# Patient Record
Sex: FEMALE | Race: WHITE | NOT HISPANIC OR LATINO | Employment: OTHER | RURAL
[De-identification: names, ages, dates, MRNs, and addresses within clinical notes are randomized per-mention and may not be internally consistent; named-entity substitution may affect disease eponyms.]

---

## 2020-11-02 ENCOUNTER — HISTORICAL (OUTPATIENT)
Dept: ADMINISTRATIVE | Facility: HOSPITAL | Age: 75
End: 2020-11-02

## 2020-11-02 LAB — CALCIUM SERPL-MCNC: 9.9 MG/DL (ref 8.5–10.1)

## 2021-05-27 ENCOUNTER — TELEPHONE (OUTPATIENT)
Dept: FAMILY MEDICINE | Facility: CLINIC | Age: 76
End: 2021-05-27

## 2021-05-27 DIAGNOSIS — M81.0 OSTEOPOROSIS, UNSPECIFIED OSTEOPOROSIS TYPE, UNSPECIFIED PATHOLOGICAL FRACTURE PRESENCE: Primary | ICD-10-CM

## 2021-06-17 ENCOUNTER — OFFICE VISIT (OUTPATIENT)
Dept: FAMILY MEDICINE | Facility: CLINIC | Age: 76
End: 2021-06-17
Payer: COMMERCIAL

## 2021-06-17 VITALS
BODY MASS INDEX: 31.29 KG/M2 | TEMPERATURE: 97 F | RESPIRATION RATE: 20 BRPM | HEIGHT: 67 IN | SYSTOLIC BLOOD PRESSURE: 142 MMHG | HEART RATE: 54 BPM | WEIGHT: 199.38 LBS | DIASTOLIC BLOOD PRESSURE: 92 MMHG | OXYGEN SATURATION: 94 %

## 2021-06-17 DIAGNOSIS — M70.61 TROCHANTERIC BURSITIS OF RIGHT HIP: Primary | ICD-10-CM

## 2021-06-17 DIAGNOSIS — F32.A DEPRESSIVE DISORDER: ICD-10-CM

## 2021-06-17 PROCEDURE — 20610 DRAIN/INJ JOINT/BURSA W/O US: CPT | Mod: RT,,, | Performed by: FAMILY MEDICINE

## 2021-06-17 PROCEDURE — 99214 PR OFFICE/OUTPT VISIT, EST, LEVL IV, 30-39 MIN: ICD-10-PCS | Mod: 25,,, | Performed by: FAMILY MEDICINE

## 2021-06-17 PROCEDURE — 20610 LARGE JOINT ASPIRATION/INJECTION: R GREATER TROCHANTERIC BURSA: ICD-10-PCS | Mod: RT,,, | Performed by: FAMILY MEDICINE

## 2021-06-17 PROCEDURE — 99214 OFFICE O/P EST MOD 30 MIN: CPT | Mod: 25,,, | Performed by: FAMILY MEDICINE

## 2021-06-17 RX ORDER — CALCIUM CARBONATE/VITAMIN D3 600MG-5MCG
1 TABLET ORAL 2 TIMES DAILY
COMMUNITY

## 2021-06-17 RX ORDER — ZINC GLUCONATE 50 MG
50 TABLET ORAL DAILY
COMMUNITY

## 2021-06-17 RX ORDER — AMLODIPINE BESYLATE 5 MG/1
5 TABLET ORAL 2 TIMES DAILY
COMMUNITY
Start: 2021-04-14 | End: 2021-10-07 | Stop reason: SDUPTHER

## 2021-06-17 RX ORDER — LIDOCAINE HYDROCHLORIDE 20 MG/ML
2 INJECTION, SOLUTION INFILTRATION; PERINEURAL
Status: DISCONTINUED | OUTPATIENT
Start: 2021-06-17 | End: 2021-06-17 | Stop reason: HOSPADM

## 2021-06-17 RX ORDER — CELECOXIB 200 MG/1
200 CAPSULE ORAL DAILY
COMMUNITY
Start: 2021-04-14 | End: 2021-10-27 | Stop reason: SDUPTHER

## 2021-06-17 RX ORDER — DENOSUMAB 60 MG/ML
60 INJECTION SUBCUTANEOUS
Status: ON HOLD | COMMUNITY
End: 2023-09-27 | Stop reason: HOSPADM

## 2021-06-17 RX ORDER — RIVAROXABAN 20 MG/1
1 TABLET, FILM COATED ORAL DAILY
COMMUNITY
Start: 2021-04-14 | End: 2021-10-14 | Stop reason: SDUPTHER

## 2021-06-17 RX ORDER — LIDOCAINE 50 MG/G
1 PATCH TOPICAL DAILY
Qty: 30 PATCH | Refills: 5 | Status: SHIPPED | OUTPATIENT
Start: 2021-06-17 | End: 2021-10-24

## 2021-06-17 RX ORDER — ASCORBIC ACID 500 MG
500 TABLET ORAL DAILY
COMMUNITY

## 2021-06-17 RX ORDER — ATORVASTATIN CALCIUM 40 MG/1
40 TABLET, FILM COATED ORAL DAILY
COMMUNITY
Start: 2021-04-14 | End: 2021-07-09 | Stop reason: SDUPTHER

## 2021-06-17 RX ORDER — GABAPENTIN 300 MG/1
CAPSULE ORAL
COMMUNITY
Start: 2021-04-14 | End: 2023-03-13 | Stop reason: SDUPTHER

## 2021-06-17 RX ORDER — DESVENLAFAXINE 100 MG/1
100 TABLET, EXTENDED RELEASE ORAL DAILY
COMMUNITY
Start: 2021-04-14 | End: 2021-06-17 | Stop reason: ALTCHOICE

## 2021-06-17 RX ORDER — ALBUTEROL SULFATE 90 UG/1
AEROSOL, METERED RESPIRATORY (INHALATION)
COMMUNITY
Start: 2021-05-26 | End: 2021-07-09 | Stop reason: SDUPTHER

## 2021-06-17 RX ORDER — PRAMIPEXOLE DIHYDROCHLORIDE 0.12 MG/1
0.12 TABLET ORAL NIGHTLY
COMMUNITY
Start: 2021-04-14 | End: 2021-10-14 | Stop reason: SDUPTHER

## 2021-06-17 RX ORDER — PRIMIDONE 50 MG/1
TABLET ORAL
COMMUNITY
Start: 2021-04-14 | End: 2021-10-14 | Stop reason: SDUPTHER

## 2021-06-17 RX ORDER — NEOMYCIN SULFATE, POLYMYXIN B SULFATE, HYDROCORTISONE 3.5; 10000; 1 MG/ML; [USP'U]/ML; MG/ML
2 SOLUTION/ DROPS AURICULAR (OTIC) EVERY 6 HOURS
COMMUNITY
Start: 2021-01-14 | End: 2022-12-12 | Stop reason: ALTCHOICE

## 2021-06-17 RX ORDER — FUROSEMIDE 20 MG/1
20 TABLET ORAL DAILY
COMMUNITY
Start: 2021-04-14 | End: 2021-09-21 | Stop reason: SDUPTHER

## 2021-06-17 RX ORDER — IPRATROPIUM BROMIDE 21 UG/1
SPRAY, METERED NASAL
COMMUNITY
Start: 2021-04-14 | End: 2021-07-16 | Stop reason: DRUGHIGH

## 2021-06-17 RX ORDER — DICLOFENAC SODIUM 10 MG/G
GEL TOPICAL
COMMUNITY
Start: 2021-04-14 | End: 2021-07-09 | Stop reason: SDUPTHER

## 2021-06-17 RX ORDER — OMEPRAZOLE 20 MG/1
20 CAPSULE, DELAYED RELEASE ORAL DAILY
COMMUNITY
Start: 2021-04-14 | End: 2021-10-14 | Stop reason: SDUPTHER

## 2021-06-17 RX ORDER — SERTRALINE HYDROCHLORIDE 50 MG/1
50 TABLET, FILM COATED ORAL DAILY
Qty: 30 TABLET | Refills: 11 | Status: SHIPPED | OUTPATIENT
Start: 2021-06-17 | End: 2021-10-24

## 2021-06-17 RX ORDER — DEXAMETHASONE SODIUM PHOSPHATE 4 MG/ML
4 INJECTION, SOLUTION INTRA-ARTICULAR; INTRALESIONAL; INTRAMUSCULAR; INTRAVENOUS; SOFT TISSUE
Status: DISCONTINUED | OUTPATIENT
Start: 2021-06-17 | End: 2021-06-17 | Stop reason: HOSPADM

## 2021-06-17 RX ORDER — TIZANIDINE 4 MG/1
4 TABLET ORAL 2 TIMES DAILY PRN
COMMUNITY
Start: 2021-04-14 | End: 2021-10-07 | Stop reason: SDUPTHER

## 2021-06-17 RX ORDER — LOSARTAN POTASSIUM 100 MG/1
100 TABLET ORAL DAILY
COMMUNITY
Start: 2021-04-14 | End: 2021-10-14 | Stop reason: SDUPTHER

## 2021-06-17 RX ORDER — HYDROCODONE BITARTRATE AND ACETAMINOPHEN 10; 325 MG/1; MG/1
TABLET ORAL
COMMUNITY
Start: 2021-05-27

## 2021-06-17 RX ORDER — PROPRANOLOL HYDROCHLORIDE 40 MG/1
40 TABLET ORAL 2 TIMES DAILY
COMMUNITY
Start: 2021-04-14 | End: 2021-10-14 | Stop reason: SDUPTHER

## 2021-06-17 RX ADMIN — LIDOCAINE HYDROCHLORIDE 2 ML: 20 INJECTION, SOLUTION INFILTRATION; PERINEURAL at 09:06

## 2021-06-17 RX ADMIN — DEXAMETHASONE SODIUM PHOSPHATE 4 MG: 4 INJECTION, SOLUTION INTRA-ARTICULAR; INTRALESIONAL; INTRAMUSCULAR; INTRAVENOUS; SOFT TISSUE at 09:06

## 2021-07-01 ENCOUNTER — HOSPITAL ENCOUNTER (OUTPATIENT)
Dept: RADIOLOGY | Facility: HOSPITAL | Age: 76
Discharge: HOME | End: 2021-07-01
Attending: FAMILY MEDICINE
Payer: COMMERCIAL

## 2021-07-01 DIAGNOSIS — M81.0 OSTEOPOROSIS, POST-MENOPAUSAL: ICD-10-CM

## 2021-07-01 PROCEDURE — 77080 DXA BONE DENSITY AXIAL: CPT | Mod: TC

## 2021-07-09 RX ORDER — ATORVASTATIN CALCIUM 40 MG/1
40 TABLET, FILM COATED ORAL DAILY
Qty: 90 TABLET | Refills: 1 | Status: SHIPPED | OUTPATIENT
Start: 2021-07-09 | End: 2022-01-12 | Stop reason: SDUPTHER

## 2021-07-09 RX ORDER — DICLOFENAC SODIUM 10 MG/G
GEL TOPICAL
Qty: 500 G | Refills: 5 | Status: SHIPPED | OUTPATIENT
Start: 2021-07-09 | End: 2022-01-12 | Stop reason: SDUPTHER

## 2021-07-09 RX ORDER — DESVENLAFAXINE 100 MG/1
100 TABLET, EXTENDED RELEASE ORAL DAILY
COMMUNITY
Start: 2021-07-08 | End: 2021-10-14 | Stop reason: SDUPTHER

## 2021-07-09 RX ORDER — ALBUTEROL SULFATE 90 UG/1
AEROSOL, METERED RESPIRATORY (INHALATION)
Qty: 18 G | Refills: 1 | Status: SHIPPED | OUTPATIENT
Start: 2021-07-09 | End: 2021-10-07 | Stop reason: SDUPTHER

## 2021-07-12 DIAGNOSIS — J30.9 CHRONIC ALLERGIC RHINITIS: Primary | ICD-10-CM

## 2021-07-12 RX ORDER — IPRATROPIUM BROMIDE 42 UG/1
2 SPRAY, METERED NASAL 3 TIMES DAILY
COMMUNITY
End: 2021-07-12 | Stop reason: SDUPTHER

## 2021-07-12 RX ORDER — IPRATROPIUM BROMIDE 42 UG/1
2 SPRAY, METERED NASAL 3 TIMES DAILY
Qty: 45 ML | Refills: 3 | Status: SHIPPED | OUTPATIENT
Start: 2021-07-12 | End: 2021-10-14 | Stop reason: SDUPTHER

## 2021-07-14 ENCOUNTER — OFFICE VISIT (OUTPATIENT)
Dept: FAMILY MEDICINE | Facility: CLINIC | Age: 76
End: 2021-07-14
Payer: COMMERCIAL

## 2021-07-14 VITALS
SYSTOLIC BLOOD PRESSURE: 142 MMHG | TEMPERATURE: 97 F | DIASTOLIC BLOOD PRESSURE: 62 MMHG | HEIGHT: 67 IN | WEIGHT: 209 LBS | RESPIRATION RATE: 20 BRPM | BODY MASS INDEX: 32.8 KG/M2 | HEART RATE: 62 BPM | OXYGEN SATURATION: 99 %

## 2021-07-14 DIAGNOSIS — M19.90 OSTEOARTHRITIS, UNSPECIFIED OSTEOARTHRITIS TYPE, UNSPECIFIED SITE: ICD-10-CM

## 2021-07-14 DIAGNOSIS — M54.9 BACK PAIN, UNSPECIFIED BACK LOCATION, UNSPECIFIED BACK PAIN LATERALITY, UNSPECIFIED CHRONICITY: Primary | ICD-10-CM

## 2021-07-14 DIAGNOSIS — M81.0 OSTEOPOROSIS, UNSPECIFIED OSTEOPOROSIS TYPE, UNSPECIFIED PATHOLOGICAL FRACTURE PRESENCE: ICD-10-CM

## 2021-07-14 DIAGNOSIS — F32.A DEPRESSION, UNSPECIFIED DEPRESSION TYPE: ICD-10-CM

## 2021-07-14 DIAGNOSIS — R60.9 EDEMA, UNSPECIFIED TYPE: ICD-10-CM

## 2021-07-14 LAB
ANION GAP SERPL CALCULATED.3IONS-SCNC: 10 MMOL/L (ref 7–16)
BUN SERPL-MCNC: 11 MG/DL (ref 7–18)
BUN/CREAT SERPL: 12 (ref 6–20)
CALCIUM SERPL-MCNC: 8.8 MG/DL (ref 8.5–10.1)
CHLORIDE SERPL-SCNC: 100 MMOL/L (ref 98–107)
CO2 SERPL-SCNC: 31 MMOL/L (ref 21–32)
CREAT SERPL-MCNC: 0.95 MG/DL (ref 0.55–1.02)
GLUCOSE SERPL-MCNC: 108 MG/DL (ref 74–106)
POTASSIUM SERPL-SCNC: 4.2 MMOL/L (ref 3.5–5.1)
SODIUM SERPL-SCNC: 137 MMOL/L (ref 136–145)

## 2021-07-14 PROCEDURE — 99214 PR OFFICE/OUTPT VISIT, EST, LEVL IV, 30-39 MIN: ICD-10-PCS | Mod: 25,,, | Performed by: FAMILY MEDICINE

## 2021-07-14 PROCEDURE — 96372 THER/PROPH/DIAG INJ SC/IM: CPT | Mod: ,,, | Performed by: FAMILY MEDICINE

## 2021-07-14 PROCEDURE — 99214 OFFICE O/P EST MOD 30 MIN: CPT | Mod: 25,,, | Performed by: FAMILY MEDICINE

## 2021-07-14 PROCEDURE — 80048 BASIC METABOLIC PNL TOTAL CA: CPT | Mod: ,,, | Performed by: CLINICAL MEDICAL LABORATORY

## 2021-07-14 PROCEDURE — 80048 BASIC METABOLIC PANEL: ICD-10-PCS | Mod: ,,, | Performed by: CLINICAL MEDICAL LABORATORY

## 2021-07-14 PROCEDURE — 96372 PR INJECTION,THERAP/PROPH/DIAG2ST, IM OR SUBCUT: ICD-10-PCS | Mod: ,,, | Performed by: FAMILY MEDICINE

## 2021-07-14 RX ORDER — METHYLPREDNISOLONE ACETATE 40 MG/ML
40 INJECTION, SUSPENSION INTRA-ARTICULAR; INTRALESIONAL; INTRAMUSCULAR; SOFT TISSUE
Status: COMPLETED | OUTPATIENT
Start: 2021-07-14 | End: 2021-07-14

## 2021-07-14 RX ADMIN — METHYLPREDNISOLONE ACETATE 40 MG: 40 INJECTION, SUSPENSION INTRA-ARTICULAR; INTRALESIONAL; INTRAMUSCULAR; SOFT TISSUE at 04:07

## 2021-09-21 DIAGNOSIS — R60.9 EDEMA, UNSPECIFIED TYPE: Primary | ICD-10-CM

## 2021-09-23 RX ORDER — FUROSEMIDE 20 MG/1
20 TABLET ORAL DAILY
Qty: 30 TABLET | Refills: 3 | Status: SHIPPED | OUTPATIENT
Start: 2021-09-23 | End: 2022-01-05 | Stop reason: SDUPTHER

## 2021-10-07 DIAGNOSIS — J44.9 CHRONIC OBSTRUCTIVE PULMONARY DISEASE, UNSPECIFIED COPD TYPE: ICD-10-CM

## 2021-10-07 DIAGNOSIS — M54.9 BACK PAIN, UNSPECIFIED BACK LOCATION, UNSPECIFIED BACK PAIN LATERALITY, UNSPECIFIED CHRONICITY: Primary | ICD-10-CM

## 2021-10-07 DIAGNOSIS — I10 HYPERTENSION, UNSPECIFIED TYPE: ICD-10-CM

## 2021-10-07 RX ORDER — TIZANIDINE 4 MG/1
4 TABLET ORAL 2 TIMES DAILY PRN
Qty: 30 TABLET | Refills: 3 | Status: SHIPPED | OUTPATIENT
Start: 2021-10-07 | End: 2022-07-20 | Stop reason: SDUPTHER

## 2021-10-07 RX ORDER — ALBUTEROL SULFATE 90 UG/1
AEROSOL, METERED RESPIRATORY (INHALATION)
Qty: 18 G | Refills: 1 | Status: SHIPPED | OUTPATIENT
Start: 2021-10-07 | End: 2021-12-01 | Stop reason: SDUPTHER

## 2021-10-07 RX ORDER — AMLODIPINE BESYLATE 5 MG/1
5 TABLET ORAL 2 TIMES DAILY
Qty: 30 TABLET | Refills: 3 | Status: SHIPPED | OUTPATIENT
Start: 2021-10-07 | End: 2021-10-27 | Stop reason: SDUPTHER

## 2021-10-14 ENCOUNTER — OFFICE VISIT (OUTPATIENT)
Dept: FAMILY MEDICINE | Facility: CLINIC | Age: 76
End: 2021-10-14
Payer: COMMERCIAL

## 2021-10-14 VITALS
HEIGHT: 67 IN | SYSTOLIC BLOOD PRESSURE: 144 MMHG | WEIGHT: 197.38 LBS | BODY MASS INDEX: 30.98 KG/M2 | HEART RATE: 65 BPM | OXYGEN SATURATION: 99 % | RESPIRATION RATE: 20 BRPM | DIASTOLIC BLOOD PRESSURE: 86 MMHG | TEMPERATURE: 97 F

## 2021-10-14 DIAGNOSIS — M54.41 CHRONIC MIDLINE LOW BACK PAIN WITH RIGHT-SIDED SCIATICA: ICD-10-CM

## 2021-10-14 DIAGNOSIS — F33.0 MILD EPISODE OF RECURRENT MAJOR DEPRESSIVE DISORDER: ICD-10-CM

## 2021-10-14 DIAGNOSIS — K21.9 GERD WITHOUT ESOPHAGITIS: ICD-10-CM

## 2021-10-14 DIAGNOSIS — J30.9 CHRONIC ALLERGIC RHINITIS: ICD-10-CM

## 2021-10-14 DIAGNOSIS — I10 ESSENTIAL HYPERTENSION: ICD-10-CM

## 2021-10-14 DIAGNOSIS — I82.592 CHRONIC DEEP VEIN THROMBOSIS (DVT) OF OTHER VEIN OF LEFT LOWER EXTREMITY: Primary | ICD-10-CM

## 2021-10-14 DIAGNOSIS — M16.11 PRIMARY OSTEOARTHRITIS OF RIGHT HIP: ICD-10-CM

## 2021-10-14 DIAGNOSIS — G25.81 RESTLESS LEGS: ICD-10-CM

## 2021-10-14 DIAGNOSIS — G89.29 CHRONIC MIDLINE LOW BACK PAIN WITH RIGHT-SIDED SCIATICA: ICD-10-CM

## 2021-10-14 PROCEDURE — 96372 THER/PROPH/DIAG INJ SC/IM: CPT | Mod: ,,, | Performed by: FAMILY MEDICINE

## 2021-10-14 PROCEDURE — 99214 OFFICE O/P EST MOD 30 MIN: CPT | Mod: 25,,, | Performed by: FAMILY MEDICINE

## 2021-10-14 PROCEDURE — 96372 PR INJECTION,THERAP/PROPH/DIAG2ST, IM OR SUBCUT: ICD-10-PCS | Mod: ,,, | Performed by: FAMILY MEDICINE

## 2021-10-14 PROCEDURE — 99214 PR OFFICE/OUTPT VISIT, EST, LEVL IV, 30-39 MIN: ICD-10-PCS | Mod: 25,,, | Performed by: FAMILY MEDICINE

## 2021-10-14 RX ORDER — PROPRANOLOL HYDROCHLORIDE 40 MG/1
40 TABLET ORAL 2 TIMES DAILY
Qty: 180 TABLET | Refills: 1 | Status: SHIPPED | OUTPATIENT
Start: 2021-10-14 | End: 2022-06-27 | Stop reason: SDUPTHER

## 2021-10-14 RX ORDER — RIVAROXABAN 20 MG/1
20 TABLET, FILM COATED ORAL DAILY
Qty: 90 TABLET | Refills: 1 | Status: SHIPPED | OUTPATIENT
Start: 2021-10-14 | End: 2022-06-27 | Stop reason: SDUPTHER

## 2021-10-14 RX ORDER — METHYLPREDNISOLONE ACETATE 40 MG/ML
40 INJECTION, SUSPENSION INTRA-ARTICULAR; INTRALESIONAL; INTRAMUSCULAR; SOFT TISSUE
Status: COMPLETED | OUTPATIENT
Start: 2021-10-14 | End: 2021-10-14

## 2021-10-14 RX ORDER — LOSARTAN POTASSIUM 100 MG/1
100 TABLET ORAL DAILY
Qty: 90 TABLET | Refills: 1 | Status: SHIPPED | OUTPATIENT
Start: 2021-10-14 | End: 2022-06-27 | Stop reason: SDUPTHER

## 2021-10-14 RX ORDER — DESVENLAFAXINE 100 MG/1
100 TABLET, EXTENDED RELEASE ORAL DAILY
Qty: 90 TABLET | Refills: 1 | Status: SHIPPED | OUTPATIENT
Start: 2021-10-14 | End: 2022-07-20 | Stop reason: SDUPTHER

## 2021-10-14 RX ORDER — OMEPRAZOLE 20 MG/1
20 CAPSULE, DELAYED RELEASE ORAL DAILY
Qty: 90 CAPSULE | Refills: 1 | Status: SHIPPED | OUTPATIENT
Start: 2021-10-14 | End: 2022-07-20 | Stop reason: SDUPTHER

## 2021-10-14 RX ORDER — IPRATROPIUM BROMIDE 42 UG/1
2 SPRAY, METERED NASAL 3 TIMES DAILY
Qty: 45 ML | Refills: 3 | Status: SHIPPED | OUTPATIENT
Start: 2021-10-14 | End: 2022-07-20 | Stop reason: SDUPTHER

## 2021-10-14 RX ORDER — PRAMIPEXOLE DIHYDROCHLORIDE 0.12 MG/1
0.25 TABLET ORAL NIGHTLY
Qty: 60 TABLET | Refills: 5 | Status: SHIPPED | OUTPATIENT
Start: 2021-10-14 | End: 2022-05-25 | Stop reason: SDUPTHER

## 2021-10-14 RX ORDER — PRIMIDONE 50 MG/1
TABLET ORAL
Qty: 450 TABLET | Refills: 1 | Status: SHIPPED | OUTPATIENT
Start: 2021-10-14 | End: 2022-07-20 | Stop reason: SDUPTHER

## 2021-10-14 RX ADMIN — METHYLPREDNISOLONE ACETATE 40 MG: 40 INJECTION, SUSPENSION INTRA-ARTICULAR; INTRALESIONAL; INTRAMUSCULAR; SOFT TISSUE at 10:10

## 2021-10-21 ENCOUNTER — CLINICAL SUPPORT (OUTPATIENT)
Dept: FAMILY MEDICINE | Facility: CLINIC | Age: 76
End: 2021-10-21
Payer: COMMERCIAL

## 2021-10-21 PROCEDURE — 90662 IIV NO PRSV INCREASED AG IM: CPT | Mod: ,,, | Performed by: FAMILY MEDICINE

## 2021-10-21 PROCEDURE — G0008 ADMIN INFLUENZA VIRUS VAC: HCPCS | Mod: ,,, | Performed by: FAMILY MEDICINE

## 2021-10-21 PROCEDURE — 90662 FLU VACCINE - QUADRIVALENT - HIGH DOSE (65+) PRESERVATIVE FREE IM: ICD-10-PCS | Mod: ,,, | Performed by: FAMILY MEDICINE

## 2021-10-21 PROCEDURE — G0008 FLU VACCINE - QUADRIVALENT - HIGH DOSE (65+) PRESERVATIVE FREE IM: ICD-10-PCS | Mod: ,,, | Performed by: FAMILY MEDICINE

## 2021-10-27 DIAGNOSIS — I10 HYPERTENSION, UNSPECIFIED TYPE: ICD-10-CM

## 2021-10-28 RX ORDER — AMLODIPINE BESYLATE 5 MG/1
5 TABLET ORAL 2 TIMES DAILY
Qty: 180 TABLET | Refills: 0 | Status: SHIPPED | OUTPATIENT
Start: 2021-10-28 | End: 2022-01-05 | Stop reason: SDUPTHER

## 2021-10-28 RX ORDER — CELECOXIB 200 MG/1
200 CAPSULE ORAL DAILY
Qty: 90 CAPSULE | Refills: 0 | Status: SHIPPED | OUTPATIENT
Start: 2021-10-28 | End: 2022-01-12 | Stop reason: SDUPTHER

## 2021-11-16 ENCOUNTER — INFUSION (OUTPATIENT)
Dept: INFUSION THERAPY | Facility: HOSPITAL | Age: 76
End: 2021-11-16
Attending: FAMILY MEDICINE
Payer: MEDICARE

## 2021-11-16 VITALS
DIASTOLIC BLOOD PRESSURE: 74 MMHG | BODY MASS INDEX: 31.08 KG/M2 | SYSTOLIC BLOOD PRESSURE: 157 MMHG | WEIGHT: 198 LBS | HEART RATE: 55 BPM | TEMPERATURE: 99 F | RESPIRATION RATE: 18 BRPM | HEIGHT: 67 IN | OXYGEN SATURATION: 98 %

## 2021-11-16 DIAGNOSIS — M81.0 OSTEOPOROSIS, UNSPECIFIED OSTEOPOROSIS TYPE, UNSPECIFIED PATHOLOGICAL FRACTURE PRESENCE: Primary | ICD-10-CM

## 2021-11-16 DIAGNOSIS — M81.0 AGE-RELATED OSTEOPOROSIS WITHOUT CURRENT PATHOLOGICAL FRACTURE: ICD-10-CM

## 2021-11-16 LAB — CALCIUM SERPL-MCNC: 8.7 MG/DL (ref 8.5–10.1)

## 2021-11-16 PROCEDURE — 96372 THER/PROPH/DIAG INJ SC/IM: CPT

## 2021-11-16 PROCEDURE — 63600175 PHARM REV CODE 636 W HCPCS: Performed by: FAMILY MEDICINE

## 2021-11-16 PROCEDURE — 82310 ASSAY OF CALCIUM: CPT | Performed by: FAMILY MEDICINE

## 2021-11-16 PROCEDURE — 36415 COLL VENOUS BLD VENIPUNCTURE: CPT | Performed by: FAMILY MEDICINE

## 2021-11-16 RX ADMIN — DENOSUMAB 60 MG: 60 INJECTION SUBCUTANEOUS at 10:11

## 2021-12-01 DIAGNOSIS — J44.9 CHRONIC OBSTRUCTIVE PULMONARY DISEASE, UNSPECIFIED COPD TYPE: ICD-10-CM

## 2021-12-01 RX ORDER — ALBUTEROL SULFATE 90 UG/1
AEROSOL, METERED RESPIRATORY (INHALATION)
Qty: 18 G | Refills: 2 | Status: SHIPPED | OUTPATIENT
Start: 2021-12-01 | End: 2022-03-31 | Stop reason: SDUPTHER

## 2022-01-05 DIAGNOSIS — I10 HYPERTENSION, UNSPECIFIED TYPE: ICD-10-CM

## 2022-01-05 DIAGNOSIS — R60.9 EDEMA, UNSPECIFIED TYPE: ICD-10-CM

## 2022-01-06 RX ORDER — AMLODIPINE BESYLATE 5 MG/1
5 TABLET ORAL 2 TIMES DAILY
Qty: 180 TABLET | Refills: 1 | Status: SHIPPED | OUTPATIENT
Start: 2022-01-06 | End: 2022-07-20 | Stop reason: SDUPTHER

## 2022-01-06 RX ORDER — FUROSEMIDE 20 MG/1
20 TABLET ORAL DAILY
Qty: 90 TABLET | Refills: 1 | Status: SHIPPED | OUTPATIENT
Start: 2022-01-06 | End: 2022-07-20 | Stop reason: SDUPTHER

## 2022-01-12 DIAGNOSIS — M16.11 PRIMARY OSTEOARTHRITIS OF RIGHT HIP: Primary | ICD-10-CM

## 2022-01-12 DIAGNOSIS — E78.2 MIXED HYPERLIPIDEMIA: ICD-10-CM

## 2022-01-12 RX ORDER — CELECOXIB 200 MG/1
200 CAPSULE ORAL DAILY
Qty: 90 CAPSULE | Refills: 1 | Status: SHIPPED | OUTPATIENT
Start: 2022-01-12 | End: 2022-07-20 | Stop reason: SDUPTHER

## 2022-01-12 RX ORDER — DICLOFENAC SODIUM 10 MG/G
GEL TOPICAL
Qty: 500 G | Refills: 5 | Status: SHIPPED | OUTPATIENT
Start: 2022-01-12 | End: 2022-06-27 | Stop reason: SDUPTHER

## 2022-01-12 RX ORDER — ATORVASTATIN CALCIUM 40 MG/1
40 TABLET, FILM COATED ORAL DAILY
Qty: 90 TABLET | Refills: 1 | Status: SHIPPED | OUTPATIENT
Start: 2022-01-12 | End: 2022-06-27 | Stop reason: SDUPTHER

## 2022-01-19 ENCOUNTER — OFFICE VISIT (OUTPATIENT)
Dept: FAMILY MEDICINE | Facility: CLINIC | Age: 77
End: 2022-01-19
Payer: COMMERCIAL

## 2022-01-19 VITALS
RESPIRATION RATE: 22 BRPM | DIASTOLIC BLOOD PRESSURE: 78 MMHG | HEART RATE: 74 BPM | HEIGHT: 67 IN | OXYGEN SATURATION: 97 % | SYSTOLIC BLOOD PRESSURE: 126 MMHG | TEMPERATURE: 97 F | BODY MASS INDEX: 31.08 KG/M2 | WEIGHT: 198 LBS

## 2022-01-19 DIAGNOSIS — M16.11 PRIMARY OSTEOARTHRITIS OF RIGHT HIP: ICD-10-CM

## 2022-01-19 DIAGNOSIS — H65.23 BILATERAL CHRONIC SEROUS OTITIS MEDIA: Primary | ICD-10-CM

## 2022-01-19 PROCEDURE — 3078F PR MOST RECENT DIASTOLIC BLOOD PRESSURE < 80 MM HG: ICD-10-PCS | Mod: ,,, | Performed by: FAMILY MEDICINE

## 2022-01-19 PROCEDURE — 99214 PR OFFICE/OUTPT VISIT, EST, LEVL IV, 30-39 MIN: ICD-10-PCS | Mod: 25,,, | Performed by: FAMILY MEDICINE

## 2022-01-19 PROCEDURE — 96372 PR INJECTION,THERAP/PROPH/DIAG2ST, IM OR SUBCUT: ICD-10-PCS | Mod: ,,, | Performed by: FAMILY MEDICINE

## 2022-01-19 PROCEDURE — 96372 THER/PROPH/DIAG INJ SC/IM: CPT | Mod: ,,, | Performed by: FAMILY MEDICINE

## 2022-01-19 PROCEDURE — 3074F SYST BP LT 130 MM HG: CPT | Mod: ,,, | Performed by: FAMILY MEDICINE

## 2022-01-19 PROCEDURE — 3074F PR MOST RECENT SYSTOLIC BLOOD PRESSURE < 130 MM HG: ICD-10-PCS | Mod: ,,, | Performed by: FAMILY MEDICINE

## 2022-01-19 PROCEDURE — 3078F DIAST BP <80 MM HG: CPT | Mod: ,,, | Performed by: FAMILY MEDICINE

## 2022-01-19 PROCEDURE — 99214 OFFICE O/P EST MOD 30 MIN: CPT | Mod: 25,,, | Performed by: FAMILY MEDICINE

## 2022-01-19 RX ORDER — IPRATROPIUM BROMIDE 42 UG/1
2 SPRAY, METERED NASAL 4 TIMES DAILY
Qty: 15 ML | Refills: 3 | Status: SHIPPED | OUTPATIENT
Start: 2022-01-19 | End: 2022-07-20 | Stop reason: SDUPTHER

## 2022-01-19 RX ORDER — METHYLPREDNISOLONE ACETATE 40 MG/ML
40 INJECTION, SUSPENSION INTRA-ARTICULAR; INTRALESIONAL; INTRAMUSCULAR; SOFT TISSUE
Status: COMPLETED | OUTPATIENT
Start: 2022-01-19 | End: 2022-01-19

## 2022-01-19 RX ADMIN — METHYLPREDNISOLONE ACETATE 40 MG: 40 INJECTION, SUSPENSION INTRA-ARTICULAR; INTRALESIONAL; INTRAMUSCULAR; SOFT TISSUE at 09:01

## 2022-01-19 NOTE — ASSESSMENT & PLAN NOTE
Patient has low back pain and one history of the same and she is treated with the pain medications.  She denies any fever or chills.  We will give her a Depo-Medrol 40 IM today maintain her on her current other medicines.  Follow-up p.r.n.

## 2022-01-19 NOTE — PROGRESS NOTES
Dominic Montesinos DO   [address]  [place]  [address]      PATIENT NAME: Felicia Keita  : 1945  DATE: 22  MRN: [id number]      Billing Provider: Dominic Montesinos DO  Level of Service:   Patient PCP Information     Provider PCP Type    Dominic Montesinos DO General          Reason for Visit / Chief Complaint: Hypertension (Medication refills.), Hip Pain (Right hip and leg pain.), Hyperlipidemia, and Otalgia (Right earache x weeks)       Update PCP  Update Chief Complaint         History of Present Illness / Problem Focused Workflow     Felicia Keita presents to the clinic with Hypertension (Medication refills.), Hip Pain (Right hip and leg pain.), Hyperlipidemia, and Otalgia (Right earache x weeks)     Patient has a history of chronic low back pain in his progressively getting worse she denies any fever chills or sweats.  She also reports of fullness in her ears with pain in her right ear primarily.  She has use ear drops in the ear without any success.  She does have some postnasal drip and some sinus congestion.  Also noted is that the patient takes chronic pain medication.      Review of Systems     Review of Systems   Constitutional: Negative for activity change, appetite change, chills, fatigue and fever.   HENT: Positive for ear pain and sinus pressure/congestion. Negative for nasal congestion, ear discharge, mouth dryness, mouth sores, postnasal drip, sore throat and voice change.    Eyes: Negative for pain, discharge, redness, itching and visual disturbance.   Respiratory: Negative for apnea, cough, chest tightness, shortness of breath and wheezing.    Cardiovascular: Negative for chest pain, palpitations and leg swelling.   Gastrointestinal: Negative for abdominal distention, abdominal pain, anal bleeding, blood in stool, change in bowel habit, constipation, diarrhea, nausea, vomiting, reflux and change in bowel habit.   Endocrine: Negative for cold intolerance, heat  intolerance, polydipsia, polyphagia and polyuria.   Genitourinary: Negative for difficulty urinating, enuresis, erectile dysfunction, frequency, genital sores, hematuria, hot flashes, menstrual irregularity, urgency and vaginal dryness.   Musculoskeletal: Positive for back pain and leg pain. Negative for arthralgias, gait problem, myalgias and neck pain.   Integumentary:  Negative for rash, mole/lesion, breast mass, breast discharge and breast tenderness.   Allergic/Immunologic: Negative for environmental allergies and food allergies.   Neurological: Negative for dizziness, vertigo, tremors, seizures, syncope, facial asymmetry, speech difficulty, weakness, light-headedness, numbness, headaches, disturbances in coordination, memory loss and coordination difficulties.   Hematological: Negative for adenopathy. Does not bruise/bleed easily.   Psychiatric/Behavioral: Negative for agitation, behavioral problems, confusion, decreased concentration, dysphoric mood, hallucinations, self-injury, sleep disturbance and suicidal ideas. The patient is not nervous/anxious and is not hyperactive.    Breast: Negative for mass and tenderness      Medical / Social / Family History     Past Medical History:   Diagnosis Date    Abnormal gait 05/22/2013    Actinic keratosis 08/12/2020    L forearm     Allergic rhinitis 04/07/2020    Blepharophimosis 04/22/2014    senile    Cervical somatic dysfunction 08/15/2017    Cervicalgia 03/28/2019    Chronic peripheral venous hypertension 06/16/2015    Chronic serous otitis media, bilateral 09/05/2019    Chronic venous hypertension (idiopathic) without complications of unspecified lower extremity 08/15/2017    Conjunctival hemorrhage, right eye 11/02/2020    Deep venous thrombosis of lower extremity 09/17/2012    Degeneration of cervical intervertebral disc 10/21/2011    Degeneration of lumbar intervertebral disc 10/21/2011    Degenerative joint disease involving multiple joints  07/25/2011    Depressive disorder 07/25/2011    Essential hypertension 08/12/2020    Essential tremor 06/24/2014    GERD without esophagitis 08/15/2017    Headache 04/07/2020    Hyperlipidemia 05/08/2012    Insomnia 04/06/2016    Osteoarthritis 01/04/2017    Osteoporosis 11/07/2017    Otalgia, right ear 10/17/2016    Other cervical disc degeneration, unspecified cervical region 08/15/2017    Overflow incontinence 01/06/2020    Pain in right knee 01/06/2020    osteoarthritis    Pain in right leg 09/10/2020    Peripheral arterial occlusive disease 02/24/2015    Peripheral vascular disease, unspecified 08/06/2019    Peripheral venous insufficiency 09/29/2015    Personal history of PE (pulmonary embolism) 08/06/2019    Presence of vena cava filter 03/2015    Pulmonary embolus 03/10/2015    Pulmonary infarction 03/16/2012    Pure hypercholesterolemia 07/25/2011    Restless legs 05/22/2018    Right temporomandibular joint disorder, unspecified 08/18/2020    Rosacea 07/01/2015    Sciatica, right side 07/06/2016    Seborrheic keratosis 01/07/2021    Segmental and somatic dysfunction of thoracic region 03/28/2019    Senile osteoporosis 04/24/2017    Spasm of back muscles 07/25/2011    Trochanteric bursitis of right hip 01/06/2020    Unspecified urinary incontinence 02/07/2020    Vitamin D deficiency 10/27/2014       Past Surgical History:   Procedure Laterality Date    APPENDECTOMY      bone density  06/27/2019    Ajith/Abiel    CARPAL TUNNEL RELEASE      BLI    LUMBAR LAMINECTOMY      prolia  03/12/2019    1mg    remove cataract Right     insert lens    TONSILLECTOMY      TOTAL ABDOMINAL HYSTERECTOMY      VAGINAL DELIVERY      vascular surgery procedure       Right SSV Laser Ablation performed by Dr. Carlo hernandez screen  05/24/2018       Social History  Ms.  reports that she has never smoked. She has never used smokeless tobacco. She reports that she does not drink alcohol  and does not use drugs.    Family History  Ms.'s family history includes COPD in her daughter and sister; Cancer in her brother; Emphysema in her father; Hearing loss in her father; Hypertension in her father and sister; Melanoma in her brother; Rheum arthritis in her daughter and sister; Skin cancer in her father; Stomach cancer in her sister.    Medications and Allergies     Medications  Outpatient Medications Marked as Taking for the 1/19/22 encounter (Office Visit) with Dominic Montesinos, DO   Medication Sig Dispense Refill    albuterol (PROVENTIL/VENTOLIN HFA) 90 mcg/actuation inhaler INHALE 1-2 PUFFS BY MOUTH EVERY 4 HOURS AS NEEDED 18 g 2    amLODIPine (NORVASC) 5 MG tablet Take 1 tablet (5 mg total) by mouth 2 (two) times daily. 180 tablet 1    ascorbic acid, vitamin C, (VITAMIN C) 500 MG tablet Take 500 mg by mouth once daily.      atorvastatin (LIPITOR) 40 MG tablet Take 1 tablet (40 mg total) by mouth once daily. 90 tablet 1    calcium-vitamin D tablet 600 mg-200 units Take 1 tablet by mouth 2 (two) times a day.      celecoxib (CELEBREX) 200 MG capsule Take 1 capsule (200 mg total) by mouth once daily. 90 capsule 1    denosumab (PROLIA) 60 mg/mL Syrg Inject 60 mg into the skin every 6 (six) months.      desvenlafaxine succinate (PRISTIQ) 100 MG Tb24 Take 1 tablet (100 mg total) by mouth once daily. 90 tablet 1    diclofenac sodium (VOLTAREN) 1 % Gel Apply 4 grams to large joints 4 times daily 500 g 5    furosemide (LASIX) 20 MG tablet Take 1 tablet (20 mg total) by mouth once daily. 90 tablet 1    gabapentin (NEURONTIN) 300 MG capsule TAKE 1 CAPSULE BY MOUTH IN THE MORNING, TAKE 1 CAPSULE at LUNCH, AND TAKE 2 CAPSULES AT BEDTIME      HYDROcodone-acetaminophen (NORCO)  mg per tablet 05/27/21 TAKE 1 TABLET BY MOUTH EVERY 8 HOURS AS NEEDED      ipratropium (ATROVENT) 42 mcg (0.06 %) nasal spray 2 sprays by Nasal route 3 (three) times daily. 45 mL 3    losartan (COZAAR) 100 MG tablet Take 1  tablet (100 mg total) by mouth once daily. 90 tablet 1    neomycin-polymyxin-hydrocortisone (CORTISPORIN) otic solution Place 2 drops into both ears every 6 (six) hours.      omeprazole (PRILOSEC) 20 MG capsule Take 1 capsule (20 mg total) by mouth once daily. 90 capsule 1    pramipexole (MIRAPEX) 0.125 MG tablet Take 2 tablets (0.25 mg total) by mouth nightly. 60 tablet 5    primidone (MYSOLINE) 50 MG Tab TAKE 2 TABLETS BY MOUTH EVERY MORNING AND TAKE 3 TABLETS BY MOUTH EVERY EVENING 450 tablet 1    propranoloL (INDERAL) 40 MG tablet Take 1 tablet (40 mg total) by mouth 2 (two) times daily. 180 tablet 1    tiZANidine (ZANAFLEX) 4 MG tablet Take 1 tablet (4 mg total) by mouth 2 (two) times daily as needed. 30 tablet 3    XARELTO 20 mg Tab Take 1 tablet (20 mg total) by mouth once daily. 90 tablet 1    zinc gluconate 50 mg tablet Take 50 mg by mouth once daily.       Current Facility-Administered Medications for the 1/19/22 encounter (Office Visit) with Dominic Montesinos DO   Medication Dose Route Frequency Provider Last Rate Last Admin    methylPREDNISolone acetate injection 40 mg  40 mg Intramuscular 1 time in Clinic/HOD Dominic Montesinos DO           Allergies  Review of patient's allergies indicates:  No Known Allergies    Physical Examination     Vitals:    01/19/22 0918   BP: 126/78   Pulse: 74   Resp: (!) 22   Temp: 96.8 °F (36 °C)     Physical Exam  Vitals reviewed.   Constitutional:       Appearance: Normal appearance. She is normal weight.   HENT:      Head: Normocephalic and atraumatic.      Ears:      Comments: TMs dull bilaterally with the left worse than the right with scar tissue noted on the left TM.  Throat without any lesions.  No adenopathy noted.     Nose: Nose normal.      Mouth/Throat:      Mouth: Mucous membranes are moist.      Pharynx: Oropharynx is clear. No oropharyngeal exudate or posterior oropharyngeal erythema.   Eyes:      General: No scleral icterus.     Extraocular  Movements: Extraocular movements intact.      Conjunctiva/sclera: Conjunctivae normal.      Pupils: Pupils are equal, round, and reactive to light.   Neck:      Vascular: No carotid bruit.   Cardiovascular:      Rate and Rhythm: Normal rate and regular rhythm.      Pulses: Normal pulses.      Heart sounds: Normal heart sounds. No gallop.    Pulmonary:      Effort: Pulmonary effort is normal.      Breath sounds: Normal breath sounds.   Abdominal:      General: Abdomen is flat. Bowel sounds are normal.      Palpations: Abdomen is soft.   Musculoskeletal:         General: Normal range of motion.      Cervical back: Normal range of motion and neck supple.      Right lower leg: No edema.      Left lower leg: No edema.      Comments: Lumbar range of motion is limited with tenderness SI joint on the right.  Negative straight leg raising.  Neuro is intact with some increased weakness in the right hip flexors.   Lymphadenopathy:      Cervical: No cervical adenopathy.   Skin:     General: Skin is warm and dry.      Capillary Refill: Capillary refill takes less than 2 seconds.      Coloration: Skin is not jaundiced.      Findings: No lesion.   Neurological:      General: No focal deficit present.      Mental Status: She is alert and oriented to person, place, and time. Mental status is at baseline.      Cranial Nerves: No cranial nerve deficit.      Sensory: No sensory deficit.      Motor: No weakness.      Coordination: Coordination normal.      Gait: Gait normal.      Deep Tendon Reflexes: Reflexes normal.   Psychiatric:         Mood and Affect: Mood normal.         Behavior: Behavior normal.         Thought Content: Thought content normal.         Judgment: Judgment normal.               No results found for: WBC, HGB, HCT, MCV, PLT       Sodium   Date Value Ref Range Status   07/28/2021 137 136 - 145 mmol/L Final     Potassium   Date Value Ref Range Status   07/28/2021 4.1 3.5 - 5.1 mmol/L Final     Chloride   Date Value  Ref Range Status   07/28/2021 99 98 - 107 mmol/L Final     CO2   Date Value Ref Range Status   07/28/2021 32 21 - 32 mmol/L Final     Glucose   Date Value Ref Range Status   07/28/2021 121 (H) 74 - 106 mg/dL Final     BUN   Date Value Ref Range Status   07/28/2021 11 7 - 18 mg/dL Final     Creatinine   Date Value Ref Range Status   07/28/2021 0.96 0.55 - 1.02 mg/dL Final     Calcium   Date Value Ref Range Status   11/16/2021 8.7 8.5 - 10.1 mg/dL Final     Anion Gap   Date Value Ref Range Status   07/28/2021 10 7 - 16 mmol/L Final     eGFR   Date Value Ref Range Status   07/28/2021 60 >=60 mL/min/1.73m² Final      DXA Bone Density Spine And Hip  Narrative: EXAMINATION:  DEXA BONE DENSITY SPINE HIP    CLINICAL HISTORY:  Age-related osteoporosis without current pathological fracture    COMPARISON:  None    FINDINGS:  No significant imaging findings.    L1-L4 spine:    BMD: 0.982 g/cm^2    T-score: -0.6    Z-score: 1.9    Left femur/hip:    BMD: 0.561 g/cm^2    T-score: -2.6    Z-score: -0.4  Impression: T score of -2.6 obtained from the left femoral neck which is considered in the range of osteoporosis.    Recommendations:    1. Encourage adequate intake of calcium and vitamin D if clinically appropriate.    2. Consider regular weight-bearing and muscle strengthening exercises if clinically appropriate.    3. Consider hormone replacement therapy if clinically appropriate.    4. Consider antiresorptive therapy if clinically appropriate.    5. Consider followup BMD every 1-2 years if clinically appropriate.    6. Advise patient to avoid tobacco smoking and to not over use alcohol.    Place of service: [facility].    Electronically signed by: Mack Godoy  Date:    07/01/2021  Time:    11:46     Procedures   Assessment and Plan (including Health Maintenance)      Problem List  Smart Sets  Document Outside HM   :    Plan:         Health Maintenance Due   Topic Date Due    Hepatitis C Screening  Never done    Lipid  Panel  Never done    COVID-19 Vaccine (1) Never done    Pneumococcal Vaccines (Age 65+) (1 of 2 - PPSV23) Never done    TETANUS VACCINE  Never done    Shingles Vaccine (1 of 2) Never done       Problem List Items Addressed This Visit        ENT    Bilateral chronic serous otitis media - Primary    Current Assessment & Plan     Patient has bilateral chronic serous otitis media.  We have used ear drops that have not helped and so will start her on Atrovent nasal spray to see if we can open her eustachian tubes.  Follow-up in 2 weeks if she is not improved.  Consider referral to ear nose and throat if we can resolve the issue.         Relevant Medications    methylPREDNISolone acetate injection 40 mg (Start on 1/19/2022  9:45 AM)    ipratropium (ATROVENT) 42 mcg (0.06 %) nasal spray       Orthopedic    Osteoarthritis    Current Assessment & Plan     Patient has low back pain and one history of the same and she is treated with the pain medications.  She denies any fever or chills.  We will give her a Depo-Medrol 40 IM today maintain her on her current other medicines.  Follow-up p.r.n.         Relevant Medications    methylPREDNISolone acetate injection 40 mg (Start on 1/19/2022  9:45 AM)    ipratropium (ATROVENT) 42 mcg (0.06 %) nasal spray          Health Maintenance Topics with due status: Not Due       Topic Last Completion Date    DEXA SCAN 07/01/2021       Future Appointments   Date Time Provider Department Center   4/20/2022  9:00 AM Dominic Montesinos DO [facility]   5/24/2022 10:00 AM INFUSION, [facility] INF[facility] Eduardo [place]   5/26/2022 10:[facility] [place]        Follow up in about 4 weeks (around 2/16/2022).     Signature:  DO [facility]  [address] [facility] [place]  [address]    Date of encounter: 1/19/22

## 2022-01-19 NOTE — ASSESSMENT & PLAN NOTE
Patient has bilateral chronic serous otitis media.  We have used ear drops that have not helped and so will start her on Atrovent nasal spray to see if we can open her eustachian tubes.  Follow-up in 2 weeks if she is not improved.  Consider referral to ear nose and throat if we can resolve the issue.

## 2022-03-31 DIAGNOSIS — J44.9 CHRONIC OBSTRUCTIVE PULMONARY DISEASE, UNSPECIFIED COPD TYPE: Primary | ICD-10-CM

## 2022-03-31 RX ORDER — ALBUTEROL SULFATE 90 UG/1
AEROSOL, METERED RESPIRATORY (INHALATION)
Qty: 18 G | Refills: 2 | Status: SHIPPED | OUTPATIENT
Start: 2022-03-31 | End: 2022-06-27 | Stop reason: SDUPTHER

## 2022-04-20 ENCOUNTER — OFFICE VISIT (OUTPATIENT)
Dept: FAMILY MEDICINE | Facility: CLINIC | Age: 77
End: 2022-04-20
Payer: COMMERCIAL

## 2022-04-20 VITALS
SYSTOLIC BLOOD PRESSURE: 132 MMHG | HEIGHT: 67 IN | OXYGEN SATURATION: 98 % | BODY MASS INDEX: 31.71 KG/M2 | TEMPERATURE: 97 F | RESPIRATION RATE: 18 BRPM | WEIGHT: 202 LBS | HEART RATE: 50 BPM | DIASTOLIC BLOOD PRESSURE: 78 MMHG

## 2022-04-20 DIAGNOSIS — J40 BRONCHITIS: ICD-10-CM

## 2022-04-20 DIAGNOSIS — H65.23 BILATERAL CHRONIC SEROUS OTITIS MEDIA: ICD-10-CM

## 2022-04-20 DIAGNOSIS — I10 ESSENTIAL HYPERTENSION: Primary | ICD-10-CM

## 2022-04-20 PROCEDURE — 99214 OFFICE O/P EST MOD 30 MIN: CPT | Mod: 25,,, | Performed by: FAMILY MEDICINE

## 2022-04-20 PROCEDURE — 3078F PR MOST RECENT DIASTOLIC BLOOD PRESSURE < 80 MM HG: ICD-10-PCS | Mod: ,,, | Performed by: FAMILY MEDICINE

## 2022-04-20 PROCEDURE — 1159F MED LIST DOCD IN RCRD: CPT | Mod: ,,, | Performed by: FAMILY MEDICINE

## 2022-04-20 PROCEDURE — 96372 THER/PROPH/DIAG INJ SC/IM: CPT | Mod: ,,, | Performed by: FAMILY MEDICINE

## 2022-04-20 PROCEDURE — 99214 PR OFFICE/OUTPT VISIT, EST, LEVL IV, 30-39 MIN: ICD-10-PCS | Mod: 25,,, | Performed by: FAMILY MEDICINE

## 2022-04-20 PROCEDURE — 3075F SYST BP GE 130 - 139MM HG: CPT | Mod: ,,, | Performed by: FAMILY MEDICINE

## 2022-04-20 PROCEDURE — 3078F DIAST BP <80 MM HG: CPT | Mod: ,,, | Performed by: FAMILY MEDICINE

## 2022-04-20 PROCEDURE — 3075F PR MOST RECENT SYSTOLIC BLOOD PRESS GE 130-139MM HG: ICD-10-PCS | Mod: ,,, | Performed by: FAMILY MEDICINE

## 2022-04-20 PROCEDURE — 96372 PR INJECTION,THERAP/PROPH/DIAG2ST, IM OR SUBCUT: ICD-10-PCS | Mod: ,,, | Performed by: FAMILY MEDICINE

## 2022-04-20 PROCEDURE — 1159F PR MEDICATION LIST DOCUMENTED IN MEDICAL RECORD: ICD-10-PCS | Mod: ,,, | Performed by: FAMILY MEDICINE

## 2022-04-20 RX ORDER — DOXYCYCLINE 100 MG/1
100 CAPSULE ORAL 2 TIMES DAILY
Qty: 20 CAPSULE | Refills: 0 | Status: SHIPPED | OUTPATIENT
Start: 2022-04-20 | End: 2022-07-20 | Stop reason: ALTCHOICE

## 2022-04-20 RX ORDER — METHYLPREDNISOLONE ACETATE 40 MG/ML
40 INJECTION, SUSPENSION INTRA-ARTICULAR; INTRALESIONAL; INTRAMUSCULAR; SOFT TISSUE
Status: COMPLETED | OUTPATIENT
Start: 2022-04-20 | End: 2022-04-20

## 2022-04-20 RX ADMIN — METHYLPREDNISOLONE ACETATE 40 MG: 40 INJECTION, SUSPENSION INTRA-ARTICULAR; INTRALESIONAL; INTRAMUSCULAR; SOFT TISSUE at 09:04

## 2022-04-20 NOTE — ASSESSMENT & PLAN NOTE
Mild bronchitis so will give her Depo-Medrol 40 IM and doxycycline 100 mg twice a day follow-up on a p.r.n. basis.

## 2022-04-20 NOTE — PROGRESS NOTES
Dominic Montesinos DO   [address]  [place]  [address]      PATIENT NAME: Felicia Keita  : 1945  DATE: 22  MRN: [id number]      Billing Provider: Dominic Montesinos DO  Level of Service:   Patient PCP Information     Provider PCP Type    Dominic Montesinos DO General          Reason for Visit / Chief Complaint: Hyperlipidemia (3 month follow up ), Hypertension, and Gastroesophageal Reflux       Update PCP  Update Chief Complaint         History of Present Illness / Problem Focused Workflow     Felicia Keita presents to the clinic with Hyperlipidemia (3 month follow up ), Hypertension, and Gastroesophageal Reflux     Patient presents today with follow-up on hypertension hyperlipidemia and GERD.  She has been doing well with those problems but she does have continued pressure in her ears at with a cough and some congestion with the white sputum.  She denies any nausea vomiting.  She does have some mild shortness of breath but no PND orthopnea.      Review of Systems     Review of Systems   Constitutional: Negative for activity change, appetite change, chills, fatigue and fever.   HENT: Positive for nasal congestion. Negative for ear discharge, ear pain, mouth dryness, mouth sores, postnasal drip, sinus pressure/congestion, sore throat and voice change.    Eyes: Negative for pain, discharge, redness, itching and visual disturbance.   Respiratory: Positive for cough. Negative for apnea, chest tightness, shortness of breath and wheezing.    Cardiovascular: Negative for chest pain, palpitations and leg swelling.   Gastrointestinal: Negative for abdominal distention, abdominal pain, anal bleeding, blood in stool, change in bowel habit, constipation, diarrhea, nausea, vomiting, reflux and change in bowel habit.   Endocrine: Negative for cold intolerance, heat intolerance, polydipsia, polyphagia and polyuria.   Genitourinary: Negative for difficulty urinating, enuresis, frequency,  genital sores, hematuria, hot flashes, menstrual irregularity, urgency and vaginal dryness.   Musculoskeletal: Positive for back pain. Negative for arthralgias, gait problem, leg pain, myalgias and neck pain.   Integumentary:  Negative for rash, mole/lesion, breast mass, breast discharge and breast tenderness.   Allergic/Immunologic: Negative for environmental allergies and food allergies.   Neurological: Negative for dizziness, vertigo, tremors, seizures, syncope, facial asymmetry, speech difficulty, weakness, light-headedness, numbness, headaches, disturbances in coordination, memory loss and coordination difficulties.   Hematological: Negative for adenopathy. Does not bruise/bleed easily.   Psychiatric/Behavioral: Negative for agitation, behavioral problems, confusion, decreased concentration, dysphoric mood, hallucinations, self-injury, sleep disturbance and suicidal ideas. The patient is not nervous/anxious and is not hyperactive.    Breast: Negative for mass and tenderness      Medical / Social / Family History     Past Medical History:   Diagnosis Date    Abnormal gait 05/22/2013    Actinic keratosis 08/12/2020    L forearm     Allergic rhinitis 04/07/2020    Blepharophimosis 04/22/2014    senile    Cervical somatic dysfunction 08/15/2017    Cervicalgia 03/28/2019    Chronic peripheral venous hypertension 06/16/2015    Chronic serous otitis media, bilateral 09/05/2019    Chronic venous hypertension (idiopathic) without complications of unspecified lower extremity 08/15/2017    Conjunctival hemorrhage, right eye 11/02/2020    Deep venous thrombosis of lower extremity 09/17/2012    Degeneration of cervical intervertebral disc 10/21/2011    Degeneration of lumbar intervertebral disc 10/21/2011    Degenerative joint disease involving multiple joints 07/25/2011    Depressive disorder 07/25/2011    Essential hypertension 08/12/2020    Essential tremor 06/24/2014    GERD without esophagitis  08/15/2017    Headache 04/07/2020    Hyperlipidemia 05/08/2012    Insomnia 04/06/2016    Osteoarthritis 01/04/2017    Osteoporosis 11/07/2017    Otalgia, right ear 10/17/2016    Other cervical disc degeneration, unspecified cervical region 08/15/2017    Overflow incontinence 01/06/2020    Pain in right knee 01/06/2020    osteoarthritis    Pain in right leg 09/10/2020    Peripheral arterial occlusive disease 02/24/2015    Peripheral vascular disease, unspecified 08/06/2019    Peripheral venous insufficiency 09/29/2015    Personal history of PE (pulmonary embolism) 08/06/2019    Presence of vena cava filter 03/2015    Pulmonary embolus 03/10/2015    Pulmonary infarction 03/16/2012    Pure hypercholesterolemia 07/25/2011    Restless legs 05/22/2018    Right temporomandibular joint disorder, unspecified 08/18/2020    Rosacea 07/01/2015    Sciatica, right side 07/06/2016    Seborrheic keratosis 01/07/2021    Segmental and somatic dysfunction of thoracic region 03/28/2019    Senile osteoporosis 04/24/2017    Spasm of back muscles 07/25/2011    Trochanteric bursitis of right hip 01/06/2020    Unspecified urinary incontinence 02/07/2020    Vitamin D deficiency 10/27/2014       Past Surgical History:   Procedure Laterality Date    APPENDECTOMY      bone density  06/27/2019    Ajith/Abiel    CARPAL TUNNEL RELEASE      BLI    LUMBAR LAMINECTOMY      prolia  03/12/2019    1mg    remove cataract Right     insert lens    TONSILLECTOMY      TOTAL ABDOMINAL HYSTERECTOMY      VAGINAL DELIVERY      vascular surgery procedure       Right SSV Laser Ablation performed by Dr. Carlo hernandez screen  05/24/2018       Social History  Ms.  reports that she has never smoked. She has never used smokeless tobacco. She reports that she does not drink alcohol and does not use drugs.    Family History  Ms.'s family history includes COPD in her daughter and sister; Cancer in her brother; Emphysema in her  father; Hearing loss in her father; Hypertension in her father and sister; Melanoma in her brother; Rheum arthritis in her daughter and sister; Skin cancer in her father; Stomach cancer in her sister.    Medications and Allergies     Medications  Outpatient Medications Marked as Taking for the 4/20/22 encounter (Office Visit) with Dominic Montesinos, DO   Medication Sig Dispense Refill    albuterol (PROVENTIL/VENTOLIN HFA) 90 mcg/actuation inhaler INHALE 1-2 PUFFS BY MOUTH EVERY 4 HOURS AS NEEDED 18 g 2    amLODIPine (NORVASC) 5 MG tablet Take 1 tablet (5 mg total) by mouth 2 (two) times daily. 180 tablet 1    ascorbic acid, vitamin C, (VITAMIN C) 500 MG tablet Take 500 mg by mouth once daily.      atorvastatin (LIPITOR) 40 MG tablet Take 1 tablet (40 mg total) by mouth once daily. 90 tablet 1    calcium-vitamin D tablet 600 mg-200 units Take 1 tablet by mouth 2 (two) times a day.      celecoxib (CELEBREX) 200 MG capsule Take 1 capsule (200 mg total) by mouth once daily. 90 capsule 1    denosumab (PROLIA) 60 mg/mL Syrg Inject 60 mg into the skin every 6 (six) months.      desvenlafaxine succinate (PRISTIQ) 100 MG Tb24 Take 1 tablet (100 mg total) by mouth once daily. 90 tablet 1    diclofenac sodium (VOLTAREN) 1 % Gel Apply 4 grams to large joints 4 times daily 500 g 5    furosemide (LASIX) 20 MG tablet Take 1 tablet (20 mg total) by mouth once daily. 90 tablet 1    gabapentin (NEURONTIN) 300 MG capsule TAKE 1 CAPSULE BY MOUTH IN THE MORNING, TAKE 1 CAPSULE at LUNCH, AND TAKE 2 CAPSULES AT BEDTIME      HYDROcodone-acetaminophen (NORCO)  mg per tablet 05/27/21 TAKE 1 TABLET BY MOUTH EVERY 8 HOURS AS NEEDED      ipratropium (ATROVENT) 42 mcg (0.06 %) nasal spray 2 sprays by Nasal route 3 (three) times daily. 45 mL 3    losartan (COZAAR) 100 MG tablet Take 1 tablet (100 mg total) by mouth once daily. 90 tablet 1    omeprazole (PRILOSEC) 20 MG capsule Take 1 capsule (20 mg total) by mouth once daily.  90 capsule 1    pramipexole (MIRAPEX) 0.125 MG tablet Take 2 tablets (0.25 mg total) by mouth nightly. 60 tablet 5    primidone (MYSOLINE) 50 MG Tab TAKE 2 TABLETS BY MOUTH EVERY MORNING AND TAKE 3 TABLETS BY MOUTH EVERY EVENING 450 tablet 1    propranoloL (INDERAL) 40 MG tablet Take 1 tablet (40 mg total) by mouth 2 (two) times daily. 180 tablet 1    tiZANidine (ZANAFLEX) 4 MG tablet Take 1 tablet (4 mg total) by mouth 2 (two) times daily as needed. 30 tablet 3    XARELTO 20 mg Tab Take 1 tablet (20 mg total) by mouth once daily. 90 tablet 1    zinc gluconate 50 mg tablet Take 50 mg by mouth once daily.       Current Facility-Administered Medications for the 4/20/22 encounter (Office Visit) with Dominic Montesinos DO   Medication Dose Route Frequency Provider Last Rate Last Admin    [COMPLETED] methylPREDNISolone acetate injection 40 mg  40 mg Intramuscular 1 time in Clinic/HOD Dominic Montesinos DO   40 mg at 04/20/22 0956       Allergies  Review of patient's allergies indicates:  No Known Allergies    Physical Examination     Vitals:    04/20/22 0904   BP: 132/78   Pulse: (!) 50   Resp: 18   Temp: 97.1 °F (36.2 °C)     Physical Exam  Vitals reviewed.   Constitutional:       General: She is not in acute distress.     Appearance: Normal appearance. She is obese.   HENT:      Head: Normocephalic and atraumatic.      Nose: Nose normal.      Mouth/Throat:      Mouth: Mucous membranes are moist.      Pharynx: Oropharynx is clear. No oropharyngeal exudate or posterior oropharyngeal erythema.   Eyes:      General: No scleral icterus.     Extraocular Movements: Extraocular movements intact.      Conjunctiva/sclera: Conjunctivae normal.      Pupils: Pupils are equal, round, and reactive to light.   Neck:      Vascular: No carotid bruit.   Cardiovascular:      Rate and Rhythm: Normal rate and regular rhythm.      Pulses: Normal pulses.      Heart sounds: Normal heart sounds.     No gallop.   Pulmonary:      Effort:  Pulmonary effort is normal. No respiratory distress.      Breath sounds: Normal breath sounds. No wheezing.   Abdominal:      General: Abdomen is flat. Bowel sounds are normal.      Palpations: Abdomen is soft.      Tenderness: There is no abdominal tenderness.   Musculoskeletal:         General: Normal range of motion.      Cervical back: Normal range of motion and neck supple.      Right lower leg: No edema.      Left lower leg: No edema.   Lymphadenopathy:      Cervical: No cervical adenopathy.   Skin:     General: Skin is warm and dry.      Capillary Refill: Capillary refill takes less than 2 seconds.      Coloration: Skin is not jaundiced.      Findings: No lesion.   Neurological:      General: No focal deficit present.      Mental Status: She is alert and oriented to person, place, and time. Mental status is at baseline.      Cranial Nerves: No cranial nerve deficit.      Sensory: No sensory deficit.      Motor: No weakness.      Coordination: Coordination normal.      Gait: Gait normal.      Deep Tendon Reflexes: Reflexes normal.   Psychiatric:         Mood and Affect: Mood normal.         Behavior: Behavior normal.         Thought Content: Thought content normal.         Judgment: Judgment normal.     Her          No results found for: WBC, HGB, HCT, MCV, PLT       Sodium   Date Value Ref Range Status   07/28/2021 137 136 - 145 mmol/L Final     Potassium   Date Value Ref Range Status   07/28/2021 4.1 3.5 - 5.1 mmol/L Final     Chloride   Date Value Ref Range Status   07/28/2021 99 98 - 107 mmol/L Final     CO2   Date Value Ref Range Status   07/28/2021 32 21 - 32 mmol/L Final     Glucose   Date Value Ref Range Status   07/28/2021 121 (H) 74 - 106 mg/dL Final     BUN   Date Value Ref Range Status   07/28/2021 11 7 - 18 mg/dL Final     Creatinine   Date Value Ref Range Status   07/28/2021 0.96 0.55 - 1.02 mg/dL Final     Calcium   Date Value Ref Range Status   11/16/2021 8.7 8.5 - 10.1 mg/dL Final     Anion  Gap   Date Value Ref Range Status   07/28/2021 10 7 - 16 mmol/L Final     eGFR   Date Value Ref Range Status   07/28/2021 60 >=60 mL/min/1.73m² Final      DXA Bone Density Spine And Hip  Narrative: EXAMINATION:  DEXA BONE DENSITY SPINE HIP    CLINICAL HISTORY:  Age-related osteoporosis without current pathological fracture    COMPARISON:  None    FINDINGS:  No significant imaging findings.    L1-L4 spine:    BMD: 0.982 g/cm^2    T-score: -0.6    Z-score: 1.9    Left femur/hip:    BMD: 0.561 g/cm^2    T-score: -2.6    Z-score: -0.4  Impression: T score of -2.6 obtained from the left femoral neck which is considered in the range of osteoporosis.    Recommendations:    1. Encourage adequate intake of calcium and vitamin D if clinically appropriate.    2. Consider regular weight-bearing and muscle strengthening exercises if clinically appropriate.    3. Consider hormone replacement therapy if clinically appropriate.    4. Consider antiresorptive therapy if clinically appropriate.    5. Consider followup BMD every 1-2 years if clinically appropriate.    6. Advise patient to avoid tobacco smoking and to not over use alcohol.    Place of service: [facility].    Electronically signed by: Mack Godoy  Date:    07/01/2021  Time:    11:46     Procedures   Assessment and Plan (including Health Maintenance)      Problem List  Smart Sets  Document Outside HM   :    Plan:         Health Maintenance Due   Topic Date Due    Hepatitis C Screening  Never done    Lipid Panel  Never done    COVID-19 Vaccine (1) Never done    TETANUS VACCINE  Never done    Shingles Vaccine (1 of 2) Never done    Pneumococcal Vaccines (Age 65+) (1 - PCV) Never done       Problem List Items Addressed This Visit        ENT    Bilateral chronic serous otitis media    Current Assessment & Plan     History of years of bothering her with chronic serous otitis.  Start her on doxycycline 100 mg twice a day.  Will give her a Depo-Medrol shot which will  help with her congestion and her cough.              Pulmonary    Bronchitis    Current Assessment & Plan     Mild bronchitis so will give her Depo-Medrol 40 IM and doxycycline 100 mg twice a day follow-up on a p.r.n. basis.           Relevant Medications    methylPREDNISolone acetate injection 40 mg (Completed)    doxycycline (VIBRAMYCIN) 100 MG Cap       Cardiac/Vascular    Essential hypertension - Primary    Current Assessment & Plan     Patient is in for follow-up on her hypertension.  Blood pressure well controlled.  No change in medication.  Follow-up in 3 months.                 Health Maintenance Topics with due status: Not Due       Topic Last Completion Date    DEXA Scan 07/01/2021       Future Appointments   Date Time Provider Department Center   5/24/2022 10:00 AM INFUSION, [facility]ardo [facility]    5/26/2022 10:00 AM AWV NURSE [facility] [place]   7/20/2022  9:30 AM Dominic Montesinos DO [facility] [place]        Follow up in about 3 months (around 7/20/2022).     Signature:  DO [facility]  [id number] [address] [place]  [address]    Date of encounter: 4/20/22

## 2022-04-20 NOTE — ASSESSMENT & PLAN NOTE
Patient is in for follow-up on her hypertension.  Blood pressure well controlled.  No change in medication.  Follow-up in 3 months.

## 2022-04-20 NOTE — ASSESSMENT & PLAN NOTE
History of years of bothering her with chronic serous otitis.  Start her on doxycycline 100 mg twice a day.  Will give her a Depo-Medrol shot which will help with her congestion and her cough.

## 2022-05-03 ENCOUNTER — TELEPHONE (OUTPATIENT)
Dept: FAMILY MEDICINE | Facility: CLINIC | Age: 77
End: 2022-05-03
Payer: MEDICAID

## 2022-05-10 DIAGNOSIS — Z71.89 COMPLEX CARE COORDINATION: ICD-10-CM

## 2022-05-24 ENCOUNTER — INFUSION (OUTPATIENT)
Dept: INFUSION THERAPY | Facility: HOSPITAL | Age: 77
End: 2022-05-24
Attending: FAMILY MEDICINE
Payer: MEDICARE

## 2022-05-24 VITALS
HEART RATE: 62 BPM | OXYGEN SATURATION: 96 % | WEIGHT: 198.19 LBS | TEMPERATURE: 98 F | RESPIRATION RATE: 18 BRPM | HEIGHT: 67 IN | BODY MASS INDEX: 31.11 KG/M2

## 2022-05-24 DIAGNOSIS — M81.0 AGE-RELATED OSTEOPOROSIS WITHOUT CURRENT PATHOLOGICAL FRACTURE: ICD-10-CM

## 2022-05-24 DIAGNOSIS — M81.0 OSTEOPOROSIS, UNSPECIFIED OSTEOPOROSIS TYPE, UNSPECIFIED PATHOLOGICAL FRACTURE PRESENCE: Primary | ICD-10-CM

## 2022-05-24 LAB — CALCIUM SERPL-MCNC: 8.8 MG/DL (ref 8.5–10.1)

## 2022-05-24 PROCEDURE — 96372 THER/PROPH/DIAG INJ SC/IM: CPT

## 2022-05-24 PROCEDURE — 36415 COLL VENOUS BLD VENIPUNCTURE: CPT | Performed by: FAMILY MEDICINE

## 2022-05-24 PROCEDURE — 63600175 PHARM REV CODE 636 W HCPCS: Performed by: FAMILY MEDICINE

## 2022-05-24 PROCEDURE — 82310 ASSAY OF CALCIUM: CPT | Performed by: FAMILY MEDICINE

## 2022-05-24 RX ADMIN — DENOSUMAB 60 MG: 60 INJECTION SUBCUTANEOUS at 10:05

## 2022-05-24 NOTE — PROGRESS NOTES
1010  Patient in room.  Medications and history reviewed.  Calcium level today is 8.8    1042  Administered Prolia 60mg SQ to left upper arm per protocol.  Patient tolerated well.      1102  Patient discharged to home ambulatory with appointment in hand to return in 6 months for next prolia injection.

## 2022-05-25 DIAGNOSIS — G25.81 RESTLESS LEGS: ICD-10-CM

## 2022-05-25 RX ORDER — PRAMIPEXOLE DIHYDROCHLORIDE 0.12 MG/1
0.25 TABLET ORAL NIGHTLY
Qty: 60 TABLET | Refills: 5 | Status: SHIPPED | OUTPATIENT
Start: 2022-05-25 | End: 2022-07-20 | Stop reason: SDUPTHER

## 2022-06-27 DIAGNOSIS — I82.592 CHRONIC DEEP VEIN THROMBOSIS (DVT) OF OTHER VEIN OF LEFT LOWER EXTREMITY: ICD-10-CM

## 2022-06-27 DIAGNOSIS — E78.2 MIXED HYPERLIPIDEMIA: ICD-10-CM

## 2022-06-27 DIAGNOSIS — M16.11 PRIMARY OSTEOARTHRITIS OF RIGHT HIP: ICD-10-CM

## 2022-06-27 DIAGNOSIS — I10 ESSENTIAL HYPERTENSION: ICD-10-CM

## 2022-06-27 DIAGNOSIS — J44.9 CHRONIC OBSTRUCTIVE PULMONARY DISEASE, UNSPECIFIED COPD TYPE: ICD-10-CM

## 2022-06-27 RX ORDER — LOSARTAN POTASSIUM 100 MG/1
100 TABLET ORAL DAILY
Qty: 30 TABLET | Refills: 0 | Status: SHIPPED | OUTPATIENT
Start: 2022-06-27 | End: 2022-07-20 | Stop reason: SDUPTHER

## 2022-06-27 RX ORDER — ALBUTEROL SULFATE 90 UG/1
1-2 AEROSOL, METERED RESPIRATORY (INHALATION) EVERY 4 HOURS PRN
Qty: 18 G | Refills: 0 | Status: SHIPPED | OUTPATIENT
Start: 2022-06-27 | End: 2022-07-20 | Stop reason: SDUPTHER

## 2022-06-27 RX ORDER — PROPRANOLOL HYDROCHLORIDE 40 MG/1
40 TABLET ORAL 2 TIMES DAILY
Qty: 60 TABLET | Refills: 0 | Status: SHIPPED | OUTPATIENT
Start: 2022-06-27 | End: 2022-07-20 | Stop reason: SDUPTHER

## 2022-06-27 RX ORDER — DICLOFENAC SODIUM 10 MG/G
GEL TOPICAL
Qty: 450 G | Refills: 0 | Status: SHIPPED | OUTPATIENT
Start: 2022-06-27 | End: 2022-07-27 | Stop reason: SDUPTHER

## 2022-06-27 RX ORDER — ATORVASTATIN CALCIUM 40 MG/1
40 TABLET, FILM COATED ORAL DAILY
Qty: 30 TABLET | Refills: 0 | Status: SHIPPED | OUTPATIENT
Start: 2022-06-27 | End: 2022-07-20 | Stop reason: SDUPTHER

## 2022-06-27 RX ORDER — RIVAROXABAN 20 MG/1
20 TABLET, FILM COATED ORAL DAILY
Qty: 30 TABLET | Refills: 0 | Status: SHIPPED | OUTPATIENT
Start: 2022-06-27 | End: 2022-07-20 | Stop reason: SDUPTHER

## 2022-07-20 ENCOUNTER — OFFICE VISIT (OUTPATIENT)
Dept: FAMILY MEDICINE | Facility: CLINIC | Age: 77
End: 2022-07-20
Payer: MEDICARE

## 2022-07-20 VITALS
RESPIRATION RATE: 22 BRPM | HEART RATE: 64 BPM | WEIGHT: 199.81 LBS | DIASTOLIC BLOOD PRESSURE: 74 MMHG | SYSTOLIC BLOOD PRESSURE: 132 MMHG | HEIGHT: 67 IN | BODY MASS INDEX: 31.36 KG/M2 | OXYGEN SATURATION: 99 %

## 2022-07-20 DIAGNOSIS — R60.9 EDEMA, UNSPECIFIED TYPE: ICD-10-CM

## 2022-07-20 DIAGNOSIS — H65.23 BILATERAL CHRONIC SEROUS OTITIS MEDIA: ICD-10-CM

## 2022-07-20 DIAGNOSIS — I82.592 CHRONIC DEEP VEIN THROMBOSIS (DVT) OF OTHER VEIN OF LEFT LOWER EXTREMITY: ICD-10-CM

## 2022-07-20 DIAGNOSIS — I10 ESSENTIAL HYPERTENSION: ICD-10-CM

## 2022-07-20 DIAGNOSIS — E78.2 MIXED HYPERLIPIDEMIA: ICD-10-CM

## 2022-07-20 DIAGNOSIS — R73.9 HYPERGLYCEMIA: Primary | ICD-10-CM

## 2022-07-20 DIAGNOSIS — M54.9 BACK PAIN, UNSPECIFIED BACK LOCATION, UNSPECIFIED BACK PAIN LATERALITY, UNSPECIFIED CHRONICITY: ICD-10-CM

## 2022-07-20 DIAGNOSIS — F33.0 MILD EPISODE OF RECURRENT MAJOR DEPRESSIVE DISORDER: ICD-10-CM

## 2022-07-20 DIAGNOSIS — I10 HYPERTENSION, UNSPECIFIED TYPE: ICD-10-CM

## 2022-07-20 DIAGNOSIS — H92.01 OTALGIA, RIGHT EAR: ICD-10-CM

## 2022-07-20 DIAGNOSIS — J44.9 CHRONIC OBSTRUCTIVE PULMONARY DISEASE, UNSPECIFIED COPD TYPE: ICD-10-CM

## 2022-07-20 DIAGNOSIS — K21.9 GERD WITHOUT ESOPHAGITIS: ICD-10-CM

## 2022-07-20 DIAGNOSIS — M16.11 PRIMARY OSTEOARTHRITIS OF RIGHT HIP: ICD-10-CM

## 2022-07-20 DIAGNOSIS — G25.81 RESTLESS LEGS: ICD-10-CM

## 2022-07-20 LAB
ALBUMIN SERPL BCP-MCNC: 3.8 G/DL (ref 3.5–5)
ALBUMIN/GLOB SERPL: 1.2 {RATIO}
ALP SERPL-CCNC: 94 U/L (ref 55–142)
ALT SERPL W P-5'-P-CCNC: 24 U/L (ref 13–56)
ANION GAP SERPL CALCULATED.3IONS-SCNC: 10 MMOL/L (ref 7–16)
AST SERPL W P-5'-P-CCNC: 23 U/L (ref 15–37)
BASOPHILS # BLD AUTO: 0.1 K/UL (ref 0–0.2)
BASOPHILS NFR BLD AUTO: 1.7 % (ref 0–1)
BILIRUB SERPL-MCNC: 0.4 MG/DL (ref 0–1.2)
BUN SERPL-MCNC: 9 MG/DL (ref 7–18)
BUN/CREAT SERPL: 10 (ref 6–20)
CALCIUM SERPL-MCNC: 9.2 MG/DL (ref 8.5–10.1)
CHLORIDE SERPL-SCNC: 98 MMOL/L (ref 98–107)
CHOLEST SERPL-MCNC: 169 MG/DL (ref 0–200)
CHOLEST/HDLC SERPL: 1.9 {RATIO}
CO2 SERPL-SCNC: 29 MMOL/L (ref 21–32)
CREAT SERPL-MCNC: 0.92 MG/DL (ref 0.55–1.02)
CRP SERPL-MCNC: <0.29 MG/DL (ref 0–0.8)
DIFFERENTIAL METHOD BLD: ABNORMAL
EOSINOPHIL # BLD AUTO: 0.22 K/UL (ref 0–0.5)
EOSINOPHIL NFR BLD AUTO: 3.8 % (ref 1–4)
ERYTHROCYTE [DISTWIDTH] IN BLOOD BY AUTOMATED COUNT: 14 % (ref 11.5–14.5)
ERYTHROCYTE [SEDIMENTATION RATE] IN BLOOD BY WESTERGREN METHOD: 5 MM/HR (ref 0–30)
EST. AVERAGE GLUCOSE BLD GHB EST-MCNC: 100 MG/DL
GLOBULIN SER-MCNC: 3.2 G/DL (ref 2–4)
GLUCOSE SERPL-MCNC: 91 MG/DL (ref 74–106)
HBA1C MFR BLD HPLC: 5.6 % (ref 4.5–6.6)
HCT VFR BLD AUTO: 40.8 % (ref 38–47)
HDLC SERPL-MCNC: 91 MG/DL (ref 40–60)
HGB BLD-MCNC: 13.3 G/DL (ref 12–16)
IMM GRANULOCYTES # BLD AUTO: 0.01 K/UL (ref 0–0.04)
IMM GRANULOCYTES NFR BLD: 0.2 % (ref 0–0.4)
LDLC SERPL CALC-MCNC: 66 MG/DL
LDLC/HDLC SERPL: 0.7 {RATIO}
LYMPHOCYTES # BLD AUTO: 1.76 K/UL (ref 1–4.8)
LYMPHOCYTES NFR BLD AUTO: 30 % (ref 27–41)
MCH RBC QN AUTO: 28.7 PG (ref 27–31)
MCHC RBC AUTO-ENTMCNC: 32.6 G/DL (ref 32–36)
MCV RBC AUTO: 88.1 FL (ref 80–96)
MONOCYTES # BLD AUTO: 0.66 K/UL (ref 0–0.8)
MONOCYTES NFR BLD AUTO: 11.3 % (ref 2–6)
MPC BLD CALC-MCNC: 11.2 FL (ref 9.4–12.4)
NEUTROPHILS # BLD AUTO: 3.11 K/UL (ref 1.8–7.7)
NEUTROPHILS NFR BLD AUTO: 53 % (ref 53–65)
NONHDLC SERPL-MCNC: 78 MG/DL
NRBC # BLD AUTO: 0 X10E3/UL
NRBC, AUTO (.00): 0 %
PLATELET # BLD AUTO: 275 K/UL (ref 150–400)
POTASSIUM SERPL-SCNC: 5.3 MMOL/L (ref 3.5–5.1)
PROT SERPL-MCNC: 7 G/DL (ref 6.4–8.2)
RBC # BLD AUTO: 4.63 M/UL (ref 4.2–5.4)
SODIUM SERPL-SCNC: 132 MMOL/L (ref 136–145)
T4 SERPL-MCNC: 10.8 ΜG/DL (ref 4.8–13.9)
TRIGL SERPL-MCNC: 58 MG/DL (ref 35–150)
TSH SERPL DL<=0.005 MIU/L-ACNC: 1.64 UIU/ML (ref 0.36–3.74)
URATE SERPL-MCNC: 4.4 MG/DL (ref 2.6–6)
VLDLC SERPL-MCNC: 12 MG/DL
WBC # BLD AUTO: 5.86 K/UL (ref 4.5–11)

## 2022-07-20 PROCEDURE — 84436 T4: ICD-10-PCS | Mod: ,,, | Performed by: CLINICAL MEDICAL LABORATORY

## 2022-07-20 PROCEDURE — 86140 C-REACTIVE PROTEIN: ICD-10-PCS | Mod: ,,, | Performed by: CLINICAL MEDICAL LABORATORY

## 2022-07-20 PROCEDURE — 85651 RBC SED RATE NONAUTOMATED: CPT | Mod: ,,, | Performed by: CLINICAL MEDICAL LABORATORY

## 2022-07-20 PROCEDURE — 84443 ASSAY THYROID STIM HORMONE: CPT | Mod: ,,, | Performed by: CLINICAL MEDICAL LABORATORY

## 2022-07-20 PROCEDURE — 80061 LIPID PANEL: ICD-10-PCS | Mod: ,,, | Performed by: CLINICAL MEDICAL LABORATORY

## 2022-07-20 PROCEDURE — 99215 PR OFFICE/OUTPT VISIT, EST, LEVL V, 40-54 MIN: ICD-10-PCS | Mod: ,,, | Performed by: FAMILY MEDICINE

## 2022-07-20 PROCEDURE — 85025 COMPLETE CBC W/AUTO DIFF WBC: CPT | Mod: ,,, | Performed by: CLINICAL MEDICAL LABORATORY

## 2022-07-20 PROCEDURE — 80053 COMPREHEN METABOLIC PANEL: CPT | Mod: ,,, | Performed by: CLINICAL MEDICAL LABORATORY

## 2022-07-20 PROCEDURE — 96372 THER/PROPH/DIAG INJ SC/IM: CPT | Mod: ,,, | Performed by: FAMILY MEDICINE

## 2022-07-20 PROCEDURE — 86140 C-REACTIVE PROTEIN: CPT | Mod: ,,, | Performed by: CLINICAL MEDICAL LABORATORY

## 2022-07-20 PROCEDURE — 80061 LIPID PANEL: CPT | Mod: ,,, | Performed by: CLINICAL MEDICAL LABORATORY

## 2022-07-20 PROCEDURE — 84443 TSH: ICD-10-PCS | Mod: ,,, | Performed by: CLINICAL MEDICAL LABORATORY

## 2022-07-20 PROCEDURE — 85025 CBC WITH DIFFERENTIAL: ICD-10-PCS | Mod: ,,, | Performed by: CLINICAL MEDICAL LABORATORY

## 2022-07-20 PROCEDURE — 96372 PR INJECTION,THERAP/PROPH/DIAG2ST, IM OR SUBCUT: ICD-10-PCS | Mod: ,,, | Performed by: FAMILY MEDICINE

## 2022-07-20 PROCEDURE — 84550 URIC ACID: ICD-10-PCS | Mod: ,,, | Performed by: CLINICAL MEDICAL LABORATORY

## 2022-07-20 PROCEDURE — 85651 SEDIMENTATION RATE, AUTOMATED: ICD-10-PCS | Mod: ,,, | Performed by: CLINICAL MEDICAL LABORATORY

## 2022-07-20 PROCEDURE — 84550 ASSAY OF BLOOD/URIC ACID: CPT | Mod: ,,, | Performed by: CLINICAL MEDICAL LABORATORY

## 2022-07-20 PROCEDURE — 83036 HEMOGLOBIN A1C: ICD-10-PCS | Mod: ,,, | Performed by: CLINICAL MEDICAL LABORATORY

## 2022-07-20 PROCEDURE — 80053 COMPREHENSIVE METABOLIC PANEL: ICD-10-PCS | Mod: ,,, | Performed by: CLINICAL MEDICAL LABORATORY

## 2022-07-20 PROCEDURE — 99215 OFFICE O/P EST HI 40 MIN: CPT | Mod: ,,, | Performed by: FAMILY MEDICINE

## 2022-07-20 PROCEDURE — 83036 HEMOGLOBIN GLYCOSYLATED A1C: CPT | Mod: ,,, | Performed by: CLINICAL MEDICAL LABORATORY

## 2022-07-20 PROCEDURE — 84436 ASSAY OF TOTAL THYROXINE: CPT | Mod: ,,, | Performed by: CLINICAL MEDICAL LABORATORY

## 2022-07-20 RX ORDER — LOSARTAN POTASSIUM 100 MG/1
100 TABLET ORAL DAILY
Qty: 90 TABLET | Refills: 1 | Status: SHIPPED | OUTPATIENT
Start: 2022-07-20 | End: 2022-12-12 | Stop reason: SDUPTHER

## 2022-07-20 RX ORDER — FUROSEMIDE 20 MG/1
20 TABLET ORAL DAILY
Qty: 90 TABLET | Refills: 1 | Status: SHIPPED | OUTPATIENT
Start: 2022-07-20 | End: 2022-12-12 | Stop reason: SDUPTHER

## 2022-07-20 RX ORDER — ALBUTEROL SULFATE 90 UG/1
1-2 AEROSOL, METERED RESPIRATORY (INHALATION) EVERY 4 HOURS PRN
Qty: 18 G | Refills: 5 | Status: SHIPPED | OUTPATIENT
Start: 2022-07-20 | End: 2022-12-12 | Stop reason: SDUPTHER

## 2022-07-20 RX ORDER — OMEPRAZOLE 20 MG/1
20 CAPSULE, DELAYED RELEASE ORAL DAILY
Qty: 90 CAPSULE | Refills: 1 | Status: SHIPPED | OUTPATIENT
Start: 2022-07-20 | End: 2022-09-12 | Stop reason: SDUPTHER

## 2022-07-20 RX ORDER — PRAMIPEXOLE DIHYDROCHLORIDE 0.12 MG/1
0.25 TABLET ORAL NIGHTLY
Qty: 180 TABLET | Refills: 1 | Status: SHIPPED | OUTPATIENT
Start: 2022-07-20 | End: 2022-12-12 | Stop reason: SDUPTHER

## 2022-07-20 RX ORDER — TIZANIDINE 4 MG/1
4 TABLET ORAL 2 TIMES DAILY PRN
Qty: 60 TABLET | Refills: 5 | Status: SHIPPED | OUTPATIENT
Start: 2022-07-20 | End: 2022-12-12 | Stop reason: SDUPTHER

## 2022-07-20 RX ORDER — PROPRANOLOL HYDROCHLORIDE 40 MG/1
40 TABLET ORAL 2 TIMES DAILY
Qty: 180 TABLET | Refills: 1 | Status: SHIPPED | OUTPATIENT
Start: 2022-07-20 | End: 2022-12-12 | Stop reason: SDUPTHER

## 2022-07-20 RX ORDER — AMLODIPINE BESYLATE 5 MG/1
5 TABLET ORAL 2 TIMES DAILY
Qty: 180 TABLET | Refills: 1 | Status: SHIPPED | OUTPATIENT
Start: 2022-07-20 | End: 2022-12-12 | Stop reason: SDUPTHER

## 2022-07-20 RX ORDER — ATORVASTATIN CALCIUM 40 MG/1
40 TABLET, FILM COATED ORAL DAILY
Qty: 90 TABLET | Refills: 1 | Status: SHIPPED | OUTPATIENT
Start: 2022-07-20 | End: 2022-12-12 | Stop reason: SDUPTHER

## 2022-07-20 RX ORDER — DESVENLAFAXINE 100 MG/1
100 TABLET, EXTENDED RELEASE ORAL DAILY
Qty: 90 TABLET | Refills: 1 | Status: SHIPPED | OUTPATIENT
Start: 2022-07-20 | End: 2022-09-12 | Stop reason: SDUPTHER

## 2022-07-20 RX ORDER — IPRATROPIUM BROMIDE 42 UG/1
2 SPRAY, METERED NASAL 4 TIMES DAILY
Qty: 15 ML | Refills: 5 | Status: SHIPPED | OUTPATIENT
Start: 2022-07-20 | End: 2022-12-15 | Stop reason: SDUPTHER

## 2022-07-20 RX ORDER — METHYLPREDNISOLONE ACETATE 40 MG/ML
40 INJECTION, SUSPENSION INTRA-ARTICULAR; INTRALESIONAL; INTRAMUSCULAR; SOFT TISSUE
Status: COMPLETED | OUTPATIENT
Start: 2022-07-20 | End: 2022-07-20

## 2022-07-20 RX ORDER — PRIMIDONE 50 MG/1
TABLET ORAL
Qty: 450 TABLET | Refills: 1 | Status: SHIPPED | OUTPATIENT
Start: 2022-07-20 | End: 2022-09-12 | Stop reason: SDUPTHER

## 2022-07-20 RX ORDER — CELECOXIB 200 MG/1
200 CAPSULE ORAL DAILY
Qty: 90 CAPSULE | Refills: 1 | Status: SHIPPED | OUTPATIENT
Start: 2022-07-20 | End: 2022-12-12 | Stop reason: SDUPTHER

## 2022-07-20 RX ADMIN — METHYLPREDNISOLONE ACETATE 40 MG: 40 INJECTION, SUSPENSION INTRA-ARTICULAR; INTRALESIONAL; INTRAMUSCULAR; SOFT TISSUE at 11:07

## 2022-07-24 PROBLEM — J44.9 CHRONIC OBSTRUCTIVE PULMONARY DISEASE: Status: ACTIVE | Noted: 2022-07-24

## 2022-07-25 NOTE — ASSESSMENT & PLAN NOTE
Chronic serous otitis.  Will start her on Atrovent nasal spray.  She is not improved at her next visit in 1 month we will send her for ear nose and throat evaluation.

## 2022-07-25 NOTE — ASSESSMENT & PLAN NOTE
Chronic serous otitis right ear.  Will start her on some Atrovent nasal spray.  Follow-up on a p.r.n. basis.

## 2022-07-25 NOTE — ASSESSMENT & PLAN NOTE
COPD currently well controlled.  Will maintain her on inhalers.  She is to have her immunizations in September include flu and if she will take COVID vaccine.  Follow-up in 3 months

## 2022-07-25 NOTE — PROGRESS NOTES
Dominic Montesinos DO   [address]  [place]  [address]      PATIENT NAME: Felicia Keita  : 1945  DATE: 22  MRN: [id number]      Billing Provider: Dominic Montesinos DO  Level of Service:   Patient PCP Information     Provider PCP Type    Dominic Montesinos DO General          Reason for Visit / Chief Complaint: Hypertension, Hyperlipidemia, Hip Pain (Chronic right hip pain- requesting injection.), and Joint Swelling (Left ankle swelling x 2 weeks)       Update PCP  Update Chief Complaint         History of Present Illness / Problem Focused Workflow     Felicia Keita presents to the clinic with Hypertension, Hyperlipidemia, Hip Pain (Chronic right hip pain- requesting injection.), and Joint Swelling (Left ankle swelling x 2 weeks)     Patient complaining of hip pain as well as ankle pain and some swelling.  She denies any fever chills or sweats.  She does have a history of arthritis.  She also has history of hypertension and hyperlipidemia.  She denies any chest pain shortness of breath palpitations PND or orthopnea.  Patient does have a history of DVTs but no PEs.  She does report the swelling and pain in her primary ankles and legs.  She denies any recent increasing chest pain or palpitations.    Hypertension  Associated symptoms include shortness of breath. Pertinent negatives include no chest pain, headaches, neck pain or palpitations.   Hyperlipidemia  Associated symptoms include shortness of breath. Pertinent negatives include no chest pain, leg pain or myalgias.   Hip Pain   Pertinent negatives include no numbness.       Review of Systems     Review of Systems   Constitutional: Negative for activity change, appetite change, chills, fatigue and fever.   HENT: Negative for nasal congestion, ear discharge, ear pain, mouth dryness, mouth sores, postnasal drip, sinus pressure/congestion, sore throat and voice change.    Eyes: Negative for pain, discharge, redness, itching and  visual disturbance.   Respiratory: Positive for shortness of breath. Negative for apnea, cough, chest tightness and wheezing.    Cardiovascular: Positive for leg swelling. Negative for chest pain and palpitations.   Gastrointestinal: Negative for abdominal distention, abdominal pain, anal bleeding, blood in stool, change in bowel habit, constipation, diarrhea, nausea, vomiting, reflux and change in bowel habit.   Endocrine: Negative for cold intolerance, heat intolerance, polydipsia, polyphagia and polyuria.   Genitourinary: Negative for difficulty urinating, enuresis, frequency, genital sores, hematuria, hot flashes, menstrual irregularity, urgency and vaginal dryness.   Musculoskeletal: Positive for arthralgias and back pain. Negative for gait problem, leg pain, myalgias and neck pain.   Integumentary:  Negative for rash, mole/lesion, breast mass, breast discharge and breast tenderness.   Allergic/Immunologic: Negative for environmental allergies and food allergies.   Neurological: Negative for dizziness, vertigo, tremors, seizures, syncope, facial asymmetry, speech difficulty, weakness, light-headedness, numbness, headaches, disturbances in coordination, memory loss and coordination difficulties.   Hematological: Negative for adenopathy. Does not bruise/bleed easily.   Psychiatric/Behavioral: Negative for agitation, behavioral problems, confusion, decreased concentration, dysphoric mood, hallucinations, self-injury, sleep disturbance and suicidal ideas. The patient is not nervous/anxious and is not hyperactive.    Breast: Negative for mass and tenderness      Medical / Social / Family History     Past Medical History:   Diagnosis Date    Abnormal gait 05/22/2013    Actinic keratosis 08/12/2020    L forearm     Allergic rhinitis 04/07/2020    Blepharophimosis 04/22/2014    senile    Cervical somatic dysfunction 08/15/2017    Cervicalgia 03/28/2019    Chronic peripheral venous hypertension 06/16/2015     Chronic serous otitis media, bilateral 09/05/2019    Chronic venous hypertension (idiopathic) without complications of unspecified lower extremity 08/15/2017    Conjunctival hemorrhage, right eye 11/02/2020    Deep venous thrombosis of lower extremity 09/17/2012    Degeneration of cervical intervertebral disc 10/21/2011    Degeneration of lumbar intervertebral disc 10/21/2011    Degenerative joint disease involving multiple joints 07/25/2011    Depressive disorder 07/25/2011    Essential hypertension 08/12/2020    Essential tremor 06/24/2014    GERD without esophagitis 08/15/2017    Headache 04/07/2020    Hyperlipidemia 05/08/2012    Insomnia 04/06/2016    Osteoarthritis 01/04/2017    Osteoporosis 11/07/2017    Otalgia, right ear 10/17/2016    Other cervical disc degeneration, unspecified cervical region 08/15/2017    Overflow incontinence 01/06/2020    Pain in right knee 01/06/2020    osteoarthritis    Pain in right leg 09/10/2020    Peripheral arterial occlusive disease 02/24/2015    Peripheral vascular disease, unspecified 08/06/2019    Peripheral venous insufficiency 09/29/2015    Personal history of PE (pulmonary embolism) 08/06/2019    Presence of vena cava filter 03/2015    Pulmonary embolus 03/10/2015    Pulmonary infarction 03/16/2012    Pure hypercholesterolemia 07/25/2011    Restless legs 05/22/2018    Right temporomandibular joint disorder, unspecified 08/18/2020    Rosacea 07/01/2015    Sciatica, right side 07/06/2016    Seborrheic keratosis 01/07/2021    Segmental and somatic dysfunction of thoracic region 03/28/2019    Senile osteoporosis 04/24/2017    Spasm of back muscles 07/25/2011    Trochanteric bursitis of right hip 01/06/2020    Unspecified urinary incontinence 02/07/2020    Vitamin D deficiency 10/27/2014       Past Surgical History:   Procedure Laterality Date    APPENDECTOMY      bone density  06/27/2019    Ajith/Abiel    CARPAL TUNNEL RELEASE       BLI    LUMBAR LAMINECTOMY      prolia  03/12/2019    1mg    remove cataract Right     insert lens    TONSILLECTOMY      TOTAL ABDOMINAL HYSTERECTOMY      VAGINAL DELIVERY      vascular surgery procedure       Right SSV Laser Ablation performed by Dr. Carlo hernandez screen  05/24/2018       Social History  Ms.  reports that she has never smoked. She has never used smokeless tobacco. She reports that she does not drink alcohol and does not use drugs.    Family History  Ms.'s family history includes COPD in her daughter and sister; Cancer in her brother; Emphysema in her father; Hearing loss in her father; Hypertension in her father and sister; Melanoma in her brother; Rheum arthritis in her daughter and sister; Skin cancer in her father; Stomach cancer in her sister.    Medications and Allergies     Medications  Outpatient Medications Marked as Taking for the 7/20/22 encounter (Office Visit) with Dominic Montesinos, DO   Medication Sig Dispense Refill    ascorbic acid, vitamin C, (VITAMIN C) 500 MG tablet Take 500 mg by mouth once daily.      calcium-vitamin D tablet 600 mg-200 units Take 1 tablet by mouth 2 (two) times a day.      denosumab (PROLIA) 60 mg/mL Syrg Inject 60 mg into the skin every 6 (six) months.      diclofenac sodium (VOLTAREN) 1 % Gel Apply 4 grams to large joints 4 times daily 450 g 0    gabapentin (NEURONTIN) 300 MG capsule TAKE 1 CAPSULE BY MOUTH IN THE MORNING, TAKE 1 CAPSULE at LUNCH, AND TAKE 2 CAPSULES AT BEDTIME      HYDROcodone-acetaminophen (NORCO)  mg per tablet 05/27/21 TAKE 1 TABLET BY MOUTH EVERY 8 HOURS AS NEEDED      neomycin-polymyxin-hydrocortisone (CORTISPORIN) otic solution Place 2 drops into both ears every 6 (six) hours.      zinc gluconate 50 mg tablet Take 50 mg by mouth once daily.      [DISCONTINUED] albuterol (PROVENTIL/VENTOLIN HFA) 90 mcg/actuation inhaler Inhale 1-2 puffs into the lungs every 4 (four) hours as needed for Wheezing. 18 g 0     [DISCONTINUED] amLODIPine (NORVASC) 5 MG tablet Take 1 tablet (5 mg total) by mouth 2 (two) times daily. 180 tablet 1    [DISCONTINUED] atorvastatin (LIPITOR) 40 MG tablet Take 1 tablet (40 mg total) by mouth once daily. 30 tablet 0    [DISCONTINUED] celecoxib (CELEBREX) 200 MG capsule Take 1 capsule (200 mg total) by mouth once daily. 90 capsule 1    [DISCONTINUED] desvenlafaxine succinate (PRISTIQ) 100 MG Tb24 Take 1 tablet (100 mg total) by mouth once daily. 90 tablet 1    [DISCONTINUED] furosemide (LASIX) 20 MG tablet Take 1 tablet (20 mg total) by mouth once daily. 90 tablet 1    [DISCONTINUED] ipratropium (ATROVENT) 42 mcg (0.06 %) nasal spray 2 sprays by Nasal route 3 (three) times daily. 45 mL 3    [DISCONTINUED] ipratropium (ATROVENT) 42 mcg (0.06 %) nasal spray 2 sprays by Nasal route 4 (four) times daily. 15 mL 3    [DISCONTINUED] losartan (COZAAR) 100 MG tablet Take 1 tablet (100 mg total) by mouth once daily. 30 tablet 0    [DISCONTINUED] omeprazole (PRILOSEC) 20 MG capsule Take 1 capsule (20 mg total) by mouth once daily. 90 capsule 1    [DISCONTINUED] pramipexole (MIRAPEX) 0.125 MG tablet Take 2 tablets (0.25 mg total) by mouth nightly. 60 tablet 5    [DISCONTINUED] primidone (MYSOLINE) 50 MG Tab TAKE 2 TABLETS BY MOUTH EVERY MORNING AND TAKE 3 TABLETS BY MOUTH EVERY EVENING 450 tablet 1    [DISCONTINUED] propranoloL (INDERAL) 40 MG tablet Take 1 tablet (40 mg total) by mouth 2 (two) times daily. 60 tablet 0    [DISCONTINUED] tiZANidine (ZANAFLEX) 4 MG tablet Take 1 tablet (4 mg total) by mouth 2 (two) times daily as needed. 30 tablet 3    [DISCONTINUED] XARELTO 20 mg Tab Take 1 tablet (20 mg total) by mouth once daily. 30 tablet 0       Allergies  Review of patient's allergies indicates:  No Known Allergies    Physical Examination     Vitals:    07/20/22 1010   BP: 132/74   Pulse: 64   Resp: (!) 22     Physical Exam  Vitals reviewed.   Constitutional:       General: She is not in acute  distress.     Appearance: Normal appearance. She is obese.   HENT:      Head: Normocephalic and atraumatic.      Nose: Nose normal.      Mouth/Throat:      Mouth: Mucous membranes are moist.      Pharynx: Oropharynx is clear. No oropharyngeal exudate or posterior oropharyngeal erythema.   Eyes:      General: No scleral icterus.     Extraocular Movements: Extraocular movements intact.      Conjunctiva/sclera: Conjunctivae normal.      Pupils: Pupils are equal, round, and reactive to light.   Neck:      Vascular: No carotid bruit.   Cardiovascular:      Rate and Rhythm: Normal rate and regular rhythm.      Pulses: Normal pulses.      Heart sounds: Normal heart sounds. No murmur heard.    No friction rub. No gallop.   Pulmonary:      Effort: Pulmonary effort is normal.      Breath sounds: Normal breath sounds. No wheezing.   Abdominal:      General: Abdomen is flat. Bowel sounds are normal.      Palpations: Abdomen is soft.      Tenderness: There is no abdominal tenderness.   Musculoskeletal:         General: Tenderness present. Normal range of motion.      Cervical back: Normal range of motion and neck supple.      Right lower leg: Edema present.      Left lower leg: Edema present.      Comments: Tenderness in both ankles but no increased heat noted.  Neurovascular is intact   Lymphadenopathy:      Cervical: No cervical adenopathy.   Skin:     General: Skin is warm and dry.      Capillary Refill: Capillary refill takes less than 2 seconds.      Coloration: Skin is not jaundiced.      Findings: No lesion.   Neurological:      General: No focal deficit present.      Mental Status: She is alert and oriented to person, place, and time. Mental status is at baseline.      Cranial Nerves: No cranial nerve deficit.      Sensory: No sensory deficit.      Motor: No weakness.      Coordination: Coordination normal.      Gait: Gait normal.      Deep Tendon Reflexes: Reflexes normal.   Psychiatric:         Mood and Affect: Mood  normal.         Behavior: Behavior normal.         Thought Content: Thought content normal.         Judgment: Judgment normal.               Lab Results   Component Value Date    WBC 5.86 07/20/2022    HGB 13.3 07/20/2022    HCT 40.8 07/20/2022    MCV 88.1 07/20/2022     07/20/2022          Sodium   Date Value Ref Range Status   07/20/2022 132 (L) 136 - 145 mmol/L Final     Potassium   Date Value Ref Range Status   07/20/2022 5.3 (H) 3.5 - 5.1 mmol/L Final     Chloride   Date Value Ref Range Status   07/20/2022 98 98 - 107 mmol/L Final     CO2   Date Value Ref Range Status   07/20/2022 29 21 - 32 mmol/L Final     Glucose   Date Value Ref Range Status   07/20/2022 91 74 - 106 mg/dL Final     BUN   Date Value Ref Range Status   07/20/2022 9 7 - 18 mg/dL Final     Creatinine   Date Value Ref Range Status   07/20/2022 0.92 0.55 - 1.02 mg/dL Final     Calcium   Date Value Ref Range Status   07/20/2022 9.2 8.5 - 10.1 mg/dL Final     Total Protein   Date Value Ref Range Status   07/20/2022 7.0 6.4 - 8.2 g/dL Final     Albumin   Date Value Ref Range Status   07/20/2022 3.8 3.5 - 5.0 g/dL Final     Bilirubin, Total   Date Value Ref Range Status   07/20/2022 0.4 0.0 - 1.2 mg/dL Final     Alk Phos   Date Value Ref Range Status   07/20/2022 94 55 - 142 U/L Final     AST   Date Value Ref Range Status   07/20/2022 23 15 - 37 U/L Final     ALT   Date Value Ref Range Status   07/20/2022 24 13 - 56 U/L Final     Anion Gap   Date Value Ref Range Status   07/20/2022 10 7 - 16 mmol/L Final     eGFR   Date Value Ref Range Status   07/20/2022 63 >=60 mL/min/1.73m² Final      DXA Bone Density Spine And Hip  Narrative: EXAMINATION:  DEXA BONE DENSITY SPINE HIP    CLINICAL HISTORY:  Age-related osteoporosis without current pathological fracture    COMPARISON:  None    FINDINGS:  No significant imaging findings.    L1-L4 spine:    BMD: 0.982 g/cm^2    T-score: -0.6    Z-score: 1.9    Left femur/hip:    BMD: 0.561 g/cm^2    T-score:  -2.6    Z-score: -0.4  Impression: T score of -2.6 obtained from the left femoral neck which is considered in the range of osteoporosis.    Recommendations:    1. Encourage adequate intake of calcium and vitamin D if clinically appropriate.    2. Consider regular weight-bearing and muscle strengthening exercises if clinically appropriate.    3. Consider hormone replacement therapy if clinically appropriate.    4. Consider antiresorptive therapy if clinically appropriate.    5. Consider followup BMD every 1-2 years if clinically appropriate.    6. Advise patient to avoid tobacco smoking and to not over use alcohol.    Place of service: [facility].    Electronically signed by: Mack Godoy  Date:    07/01/2021  Time:    11:46     Procedures   Assessment and Plan (including Health Maintenance)      Problem List  Smart Sets  Document Outside HM   :    Plan:         Health Maintenance Due   Topic Date Due    Hepatitis C Screening  Never done    COVID-19 Vaccine (1) Never done    TETANUS VACCINE  Never done    Shingles Vaccine (1 of 2) Never done       Problem List Items Addressed This Visit        Neuro    Restless legs    Current Assessment & Plan     Restless legs is controlled with gabapentin.  No change in medicines           Relevant Medications    primidone (MYSOLINE) 50 MG Tab    pramipexole (MIRAPEX) 0.125 MG tablet       Psychiatric    Depression    Current Assessment & Plan     Major depression currently well controlled with Pristiq.  No change in medicine.  Follow-up in 3 months.           Relevant Medications    desvenlafaxine succinate (PRISTIQ) 100 MG Tb24       ENT    Bilateral chronic serous otitis media    Current Assessment & Plan     Chronic serous otitis.  Will start her on Atrovent nasal spray.  She is not improved at her next visit in 1 month we will send her for ear nose and throat evaluation.           Relevant Medications    ipratropium (ATROVENT) 42 mcg (0.06 %) nasal spray    Otalgia,  right ear    Current Assessment & Plan     Chronic serous otitis right ear.  Will start her on some Atrovent nasal spray.  Follow-up on a p.r.n. basis.              Pulmonary    Chronic obstructive pulmonary disease    Current Assessment & Plan     COPD currently well controlled.  Will maintain her on inhalers.  She is to have her immunizations in September include flu and if she will take COVID vaccine.  Follow-up in 3 months           Relevant Medications    albuterol (PROVENTIL/VENTOLIN HFA) 90 mcg/actuation inhaler       Cardiac/Vascular    Essential hypertension    Current Assessment & Plan     Blood pressure is well controlled.  No change in medicines.  Follow-up in 3 months.           Relevant Medications    losartan (COZAAR) 100 MG tablet    propranoloL (INDERAL) 40 MG tablet    Other Relevant Orders    CBC Auto Differential (Completed)    Comprehensive Metabolic Panel (Completed)    T4 (Completed)    TSH (Completed)    Hyperlipidemia    Current Assessment & Plan     No recent lipid panels done.  Will check a lipid panel today.  No change in medicines.  Follow-up every 6 months.           Relevant Medications    atorvastatin (LIPITOR) 40 MG tablet    Other Relevant Orders    Lipid Panel (Completed)       GI    GERD without esophagitis    Current Assessment & Plan     Her GERD is currently stable no complaints of his indigestion or heartburn.  Will maintain her current medicines.  Follow-up in 3 months           Relevant Medications    omeprazole (PRILOSEC) 20 MG capsule       Orthopedic    Back pain    Current Assessment & Plan     Back pain secondary to degenerative disc disease.  We will treat today with the Depo-Medrol as well as continue her tizanidine.           Relevant Medications    tiZANidine (ZANAFLEX) 4 MG tablet    Osteoarthritis    Current Assessment & Plan     Osteoarthritis with symptoms of back pain as well as knee pain.  Will give her Depo-Medrol 40 IM today.  No change in her tizanidine.   Recommended Aspercreme with lidocaine or Voltaren gel.           Relevant Medications    ipratropium (ATROVENT) 42 mcg (0.06 %) nasal spray    celecoxib (CELEBREX) 200 MG capsule    Other Relevant Orders    Sedimentation Rate (Completed)    C-Reactive Protein (Completed)    Uric Acid (Completed)       Other    Edema    Current Assessment & Plan     Edema currently controlled with Lasix.  No change in medicines.  Will check his CMP on her today.           Relevant Medications    furosemide (LASIX) 20 MG tablet      Other Visit Diagnoses     Hyperglycemia    -  Primary    Relevant Orders    Hemoglobin A1C (Completed)    Hypertension, unspecified type        Relevant Medications    amLODIPine (NORVASC) 5 MG tablet    Other Relevant Orders    CBC Auto Differential (Completed)    Comprehensive Metabolic Panel (Completed)    T4 (Completed)    TSH (Completed)    Chronic deep vein thrombosis (DVT) of other vein of left lower extremity        Relevant Medications    rivaroxaban (XARELTO) 20 mg Tab          Health Maintenance Topics with due status: Not Due       Topic Last Completion Date    DEXA Scan 07/01/2021    Influenza Vaccine 10/21/2021    Lipid Panel 07/20/2022       Future Appointments   Date Time Provider Department Center   10/13/2022  9:30 AM Dominic Montesinos DO [facility]   10/20/2022  2:00 PM AWV NURSE [facility]   11/29/2022 10:00 AM INFUSION, ALEJANDRO [facility]        Follow up in about 3 months (around 10/20/2022).     Signature:  DO [facility]  [address] [place] [address], [place]  [id number]    Date of encounter: 7/20/22

## 2022-07-25 NOTE — ASSESSMENT & PLAN NOTE
Osteoarthritis with symptoms of back pain as well as knee pain.  Will give her Depo-Medrol 40 IM today.  No change in her tizanidine.  Recommended Aspercreme with lidocaine or Voltaren gel.

## 2022-07-25 NOTE — ASSESSMENT & PLAN NOTE
Back pain secondary to degenerative disc disease.  We will treat today with the Depo-Medrol as well as continue her tizanidine.

## 2022-07-25 NOTE — ASSESSMENT & PLAN NOTE
No recent lipid panels done.  Will check a lipid panel today.  No change in medicines.  Follow-up every 6 months.

## 2022-07-25 NOTE — ASSESSMENT & PLAN NOTE
Major depression currently well controlled with Pristiq.  No change in medicine.  Follow-up in 3 months.

## 2022-07-25 NOTE — ASSESSMENT & PLAN NOTE
Her GERD is currently stable no complaints of his indigestion or heartburn.  Will maintain her current medicines.  Follow-up in 3 months

## 2022-07-27 DIAGNOSIS — M16.11 PRIMARY OSTEOARTHRITIS OF RIGHT HIP: ICD-10-CM

## 2022-07-27 RX ORDER — DICLOFENAC SODIUM 10 MG/G
GEL TOPICAL
Qty: 450 G | Refills: 1 | Status: SHIPPED | OUTPATIENT
Start: 2022-07-27 | End: 2022-10-24 | Stop reason: SDUPTHER

## 2022-09-12 ENCOUNTER — OFFICE VISIT (OUTPATIENT)
Dept: FAMILY MEDICINE | Facility: CLINIC | Age: 77
End: 2022-09-12
Payer: MEDICARE

## 2022-09-12 VITALS
RESPIRATION RATE: 18 BRPM | DIASTOLIC BLOOD PRESSURE: 74 MMHG | HEART RATE: 57 BPM | TEMPERATURE: 99 F | SYSTOLIC BLOOD PRESSURE: 130 MMHG | HEIGHT: 67 IN | BODY MASS INDEX: 31.05 KG/M2 | WEIGHT: 197.81 LBS | OXYGEN SATURATION: 97 %

## 2022-09-12 DIAGNOSIS — K21.9 GERD WITHOUT ESOPHAGITIS: ICD-10-CM

## 2022-09-12 DIAGNOSIS — E87.1 HYPONATREMIA: Primary | ICD-10-CM

## 2022-09-12 DIAGNOSIS — J18.9 PNEUMONIA DUE TO INFECTIOUS ORGANISM, UNSPECIFIED LATERALITY, UNSPECIFIED PART OF LUNG: ICD-10-CM

## 2022-09-12 DIAGNOSIS — G89.29 CHRONIC BILATERAL LOW BACK PAIN WITH RIGHT-SIDED SCIATICA: ICD-10-CM

## 2022-09-12 DIAGNOSIS — G25.81 RESTLESS LEGS: ICD-10-CM

## 2022-09-12 DIAGNOSIS — M54.41 CHRONIC BILATERAL LOW BACK PAIN WITH RIGHT-SIDED SCIATICA: ICD-10-CM

## 2022-09-12 DIAGNOSIS — M54.50 CHRONIC BILATERAL LOW BACK PAIN WITHOUT SCIATICA: ICD-10-CM

## 2022-09-12 DIAGNOSIS — F33.0 MILD EPISODE OF RECURRENT MAJOR DEPRESSIVE DISORDER: ICD-10-CM

## 2022-09-12 DIAGNOSIS — G89.29 CHRONIC BILATERAL LOW BACK PAIN WITHOUT SCIATICA: ICD-10-CM

## 2022-09-12 LAB
ANION GAP SERPL CALCULATED.3IONS-SCNC: 9 MMOL/L (ref 7–16)
BUN SERPL-MCNC: 13 MG/DL (ref 7–18)
BUN/CREAT SERPL: 14 (ref 6–20)
CALCIUM SERPL-MCNC: 9.5 MG/DL (ref 8.5–10.1)
CHLORIDE SERPL-SCNC: 96 MMOL/L (ref 98–107)
CO2 SERPL-SCNC: 30 MMOL/L (ref 21–32)
CREAT SERPL-MCNC: 0.92 MG/DL (ref 0.55–1.02)
EGFR (NO RACE VARIABLE) (RUSH/TITUS): 64 ML/MIN/1.73M²
GLUCOSE SERPL-MCNC: 93 MG/DL (ref 74–106)
POTASSIUM SERPL-SCNC: 4.4 MMOL/L (ref 3.5–5.1)
SODIUM SERPL-SCNC: 131 MMOL/L (ref 136–145)

## 2022-09-12 PROCEDURE — 3075F PR MOST RECENT SYSTOLIC BLOOD PRESS GE 130-139MM HG: ICD-10-PCS | Mod: ,,, | Performed by: NURSE PRACTITIONER

## 2022-09-12 PROCEDURE — 1125F AMNT PAIN NOTED PAIN PRSNT: CPT | Mod: ,,, | Performed by: NURSE PRACTITIONER

## 2022-09-12 PROCEDURE — 96372 PR INJECTION,THERAP/PROPH/DIAG2ST, IM OR SUBCUT: ICD-10-PCS | Mod: ,,, | Performed by: NURSE PRACTITIONER

## 2022-09-12 PROCEDURE — 1159F PR MEDICATION LIST DOCUMENTED IN MEDICAL RECORD: ICD-10-PCS | Mod: ,,, | Performed by: NURSE PRACTITIONER

## 2022-09-12 PROCEDURE — 96372 THER/PROPH/DIAG INJ SC/IM: CPT | Mod: ,,, | Performed by: NURSE PRACTITIONER

## 2022-09-12 PROCEDURE — 3078F DIAST BP <80 MM HG: CPT | Mod: ,,, | Performed by: NURSE PRACTITIONER

## 2022-09-12 PROCEDURE — 3078F PR MOST RECENT DIASTOLIC BLOOD PRESSURE < 80 MM HG: ICD-10-PCS | Mod: ,,, | Performed by: NURSE PRACTITIONER

## 2022-09-12 PROCEDURE — 99214 PR OFFICE/OUTPT VISIT, EST, LEVL IV, 30-39 MIN: ICD-10-PCS | Mod: 25,,, | Performed by: NURSE PRACTITIONER

## 2022-09-12 PROCEDURE — 99214 OFFICE O/P EST MOD 30 MIN: CPT | Mod: 25,,, | Performed by: NURSE PRACTITIONER

## 2022-09-12 PROCEDURE — 1159F MED LIST DOCD IN RCRD: CPT | Mod: ,,, | Performed by: NURSE PRACTITIONER

## 2022-09-12 PROCEDURE — 3075F SYST BP GE 130 - 139MM HG: CPT | Mod: ,,, | Performed by: NURSE PRACTITIONER

## 2022-09-12 PROCEDURE — 80048 BASIC METABOLIC PNL TOTAL CA: CPT | Mod: ICN,,, | Performed by: CLINICAL MEDICAL LABORATORY

## 2022-09-12 PROCEDURE — 80048 BASIC METABOLIC PANEL: ICD-10-PCS | Mod: ICN,,, | Performed by: CLINICAL MEDICAL LABORATORY

## 2022-09-12 PROCEDURE — 1125F PR PAIN SEVERITY QUANTIFIED, PAIN PRESENT: ICD-10-PCS | Mod: ,,, | Performed by: NURSE PRACTITIONER

## 2022-09-12 RX ORDER — METHYLPREDNISOLONE ACETATE 40 MG/ML
40 INJECTION, SUSPENSION INTRA-ARTICULAR; INTRALESIONAL; INTRAMUSCULAR; SOFT TISSUE
Status: COMPLETED | OUTPATIENT
Start: 2022-09-12 | End: 2022-09-12

## 2022-09-12 RX ORDER — OMEPRAZOLE 20 MG/1
20 CAPSULE, DELAYED RELEASE ORAL DAILY
Qty: 90 CAPSULE | Refills: 1 | Status: SHIPPED | OUTPATIENT
Start: 2022-09-12 | End: 2022-12-12 | Stop reason: SDUPTHER

## 2022-09-12 RX ORDER — KETOROLAC TROMETHAMINE 10 MG/1
10 TABLET, FILM COATED ORAL EVERY 6 HOURS
Qty: 20 TABLET | Refills: 0 | Status: SHIPPED | OUTPATIENT
Start: 2022-09-12 | End: 2022-09-12

## 2022-09-12 RX ORDER — KETOROLAC TROMETHAMINE 30 MG/ML
60 INJECTION, SOLUTION INTRAMUSCULAR; INTRAVENOUS
Status: COMPLETED | OUTPATIENT
Start: 2022-09-12 | End: 2022-09-12

## 2022-09-12 RX ORDER — DEXAMETHASONE SODIUM PHOSPHATE 4 MG/ML
4 INJECTION, SOLUTION INTRA-ARTICULAR; INTRALESIONAL; INTRAMUSCULAR; INTRAVENOUS; SOFT TISSUE
Status: COMPLETED | OUTPATIENT
Start: 2022-09-12 | End: 2022-09-12

## 2022-09-12 RX ORDER — PRIMIDONE 50 MG/1
TABLET ORAL
Qty: 450 TABLET | Refills: 1 | Status: SHIPPED | OUTPATIENT
Start: 2022-09-12 | End: 2022-12-12 | Stop reason: SDUPTHER

## 2022-09-12 RX ORDER — DESVENLAFAXINE 100 MG/1
100 TABLET, EXTENDED RELEASE ORAL DAILY
Qty: 90 TABLET | Refills: 1 | Status: SHIPPED | OUTPATIENT
Start: 2022-09-12 | End: 2022-12-12 | Stop reason: SDUPTHER

## 2022-09-12 RX ORDER — PREDNISONE 10 MG/1
TABLET ORAL
Qty: 21 TABLET | Refills: 1 | Status: SHIPPED | OUTPATIENT
Start: 2022-09-12 | End: 2022-12-12 | Stop reason: ALTCHOICE

## 2022-09-12 RX ADMIN — DEXAMETHASONE SODIUM PHOSPHATE 4 MG: 4 INJECTION, SOLUTION INTRA-ARTICULAR; INTRALESIONAL; INTRAMUSCULAR; INTRAVENOUS; SOFT TISSUE at 09:09

## 2022-09-12 RX ADMIN — KETOROLAC TROMETHAMINE 60 MG: 30 INJECTION, SOLUTION INTRAMUSCULAR; INTRAVENOUS at 09:09

## 2022-09-12 RX ADMIN — METHYLPREDNISOLONE ACETATE 40 MG: 40 INJECTION, SUSPENSION INTRA-ARTICULAR; INTRALESIONAL; INTRAMUSCULAR; SOFT TISSUE at 09:09

## 2022-09-12 NOTE — PROGRESS NOTES
Silva Peters NP   [facility]  [address] [address] [place]     PATIENT NAME: Felicia Keita  : 1945  DATE: 22  MRN: [id number]      Billing Provider: Silva Peters NP  Level of Service: NC OFFICE/OUTPT VISIT, EST, LEVL IV, 30-39 MIN  Patient PCP Information       Provider PCP Type    Silva Peters NP General            Reason for Visit / Chief Complaint: Establish Care (Former Dr. Montesinos pt. /Labs done 22) and Back Pain (CHRONIC)       Update PCP  Update Chief Complaint         History of Present Illness / Problem Focused Workflow     Felicia Keita presents to the clinic here to ext care, c/o chronic back pain, rates pain 8, also stated that she is getting over pneumonia,see pt hx.       Review of Systems     Review of Systems   Constitutional:  Negative for chills, fatigue and fever.   HENT:  Negative for nasal congestion, ear pain, facial swelling, hearing loss, mouth dryness, mouth sores, postnasal drip, rhinorrhea, sinus pressure/congestion and goiter.    Eyes:  Negative for discharge and itching.   Respiratory:  Negative for cough, shortness of breath and wheezing.    Cardiovascular:  Negative for chest pain and leg swelling.   Gastrointestinal:  Negative for abdominal pain, change in bowel habit and change in bowel habit.   Genitourinary:  Negative for difficulty urinating, dysuria, enuresis, frequency, hematuria and urgency.   Musculoskeletal:  Positive for back pain.   Neurological:  Negative for dizziness, vertigo, syncope, weakness and headaches.   Psychiatric/Behavioral:  Negative for decreased concentration.       Medical / Social / Family History     Past Medical History:   Diagnosis Date    Abnormal gait 2013    Actinic keratosis 2020    L forearm     Allergic rhinitis 2020    Blepharophimosis 2014    senile    Cervical somatic dysfunction 08/15/2017    Cervicalgia 2019    Chronic peripheral venous hypertension 2015    Chronic serous  otitis media, bilateral 09/05/2019    Chronic venous hypertension (idiopathic) without complications of unspecified lower extremity 08/15/2017    Conjunctival hemorrhage, right eye 11/02/2020    Deep venous thrombosis of lower extremity 09/17/2012    Degeneration of cervical intervertebral disc 10/21/2011    Degeneration of lumbar intervertebral disc 10/21/2011    Degenerative joint disease involving multiple joints 07/25/2011    Depressive disorder 07/25/2011    Essential hypertension 08/12/2020    Essential tremor 06/24/2014    GERD without esophagitis 08/15/2017    Headache 04/07/2020    Hyperlipidemia 05/08/2012    Insomnia 04/06/2016    Osteoarthritis 01/04/2017    Osteoporosis 11/07/2017    Otalgia, right ear 10/17/2016    Other cervical disc degeneration, unspecified cervical region 08/15/2017    Overflow incontinence 01/06/2020    Pain in right knee 01/06/2020    osteoarthritis    Pain in right leg 09/10/2020    Peripheral arterial occlusive disease 02/24/2015    Peripheral vascular disease, unspecified 08/06/2019    Peripheral venous insufficiency 09/29/2015    Personal history of PE (pulmonary embolism) 08/06/2019    Presence of vena cava filter 03/2015    Pulmonary embolus 03/10/2015    Pulmonary infarction 03/16/2012    Pure hypercholesterolemia 07/25/2011    Restless legs 05/22/2018    Right temporomandibular joint disorder, unspecified 08/18/2020    Rosacea 07/01/2015    Sciatica, right side 07/06/2016    Seborrheic keratosis 01/07/2021    Segmental and somatic dysfunction of thoracic region 03/28/2019    Senile osteoporosis 04/24/2017    Spasm of back muscles 07/25/2011    Trochanteric bursitis of right hip 01/06/2020    Unspecified urinary incontinence 02/07/2020    Vitamin D deficiency 10/27/2014       Past Surgical History:   Procedure Laterality Date    APPENDECTOMY      bone density  06/27/2019    Ajith/Abiel    CARPAL TUNNEL RELEASE      BLI    LUMBAR LAMINECTOMY      prolia  03/12/2019    1mg     remove cataract Right     insert lens    TONSILLECTOMY      TOTAL ABDOMINAL HYSTERECTOMY      VAGINAL DELIVERY      vascular surgery procedure       Right SSV Laser Ablation performed by Dr. Carlo hernandez screen  05/24/2018       Social History  Ms.  reports that she has never smoked. She has never used smokeless tobacco. She reports that she does not drink alcohol and does not use drugs.    Family History  Ms.'s family history includes COPD in her daughter and sister; Cancer in her brother; Emphysema in her father; Hearing loss in her father; Hypertension in her father and sister; Melanoma in her brother; Rheum arthritis in her daughter and sister; Skin cancer in her father; Stomach cancer in her sister.    Medications and Allergies     Medications  Outpatient Medications Marked as Taking for the 9/12/22 encounter (Office Visit) with Silva Peters NP   Medication Sig Dispense Refill    albuterol (PROVENTIL/VENTOLIN HFA) 90 mcg/actuation inhaler Inhale 1-2 puffs into the lungs every 4 (four) hours as needed for Wheezing. 18 g 5    amLODIPine (NORVASC) 5 MG tablet Take 1 tablet (5 mg total) by mouth 2 (two) times daily. 180 tablet 1    atorvastatin (LIPITOR) 40 MG tablet Take 1 tablet (40 mg total) by mouth once daily. 90 tablet 1    calcium-vitamin D tablet 600 mg-200 units Take 1 tablet by mouth 2 (two) times a day.      celecoxib (CELEBREX) 200 MG capsule Take 1 capsule (200 mg total) by mouth once daily. 90 capsule 1    denosumab (PROLIA) 60 mg/mL Syrg Inject 60 mg into the skin every 6 (six) months.      diclofenac sodium (VOLTAREN) 1 % Gel Apply 4 grams to large joints 4 times daily 450 g 1    furosemide (LASIX) 20 MG tablet Take 1 tablet (20 mg total) by mouth once daily. 90 tablet 1    gabapentin (NEURONTIN) 300 MG capsule TAKE 1 CAPSULE BY MOUTH IN THE MORNING, TAKE 1 CAPSULE at LUNCH, AND TAKE 2 CAPSULES AT BEDTIME      HYDROcodone-acetaminophen (NORCO)  mg per tablet 05/27/21 TAKE 1 TABLET  BY MOUTH EVERY 8 HOURS AS NEEDED      ipratropium (ATROVENT) 42 mcg (0.06 %) nasal spray 2 sprays by Nasal route 4 (four) times daily. 15 mL 5    losartan (COZAAR) 100 MG tablet Take 1 tablet (100 mg total) by mouth once daily. 90 tablet 1    neomycin-polymyxin-hydrocortisone (CORTISPORIN) otic solution Place 2 drops into both ears every 6 (six) hours.      pramipexole (MIRAPEX) 0.125 MG tablet Take 2 tablets (0.25 mg total) by mouth nightly. 180 tablet 1    propranoloL (INDERAL) 40 MG tablet Take 1 tablet (40 mg total) by mouth 2 (two) times daily. 180 tablet 1    rivaroxaban (XARELTO) 20 mg Tab Take 1 tablet (20 mg total) by mouth once daily. 90 tablet 1    tiZANidine (ZANAFLEX) 4 MG tablet Take 1 tablet (4 mg total) by mouth 2 (two) times daily as needed (muscle spasms). 60 tablet 5    zinc gluconate 50 mg tablet Take 50 mg by mouth once daily.      [DISCONTINUED] desvenlafaxine succinate (PRISTIQ) 100 MG Tb24 Take 1 tablet (100 mg total) by mouth once daily. 90 tablet 1    [DISCONTINUED] omeprazole (PRILOSEC) 20 MG capsule Take 1 capsule (20 mg total) by mouth once daily. 90 capsule 1    [DISCONTINUED] primidone (MYSOLINE) 50 MG Tab TAKE 2 TABLETS BY MOUTH EVERY MORNING AND TAKE 3 TABLETS BY MOUTH EVERY EVENING 450 tablet 1     Current Facility-Administered Medications for the 9/12/22 encounter (Office Visit) with Silva Peters NP   Medication Dose Route Frequency Provider Last Rate Last Admin    [COMPLETED] dexamethasone injection 4 mg  4 mg Intramuscular 1 time in Clinic/HOD Silva Peters NP   4 mg at 09/12/22 0922    [COMPLETED] ketorolac injection 60 mg  60 mg Intramuscular 1 time in Clinic/HOD Silva Peters NP   60 mg at 09/12/22 0925    [COMPLETED] methylPREDNISolone acetate injection 40 mg  40 mg Intramuscular 1 time in Clinic/HOD Silva Peters NP   40 mg at 09/12/22 0923       Allergies  Review of patient's allergies indicates:  No Known Allergies    Physical Examination     Vitals:    09/12/22  "0828   BP: 130/74   BP Location: Right arm   Patient Position: Sitting   Pulse: (!) 57   Resp: 18   Temp: 98.7 °F (37.1 °C)   TempSrc: Oral   SpO2: 97%   Weight: 89.7 kg (197 lb 12.8 oz)   Height: 5' 7" (1.702 m)      Physical Exam  Vitals and nursing note reviewed.   Constitutional:       Appearance: Normal appearance.   HENT:      Head: Normocephalic.      Right Ear: Tympanic membrane, ear canal and external ear normal.      Left Ear: Tympanic membrane, ear canal and external ear normal.      Nose: Nose normal.      Mouth/Throat:      Mouth: Mucous membranes are moist.      Pharynx: Oropharynx is clear.   Eyes:      Extraocular Movements: Extraocular movements intact.      Conjunctiva/sclera: Conjunctivae normal.      Pupils: Pupils are equal, round, and reactive to light.   Cardiovascular:      Rate and Rhythm: Normal rate and regular rhythm.      Pulses: Normal pulses.      Heart sounds: Normal heart sounds.   Pulmonary:      Effort: Pulmonary effort is normal.      Breath sounds: Normal breath sounds.   Abdominal:      General: Bowel sounds are normal.      Palpations: Abdomen is soft.   Musculoskeletal:         General: Tenderness (chroinic pain lower back, rates pain 8, pt is uncomfortable, sitting , standing or any movement) present. Normal range of motion.   Skin:     General: Skin is warm and dry.      Capillary Refill: Capillary refill takes less than 2 seconds.   Neurological:      General: No focal deficit present.      Mental Status: She is alert and oriented to person, place, and time.   Psychiatric:         Mood and Affect: Mood normal.         Behavior: Behavior normal.        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  Dispense: 90 tablet; Refill: 1    GERD without esophagitis  -     omeprazole (PRILOSEC) 20 MG capsule; Take 1 capsule (20 mg total) by mouth once daily.  Dispense: 90 capsule; Refill: 1    Restless legs  -     primidone (MYSOLINE) 50 MG Tab; TAKE 2 TABLETS BY MOUTH EVERY MORNING AND TAKE 3 TABLETS BY MOUTH EVERY EVENING  Dispense: 450 tablet; Refill: 1    Pneumonia due to infectious organism, unspecified laterality, unspecified part of lung  -     X-Ray Chest PA And Lateral; Future; Expected date: 09/12/2022    Chronic bilateral low back pain without sciatica  -     Discontinue: ketorolac (TORADOL) 10 mg tablet; Take 1 tablet (10 mg total) by mouth every 6 (six) hours. for 5 days  Dispense: 20 tablet; Refill: 0  -     dexamethasone injection 4 mg  -     methylPREDNISolone acetate injection 40 mg    Chronic bilateral low back pain with right-sided sciatica  -     ketorolac injection 60 mg    Other orders  -     predniSONE (DELTASONE) 10 MG tablet; Take 1 tablet po daily x 1 week then may repeat cycle if back cont to hurt  Dispense: 21 tablet; Refill: 1          Health Maintenance Due   Topic Date Due    Hepatitis C Screening  Never done    COVID-19 Vaccine (1) Never done    TETANUS VACCINE  Never done    Shingles Vaccine (1 of 2) Never done    Influenza Vaccine (1) 09/01/2022       Problem List Items Addressed This Visit          Neuro    Restless legs    Relevant Medications    primidone (MYSOLINE) 50 MG Tab       Psychiatric    Depression    Relevant Medications    desvenlafaxine succinate (PRISTIQ) 100 MG Tb24       GI    GERD without esophagitis    Relevant Medications    omeprazole (PRILOSEC) 20 MG capsule       Orthopedic    Back pain    Relevant Medications    dexamethasone injection 4 mg (Completed)    methylPREDNISolone acetate injection 40 mg (Completed)    ketorolac injection 60 mg (Completed)      Other Visit Diagnoses       Hyponatremia    -  Primary    Relevant Orders    Basic Metabolic Panel    Pneumonia due to infectious organism, unspecified laterality, unspecified part of lung        Relevant Orders    X-Ray Chest PA And Lateral              Health Maintenance Topics with due status: Not Due       Topic Last Completion Date    DEXA Scan 07/01/2021    Lipid Panel 07/20/2022       Procedures h    Future Appointments   Date Time Provider Department Center   10/13/2022  9:30 AM Dominic Montesinos DO [facility] [place]   10/20/2022  2:00 [facility] [place]   11/29/2022 10:00 AM INFUSION, [place] [facility] [place]   12/13/2022  9:15 AM Silva Peters NP [facility] [place]        Follow up in about 3 months (around 12/12/2022) for f\u.       Signature:  Silva Peters NP    Date of encounter: 9/12/22

## 2022-09-23 ENCOUNTER — DOCUMENTATION ONLY (OUTPATIENT)
Dept: FAMILY MEDICINE | Facility: CLINIC | Age: 77
End: 2022-09-23
Payer: MEDICARE

## 2022-09-23 ENCOUNTER — OFFICE VISIT (OUTPATIENT)
Dept: FAMILY MEDICINE | Facility: CLINIC | Age: 77
End: 2022-09-23
Payer: MEDICARE

## 2022-09-23 ENCOUNTER — HOSPITAL ENCOUNTER (EMERGENCY)
Facility: HOSPITAL | Age: 77
Discharge: HOME | End: 2022-09-23
Payer: MEDICARE

## 2022-09-23 VITALS
OXYGEN SATURATION: 100 % | HEART RATE: 59 BPM | TEMPERATURE: 98 F | DIASTOLIC BLOOD PRESSURE: 71 MMHG | WEIGHT: 197 LBS | SYSTOLIC BLOOD PRESSURE: 183 MMHG | RESPIRATION RATE: 20 BRPM | HEIGHT: 67 IN | BODY MASS INDEX: 30.92 KG/M2

## 2022-09-23 VITALS
RESPIRATION RATE: 20 BRPM | HEIGHT: 67 IN | TEMPERATURE: 99 F | DIASTOLIC BLOOD PRESSURE: 80 MMHG | BODY MASS INDEX: 31.15 KG/M2 | OXYGEN SATURATION: 98 % | WEIGHT: 198.44 LBS | SYSTOLIC BLOOD PRESSURE: 142 MMHG | HEART RATE: 57 BPM

## 2022-09-23 DIAGNOSIS — R06.02 SOB (SHORTNESS OF BREATH): ICD-10-CM

## 2022-09-23 DIAGNOSIS — R06.02 SHORT OF BREATH ON EXERTION: Primary | ICD-10-CM

## 2022-09-23 LAB
ALBUMIN SERPL BCP-MCNC: 4 G/DL (ref 3.5–5)
ALBUMIN/GLOB SERPL: 1.5 {RATIO}
ALP SERPL-CCNC: 87 U/L (ref 55–142)
ALT SERPL W P-5'-P-CCNC: 25 U/L (ref 13–56)
ANION GAP SERPL CALCULATED.3IONS-SCNC: 10 MMOL/L (ref 7–16)
AST SERPL W P-5'-P-CCNC: 26 U/L (ref 15–37)
BASOPHILS # BLD AUTO: 0.06 K/UL (ref 0–0.2)
BASOPHILS NFR BLD AUTO: 0.9 % (ref 0–1)
BILIRUB SERPL-MCNC: 0.5 MG/DL (ref ?–1.2)
BUN SERPL-MCNC: 8 MG/DL (ref 7–18)
BUN/CREAT SERPL: 9 (ref 6–20)
CALCIUM SERPL-MCNC: 9.5 MG/DL (ref 8.5–10.1)
CHLORIDE SERPL-SCNC: 94 MMOL/L (ref 98–107)
CO2 SERPL-SCNC: 30 MMOL/L (ref 21–32)
CREAT SERPL-MCNC: 0.93 MG/DL (ref 0.55–1.02)
CTP QC/QA: YES
D DIMER PPP FEU-MCNC: 0.27 ΜG/ML (ref 0–0.47)
DIFFERENTIAL METHOD BLD: ABNORMAL
EGFR (NO RACE VARIABLE) (RUSH/TITUS): 63 ML/MIN/1.73M²
EOSINOPHIL # BLD AUTO: 0.11 K/UL (ref 0–0.5)
EOSINOPHIL NFR BLD AUTO: 1.7 % (ref 1–4)
ERYTHROCYTE [DISTWIDTH] IN BLOOD BY AUTOMATED COUNT: 14.6 % (ref 11.5–14.5)
FLUAV AG NPH QL: NEGATIVE
FLUBV AG NPH QL: NEGATIVE
GLOBULIN SER-MCNC: 2.6 G/DL (ref 2–4)
GLUCOSE SERPL-MCNC: 115 MG/DL (ref 74–106)
HCT VFR BLD AUTO: 38.9 % (ref 38–47)
HGB BLD-MCNC: 12.5 G/DL (ref 12–16)
LYMPHOCYTES # BLD AUTO: 1.25 K/UL (ref 1–4.8)
LYMPHOCYTES NFR BLD AUTO: 19 % (ref 27–41)
MAGNESIUM SERPL-MCNC: 2.1 MG/DL (ref 1.7–2.3)
MCH RBC QN AUTO: 27.7 PG (ref 27–31)
MCHC RBC AUTO-ENTMCNC: 32.1 G/DL (ref 32–36)
MCV RBC AUTO: 86.1 FL (ref 80–96)
MONOCYTES # BLD AUTO: 0.52 K/UL (ref 0–0.8)
MONOCYTES NFR BLD AUTO: 7.9 % (ref 2–6)
MPC BLD CALC-MCNC: 10.5 FL (ref 9.4–12.4)
NEUTROPHILS # BLD AUTO: 4.63 K/UL (ref 1.8–7.7)
NEUTROPHILS NFR BLD AUTO: 70.5 % (ref 53–65)
NT-PROBNP SERPL-MCNC: 730 PG/ML (ref 1–450)
PLATELET # BLD AUTO: 228 K/UL (ref 150–400)
POTASSIUM SERPL-SCNC: 4.2 MMOL/L (ref 3.5–5.1)
PROT SERPL-MCNC: 6.6 G/DL (ref 6.4–8.2)
RBC # BLD AUTO: 4.52 M/UL (ref 4.2–5.4)
SARS-COV-2 AG RESP QL IA.RAPID: NEGATIVE
SODIUM SERPL-SCNC: 130 MMOL/L (ref 136–145)
TROPONIN I SERPL HS-MCNC: 9 PG/ML
WBC # BLD AUTO: 6.57 K/UL (ref 4.5–11)

## 2022-09-23 PROCEDURE — 99214 PR OFFICE/OUTPT VISIT, EST, LEVL IV, 30-39 MIN: ICD-10-PCS | Mod: ,,, | Performed by: NURSE PRACTITIONER

## 2022-09-23 PROCEDURE — 93005 ELECTROCARDIOGRAM TRACING: CPT

## 2022-09-23 PROCEDURE — 83880 ASSAY OF NATRIURETIC PEPTIDE: CPT | Performed by: NURSE PRACTITIONER

## 2022-09-23 PROCEDURE — 3288F FALL RISK ASSESSMENT DOCD: CPT | Mod: ,,, | Performed by: NURSE PRACTITIONER

## 2022-09-23 PROCEDURE — 1101F PR PT FALLS ASSESS DOC 0-1 FALLS W/OUT INJ PAST YR: ICD-10-PCS | Mod: ,,, | Performed by: NURSE PRACTITIONER

## 2022-09-23 PROCEDURE — 87428 SARSCOV & INF VIR A&B AG IA: CPT | Mod: QW,,, | Performed by: NURSE PRACTITIONER

## 2022-09-23 PROCEDURE — 83735 ASSAY OF MAGNESIUM: CPT | Performed by: NURSE PRACTITIONER

## 2022-09-23 PROCEDURE — 85378 FIBRIN DEGRADE SEMIQUANT: CPT | Performed by: NURSE PRACTITIONER

## 2022-09-23 PROCEDURE — 1126F AMNT PAIN NOTED NONE PRSNT: CPT | Mod: ,,, | Performed by: NURSE PRACTITIONER

## 2022-09-23 PROCEDURE — 85025 COMPLETE CBC W/AUTO DIFF WBC: CPT | Performed by: NURSE PRACTITIONER

## 2022-09-23 PROCEDURE — 87428 POCT SARS-COV2 (COVID) WITH FLU ANTIGEN: ICD-10-PCS | Mod: QW,,, | Performed by: NURSE PRACTITIONER

## 2022-09-23 PROCEDURE — 3077F SYST BP >= 140 MM HG: CPT | Mod: ,,, | Performed by: NURSE PRACTITIONER

## 2022-09-23 PROCEDURE — 80053 COMPREHEN METABOLIC PANEL: CPT | Performed by: NURSE PRACTITIONER

## 2022-09-23 PROCEDURE — 36415 COLL VENOUS BLD VENIPUNCTURE: CPT | Performed by: NURSE PRACTITIONER

## 2022-09-23 PROCEDURE — 99284 EMERGENCY DEPT VISIT MOD MDM: CPT | Mod: 25,GF | Performed by: NURSE PRACTITIONER

## 2022-09-23 PROCEDURE — 93010 ELECTROCARDIOGRAM REPORT: CPT | Performed by: INTERNAL MEDICINE

## 2022-09-23 PROCEDURE — 84484 ASSAY OF TROPONIN QUANT: CPT | Performed by: NURSE PRACTITIONER

## 2022-09-23 PROCEDURE — 1101F PT FALLS ASSESS-DOCD LE1/YR: CPT | Mod: ,,, | Performed by: NURSE PRACTITIONER

## 2022-09-23 PROCEDURE — 99284 EMERGENCY DEPT VISIT MOD MDM: CPT

## 2022-09-23 PROCEDURE — 3079F PR MOST RECENT DIASTOLIC BLOOD PRESSURE 80-89 MM HG: ICD-10-PCS | Mod: ,,, | Performed by: NURSE PRACTITIONER

## 2022-09-23 PROCEDURE — 99214 OFFICE O/P EST MOD 30 MIN: CPT | Mod: ,,, | Performed by: NURSE PRACTITIONER

## 2022-09-23 PROCEDURE — 3079F DIAST BP 80-89 MM HG: CPT | Mod: ,,, | Performed by: NURSE PRACTITIONER

## 2022-09-23 PROCEDURE — 3288F PR FALLS RISK ASSESSMENT DOCUMENTED: ICD-10-PCS | Mod: ,,, | Performed by: NURSE PRACTITIONER

## 2022-09-23 PROCEDURE — 3077F PR MOST RECENT SYSTOLIC BLOOD PRESSURE >= 140 MM HG: ICD-10-PCS | Mod: ,,, | Performed by: NURSE PRACTITIONER

## 2022-09-23 PROCEDURE — 1126F PR PAIN SEVERITY QUANTIFIED, NO PAIN PRESENT: ICD-10-PCS | Mod: ,,, | Performed by: NURSE PRACTITIONER

## 2022-09-23 NOTE — PROGRESS NOTES
Rec'd call from MANAN Paez NP at [facility]. Reports telemed consult w/ [facility] attending. No CT PE indicated r/t D-dimer 0.27. Referred to cardio for stress test. Follow up w/ JUSTIN Peters NP scheduled for 09/28/22 @ 4534.

## 2022-09-23 NOTE — PROGRESS NOTES
Silva Peters NP   [facility]  [address] [address] [place]     PATIENT NAME: Felicia Keita  : 1945  DATE: 22  MRN: [id number]      Billing Provider: Silva Peters NP  Level of Service:   Patient PCP Information       Provider PCP Type    Silva Peters NP General            Reason for Visit / Chief Complaint: Shortness of Breath (C/o shortness of breath .  States she has had sob since she had pneumonia in August.  )       Update PCP  Update Chief Complaint         History of Present Illness / Problem Focused Workflow     Felicia Keita presents to the clinic here c/o sob, exerting easily, stated that she has been sob since she had pneumonia in august, has hx of PE, has stent and takes xarelto      Review of Systems     Review of Systems   Constitutional:  Negative for chills, fatigue and fever.   HENT:  Negative for nasal congestion, ear pain, facial swelling, hearing loss, mouth dryness, mouth sores, postnasal drip, rhinorrhea, sinus pressure/congestion and goiter.    Eyes:  Negative for discharge and itching.   Respiratory:  Positive for shortness of breath. Negative for cough and wheezing.    Cardiovascular:  Negative for chest pain and leg swelling.   Gastrointestinal:  Negative for abdominal pain, change in bowel habit and change in bowel habit.   Genitourinary:  Negative for difficulty urinating, dysuria, enuresis, frequency, hematuria and urgency.   Neurological:  Negative for dizziness, vertigo, syncope, weakness and headaches.   Psychiatric/Behavioral:  Negative for decreased concentration.       Medical / Social / Family History     Past Medical History:   Diagnosis Date    Abnormal gait 2013    Actinic keratosis 2020    L forearm     Allergic rhinitis 2020    Blepharophimosis 2014    senile    Cervical somatic dysfunction 08/15/2017    Cervicalgia 2019    Chronic peripheral venous hypertension 2015    Chronic serous otitis media, bilateral 2019     Chronic venous hypertension (idiopathic) without complications of unspecified lower extremity 08/15/2017    Conjunctival hemorrhage, right eye 11/02/2020    Deep venous thrombosis of lower extremity 09/17/2012    Degeneration of cervical intervertebral disc 10/21/2011    Degeneration of lumbar intervertebral disc 10/21/2011    Degenerative joint disease involving multiple joints 07/25/2011    Depressive disorder 07/25/2011    Essential hypertension 08/12/2020    Essential tremor 06/24/2014    GERD without esophagitis 08/15/2017    Headache 04/07/2020    Hyperlipidemia 05/08/2012    Insomnia 04/06/2016    Osteoarthritis 01/04/2017    Osteoporosis 11/07/2017    Otalgia, right ear 10/17/2016    Other cervical disc degeneration, unspecified cervical region 08/15/2017    Overflow incontinence 01/06/2020    Pain in right knee 01/06/2020    osteoarthritis    Pain in right leg 09/10/2020    Peripheral arterial occlusive disease 02/24/2015    Peripheral vascular disease, unspecified 08/06/2019    Peripheral venous insufficiency 09/29/2015    Personal history of PE (pulmonary embolism) 08/06/2019    Presence of vena cava filter 03/2015    Pulmonary embolus 03/10/2015    Pulmonary infarction 03/16/2012    Pure hypercholesterolemia 07/25/2011    Restless legs 05/22/2018    Right temporomandibular joint disorder, unspecified 08/18/2020    Rosacea 07/01/2015    Sciatica, right side 07/06/2016    Seborrheic keratosis 01/07/2021    Segmental and somatic dysfunction of thoracic region 03/28/2019    Senile osteoporosis 04/24/2017    Spasm of back muscles 07/25/2011    Trochanteric bursitis of right hip 01/06/2020    Unspecified urinary incontinence 02/07/2020    Vitamin D deficiency 10/27/2014       Past Surgical History:   Procedure Laterality Date    APPENDECTOMY      bone density  06/27/2019    Ajith/Abiel    CARPAL TUNNEL RELEASE      BLI    LUMBAR LAMINECTOMY      prolia  03/12/2019    1mg    remove cataract Right     insert  lens    TONSILLECTOMY      TOTAL ABDOMINAL HYSTERECTOMY      VAGINAL DELIVERY      vascular surgery procedure       Right SSV Laser Ablation performed by Dr. Carlo hernandez screen  05/24/2018       Social History  Ms.  reports that she has never smoked. She has never used smokeless tobacco. She reports that she does not drink alcohol and does not use drugs.    Family History  Ms.'s family history includes COPD in her daughter and sister; Cancer in her brother; Emphysema in her father; Hearing loss in her father; Hypertension in her father and sister; Melanoma in her brother; Rheum arthritis in her daughter and sister; Skin cancer in her father; Stomach cancer in her sister.    Medications and Allergies     Medications  Outpatient Medications Marked as Taking for the 9/23/22 encounter (Office Visit) with Silva Peters NP   Medication Sig Dispense Refill    albuterol (PROVENTIL/VENTOLIN HFA) 90 mcg/actuation inhaler Inhale 1-2 puffs into the lungs every 4 (four) hours as needed for Wheezing. 18 g 5    amLODIPine (NORVASC) 5 MG tablet Take 1 tablet (5 mg total) by mouth 2 (two) times daily. 180 tablet 1    ascorbic acid, vitamin C, (VITAMIN C) 500 MG tablet Take 500 mg by mouth once daily.      atorvastatin (LIPITOR) 40 MG tablet Take 1 tablet (40 mg total) by mouth once daily. 90 tablet 1    calcium-vitamin D tablet 600 mg-200 units Take 1 tablet by mouth 2 (two) times a day.      celecoxib (CELEBREX) 200 MG capsule Take 1 capsule (200 mg total) by mouth once daily. 90 capsule 1    denosumab (PROLIA) 60 mg/mL Syrg Inject 60 mg into the skin every 6 (six) months.      desvenlafaxine succinate (PRISTIQ) 100 MG Tb24 Take 1 tablet (100 mg total) by mouth once daily. 90 tablet 1    diclofenac sodium (VOLTAREN) 1 % Gel Apply 4 grams to large joints 4 times daily 450 g 1    furosemide (LASIX) 20 MG tablet Take 1 tablet (20 mg total) by mouth once daily. 90 tablet 1    gabapentin (NEURONTIN) 300 MG capsule TAKE 1 CAPSULE  "BY MOUTH IN THE MORNING, TAKE 1 CAPSULE at LUNCH, AND TAKE 2 CAPSULES AT BEDTIME      HYDROcodone-acetaminophen (NORCO)  mg per tablet 05/27/21 TAKE 1 TABLET BY MOUTH EVERY 8 HOURS AS NEEDED      ipratropium (ATROVENT) 42 mcg (0.06 %) nasal spray 2 sprays by Nasal route 4 (four) times daily. 15 mL 5    losartan (COZAAR) 100 MG tablet Take 1 tablet (100 mg total) by mouth once daily. 90 tablet 1    neomycin-polymyxin-hydrocortisone (CORTISPORIN) otic solution Place 2 drops into both ears every 6 (six) hours.      omeprazole (PRILOSEC) 20 MG capsule Take 1 capsule (20 mg total) by mouth once daily. 90 capsule 1    pramipexole (MIRAPEX) 0.125 MG tablet Take 2 tablets (0.25 mg total) by mouth nightly. 180 tablet 1    predniSONE (DELTASONE) 10 MG tablet Take 1 tablet po daily x 1 week then may repeat cycle if back cont to hurt 21 tablet 1    primidone (MYSOLINE) 50 MG Tab TAKE 2 TABLETS BY MOUTH EVERY MORNING AND TAKE 3 TABLETS BY MOUTH EVERY EVENING 450 tablet 1    propranoloL (INDERAL) 40 MG tablet Take 1 tablet (40 mg total) by mouth 2 (two) times daily. 180 tablet 1    rivaroxaban (XARELTO) 20 mg Tab Take 1 tablet (20 mg total) by mouth once daily. 90 tablet 1    tiZANidine (ZANAFLEX) 4 MG tablet Take 1 tablet (4 mg total) by mouth 2 (two) times daily as needed (muscle spasms). 60 tablet 5    zinc gluconate 50 mg tablet Take 50 mg by mouth once daily.         Allergies  Review of patient's allergies indicates:  No Known Allergies    Physical Examination     Vitals:    09/23/22 0826   BP: (!) 142/80   BP Location: Left arm   Patient Position: Sitting   BP Method: Medium (Manual)   Pulse: (!) 57   Resp: 20   Temp: 98.7 °F (37.1 °C)   TempSrc: Oral   SpO2: 98%   Weight: 90 kg (198 lb 7 oz)   Height: 5' 7.01" (1.702 m)      Physical Exam  Vitals and nursing note reviewed.   Constitutional:       Appearance: Normal appearance.   HENT:      Head: Normocephalic.      Right Ear: Tympanic membrane, ear canal and " external ear normal.      Left Ear: Tympanic membrane, ear canal and external ear normal.      Nose: Nose normal.      Mouth/Throat:      Mouth: Mucous membranes are moist.      Pharynx: Oropharynx is clear.   Eyes:      Extraocular Movements: Extraocular movements intact.      Conjunctiva/sclera: Conjunctivae normal.      Pupils: Pupils are equal, round, and reactive to light.   Cardiovascular:      Rate and Rhythm: Normal rate and regular rhythm.      Pulses: Normal pulses.      Heart sounds: Normal heart sounds.   Pulmonary:      Effort: Pulmonary effort is normal.      Comments: Sl diminished breath sounds  Abdominal:      General: Bowel sounds are normal.      Palpations: Abdomen is soft.   Musculoskeletal:         General: Normal range of motion.      Cervical back: Normal range of motion and neck supple.   Skin:     General: Skin is warm and dry.      Capillary Refill: Capillary refill takes less than 2 seconds.   Neurological:      General: No focal deficit present.      Mental Status: She is alert and oriented to person, place, and time.      Gait: Gait abnormal (uses cane for assistance).   Psychiatric:         Mood and Affect: Mood normal.         Behavior: Behavior normal.        Assessment and Plan (including Health Maintenance)      Problem List  Smart Sets  Document Outside HM   :    Plan: to [facility] for eval of PE, accepted per chino chamorro NP.    Short of breath on exertion  -     X-Ray Chest PA And Lateral; Future; Expected date: 09/23/2022  -     POCT SARS-COV2 (COVID) with Flu Antigen          Health Maintenance Due   Topic Date Due    Hepatitis C Screening  Never done    COVID-19 Vaccine (1) Never done    TETANUS VACCINE  Never done    Shingles Vaccine (1 of 2) Never done    Influenza Vaccine (1) 09/01/2022       Problem List Items Addressed This Visit    None  Visit Diagnoses       Short of breath on exertion    -  Primary    Relevant Orders    X-Ray Chest PA And Lateral (Completed)    POCT  SARS-COV2 (COVID) with Flu Antigen (Completed)              Health Maintenance Topics with due status: Not Due       Topic Last Completion Date    DEXA Scan 07/01/2021    Lipid Panel 07/20/2022       Procedures     Future Appointments   Date Time Provider Department Center   9/28/2022  8:45 [facility] [place]   10/13/2022  9:30 AM Dominic Montesinos DO [facility] [place]   10/20/2022  2:00 PM AWV NURSE [place] [facility] [place]   11/29/2022 10:00 AM INFUSION, [place] [facility] INFUSN [place]   12/13/2022  9:15 AM [facility] [place]        Follow up for as needed adn as scheduled.       Signature:  Silva Peters NP    Date of encounter: 9/23/22

## 2022-09-23 NOTE — ED PROVIDER NOTES
Encounter Date: 9/23/2022       History     Chief Complaint   Patient presents with    Shortness of Breath     Felicia Keita is 76 y/o WF who is sent to the ED by PCP for SOB that has been ongoing since she had pneumonia one month ago. Patient reports SOB with exertion, symptoms have not become worse. Patient has history of PE, has IVC filter and is on Xarelto. Has not missed any doses of Xarelto. Denies any fever, chest pains or cough. PMH: HTN, Hyperlipidemia. No hx CAD/MI. No family hx of heart disease.    CXR today at clinic revealed chronic lung changes, no acute process noted. COVID negative.    The history is provided by the patient.   Review of patient's allergies indicates:  No Known Allergies  Past Medical History:   Diagnosis Date    Abnormal gait 05/22/2013    Actinic keratosis 08/12/2020    L forearm     Allergic rhinitis 04/07/2020    Blepharophimosis 04/22/2014    senile    Cervical somatic dysfunction 08/15/2017    Cervicalgia 03/28/2019    Chronic peripheral venous hypertension 06/16/2015    Chronic serous otitis media, bilateral 09/05/2019    Chronic venous hypertension (idiopathic) without complications of unspecified lower extremity 08/15/2017    Conjunctival hemorrhage, right eye 11/02/2020    Deep venous thrombosis of lower extremity 09/17/2012    Degeneration of cervical intervertebral disc 10/21/2011    Degeneration of lumbar intervertebral disc 10/21/2011    Degenerative joint disease involving multiple joints 07/25/2011    Depressive disorder 07/25/2011    Essential hypertension 08/12/2020    Essential tremor 06/24/2014    GERD without esophagitis 08/15/2017    Headache 04/07/2020    Hyperlipidemia 05/08/2012    Insomnia 04/06/2016    Osteoarthritis 01/04/2017    Osteoporosis 11/07/2017    Otalgia, right ear 10/17/2016    Other cervical disc degeneration, unspecified cervical region 08/15/2017    Overflow incontinence 01/06/2020    Pain in right knee 01/06/2020    osteoarthritis    Pain in  right leg 09/10/2020    Peripheral arterial occlusive disease 02/24/2015    Peripheral vascular disease, unspecified 08/06/2019    Peripheral venous insufficiency 09/29/2015    Personal history of PE (pulmonary embolism) 08/06/2019    Presence of vena cava filter 03/2015    Pulmonary embolus 03/10/2015    Pulmonary infarction 03/16/2012    Pure hypercholesterolemia 07/25/2011    Restless legs 05/22/2018    Right temporomandibular joint disorder, unspecified 08/18/2020    Rosacea 07/01/2015    Sciatica, right side 07/06/2016    Seborrheic keratosis 01/07/2021    Segmental and somatic dysfunction of thoracic region 03/28/2019    Senile osteoporosis 04/24/2017    Spasm of back muscles 07/25/2011    Trochanteric bursitis of right hip 01/06/2020    Unspecified urinary incontinence 02/07/2020    Vitamin D deficiency 10/27/2014     Past Surgical History:   Procedure Laterality Date    APPENDECTOMY      bone density  06/27/2019    Ajith/Abiel    CARPAL TUNNEL RELEASE      BLI    LUMBAR LAMINECTOMY      prolia  03/12/2019    1mg    remove cataract Right     insert lens    TONSILLECTOMY      TOTAL ABDOMINAL HYSTERECTOMY      VAGINAL DELIVERY      vascular surgery procedure       Right SSV Laser Ablation performed by Dr. Carlo hernandez screen  05/24/2018     Family History   Problem Relation Age of Onset    Hearing loss Father     Hypertension Father     Emphysema Father     Skin cancer Father     Stomach cancer Sister     Hypertension Sister     COPD Sister     Rheum arthritis Sister     Cancer Brother     Melanoma Brother     COPD Daughter     Rheum arthritis Daughter      Social History     Tobacco Use    Smoking status: Never    Smokeless tobacco: Never   Substance Use Topics    Alcohol use: Never    Drug use: Never     Review of Systems   Constitutional:  Positive for activity change (decreased due to SOB). Negative for appetite change, fatigue and fever.   HENT: Negative.  Negative for congestion, ear discharge, ear  pain, sinus pressure, sinus pain, sore throat and trouble swallowing.    Eyes: Negative.  Negative for pain and visual disturbance.   Respiratory:  Positive for shortness of breath. Negative for cough, chest tightness and wheezing.    Cardiovascular: Negative.    Gastrointestinal: Negative.  Negative for abdominal pain, constipation, diarrhea, nausea and vomiting.   Genitourinary: Negative.  Negative for dysuria.   Musculoskeletal: Negative.    Skin: Negative.    Neurological: Negative.  Negative for dizziness, light-headedness and headaches.   Hematological: Negative.    Psychiatric/Behavioral: Negative.       Physical Exam     Initial Vitals [09/23/22 0940]   BP Pulse Resp Temp SpO2   (!) 196/90 (!) 56 (!) 22 98.1 °F (36.7 °C) 100 %      MAP       --         Physical Exam    Nursing note and vitals reviewed.  Constitutional: She appears well-developed and well-nourished. She is cooperative. She does not have a sickly appearance. She does not appear ill. No distress.   HENT:   Head: Normocephalic and atraumatic.   Right Ear: External ear normal.   Left Ear: External ear normal.   Mouth/Throat: Mucous membranes are normal.   Eyes: Conjunctivae are normal. Pupils are equal, round, and reactive to light.   Neck: Neck supple.   Normal range of motion.  Cardiovascular:  Normal rate, regular rhythm and normal pulses.           Pulses:       Radial pulses are 2+ on the right side and 2+ on the left side.        Posterior tibial pulses are 2+ on the right side and 2+ on the left side.   No edema   Pulmonary/Chest: Effort normal and breath sounds normal.   Abdominal: Abdomen is soft. There is no abdominal tenderness.   Musculoskeletal:         General: Normal range of motion.      Cervical back: Normal range of motion and neck supple.     Neurological: She is alert and oriented to person, place, and time. She has normal strength. No cranial nerve deficit.   Skin: Skin is warm, dry and intact. Capillary refill takes less  than 2 seconds. No cyanosis. Nails show no clubbing.   Psychiatric: She has a normal mood and affect. Her speech is normal and behavior is normal. Judgment and thought content normal.       Medical Screening Exam   See Full Note    ED Course   Procedures  Labs Reviewed   NT-PRO NATRIURETIC PEPTIDE - Abnormal; Notable for the following components:       Result Value    ProBNP 730 (*)     All other components within normal limits   COMPREHENSIVE METABOLIC PANEL - Abnormal; Notable for the following components:    Sodium 130 (*)     Chloride 94 (*)     Glucose 115 (*)     All other components within normal limits   CBC WITH DIFFERENTIAL - Abnormal; Notable for the following components:    RDW 14.6 (*)     Neutrophils % 70.5 (*)     Lymphocytes % 19.0 (*)     Monocytes % 7.9 (*)     All other components within normal limits   D DIMER, QUANTITATIVE - Normal   MAGNESIUM - Normal   TROPONIN I - Normal   CBC W/ AUTO DIFFERENTIAL    Narrative:     The following orders were created for panel order CBC auto differential.  Procedure                               Abnormality         Status                     ---------                               -----------         ------                     CBC with Differential[[id number]]        Abnormal            Final result                 Please view results for these tests on the individual orders.        ECG Results              EKG 12-lead (In process)  Result time 09/23/22 10:34:11      In process by Interface, Lab In [facility] (09/23/22 10:34:11)                   Narrative:    Test Reason : R06.02,    Vent. Rate : 061 BPM     Atrial Rate : 061 BPM     P-R Int : 218 ms          QRS Dur : 086 ms      QT Int : 428 ms       P-R-T Axes : 056 072 063 degrees     QTc Int : 430 ms    Sinus rhythm with 1st degree A-V block  Nonspecific ST abnormality  Abnormal ECG  No previous ECGs available    Referred By: [email or web address]   SELF           Confirmed By:                                   Imaging  Results    None          Medications - No data to display  Medical Decision Making:   Clinical Tests:   Lab Tests: Ordered and Reviewed  The following lab test(s) were unremarkable: CBC, CMP, BNP and Troponin  Radiological Study: Ordered and Reviewed  Medical Tests: Ordered and Reviewed  ED Management:  10:48 am: TeleMed imitated by call/fax.  11:33 Referral for cardiologist placed. Patient will follow up with PCP next week in clinic. Instructed if SOB becomes worse or she has chest pain to return to the ED. Reviewed discharge instructions and copy of AVS given. She agreed to treatment plan and verbalized understanding.   Other:   I have discussed this case with another health care provider.       <> Summary of the Discussion: 11:06 am: TeleMed with Dr. Sandifer. No need for CT chest since patient had negative d-dimer, IVC filter and is on Xarelto. Patient will need to see cardiologist for evaluation/stress test.                  Clinical Impression:   Final diagnoses:  [R06.02] SOB (shortness of breath) - with exertion      ED Disposition Condition    Discharge Stable          ED Prescriptions    None       Follow-up Information       Follow up With Specialties Details Why Contact Info    Silva Peters NP Family Medicine On 9/28/2022 at 08:45 am for emergency department follow up [address]  [facility] [place] [id number]  [phone]                   Becky Paez, KEDAR  09/23/22 1133

## 2022-09-23 NOTE — ED TRIAGE NOTES
PT ARRIVED TO ER FROM DR MISTY EPSTEIN WITH C/O SOB X 1 MONTH. PT HA HX OF PE.  WANTS PT TO HAVE CT CHEST. PT STATES SHE HAS BEEN THIS WAY SINCE HAVING PNEUMONIA IN AUGUST AND HASN'T BEEN ABLE TO BOUNCE BACK. PT ALSO SAYS SHE HAS AN IVC FILTER IN PLACE FROM PREVIOUS PE IN THE PAST.

## 2022-10-24 DIAGNOSIS — M16.11 PRIMARY OSTEOARTHRITIS OF RIGHT HIP: ICD-10-CM

## 2022-10-24 RX ORDER — DICLOFENAC SODIUM 10 MG/G
GEL TOPICAL
Qty: 450 G | Refills: 1 | Status: SHIPPED | OUTPATIENT
Start: 2022-10-24 | End: 2022-12-15 | Stop reason: SDUPTHER

## 2022-11-29 ENCOUNTER — INFUSION (OUTPATIENT)
Dept: INFUSION THERAPY | Facility: HOSPITAL | Age: 77
End: 2022-11-29
Attending: NURSE PRACTITIONER
Payer: MEDICARE

## 2022-11-29 VITALS
WEIGHT: 199.38 LBS | OXYGEN SATURATION: 94 % | SYSTOLIC BLOOD PRESSURE: 150 MMHG | DIASTOLIC BLOOD PRESSURE: 64 MMHG | HEART RATE: 67 BPM | RESPIRATION RATE: 18 BRPM | BODY MASS INDEX: 31.23 KG/M2 | TEMPERATURE: 98 F

## 2022-11-29 DIAGNOSIS — M81.0 OSTEOPOROSIS, UNSPECIFIED OSTEOPOROSIS TYPE, UNSPECIFIED PATHOLOGICAL FRACTURE PRESENCE: Primary | ICD-10-CM

## 2022-11-29 DIAGNOSIS — M81.0 AGE-RELATED OSTEOPOROSIS WITHOUT CURRENT PATHOLOGICAL FRACTURE: ICD-10-CM

## 2022-11-29 LAB — CALCIUM SERPL-MCNC: 8.7 MG/DL (ref 8.5–10.1)

## 2022-11-29 PROCEDURE — 82310 ASSAY OF CALCIUM: CPT | Performed by: NURSE PRACTITIONER

## 2022-11-29 PROCEDURE — 36415 COLL VENOUS BLD VENIPUNCTURE: CPT | Performed by: NURSE PRACTITIONER

## 2022-11-29 PROCEDURE — 96372 THER/PROPH/DIAG INJ SC/IM: CPT

## 2022-11-29 PROCEDURE — 63600175 PHARM REV CODE 636 W HCPCS: Performed by: NURSE PRACTITIONER

## 2022-11-29 RX ADMIN — DENOSUMAB 60 MG: 60 INJECTION SUBCUTANEOUS at 10:11

## 2022-11-29 NOTE — PROGRESS NOTES
1030  Patient in room 3.  Calcium level today was 8.7    1040  Administered Prolia 60mg SQ to left upper arm per protocol.  Patient tolerated well. Will continue to monitor patient for adverse reactions.     1110 No adverse reactions noted.  Patient discharged to home ambulatory with appt in hand to return in 6 months for next prolia injection.

## 2022-12-09 DIAGNOSIS — Z71.89 COMPLEX CARE COORDINATION: ICD-10-CM

## 2022-12-12 ENCOUNTER — OFFICE VISIT (OUTPATIENT)
Dept: FAMILY MEDICINE | Facility: CLINIC | Age: 77
End: 2022-12-12
Payer: MEDICARE

## 2022-12-12 VITALS
RESPIRATION RATE: 20 BRPM | DIASTOLIC BLOOD PRESSURE: 70 MMHG | OXYGEN SATURATION: 99 % | HEIGHT: 67 IN | BODY MASS INDEX: 31.2 KG/M2 | HEART RATE: 67 BPM | WEIGHT: 198.81 LBS | SYSTOLIC BLOOD PRESSURE: 134 MMHG | TEMPERATURE: 98 F

## 2022-12-12 DIAGNOSIS — M16.11 PRIMARY OSTEOARTHRITIS OF RIGHT HIP: ICD-10-CM

## 2022-12-12 DIAGNOSIS — J44.9 CHRONIC OBSTRUCTIVE PULMONARY DISEASE, UNSPECIFIED COPD TYPE: ICD-10-CM

## 2022-12-12 DIAGNOSIS — G25.81 RESTLESS LEGS: ICD-10-CM

## 2022-12-12 DIAGNOSIS — F33.0 MILD EPISODE OF RECURRENT MAJOR DEPRESSIVE DISORDER: ICD-10-CM

## 2022-12-12 DIAGNOSIS — I10 HYPERTENSION, UNSPECIFIED TYPE: ICD-10-CM

## 2022-12-12 DIAGNOSIS — M54.9 BACK PAIN, UNSPECIFIED BACK LOCATION, UNSPECIFIED BACK PAIN LATERALITY, UNSPECIFIED CHRONICITY: ICD-10-CM

## 2022-12-12 DIAGNOSIS — K21.9 GERD WITHOUT ESOPHAGITIS: ICD-10-CM

## 2022-12-12 DIAGNOSIS — I82.592 CHRONIC DEEP VEIN THROMBOSIS (DVT) OF OTHER VEIN OF LEFT LOWER EXTREMITY: ICD-10-CM

## 2022-12-12 DIAGNOSIS — E78.2 MIXED HYPERLIPIDEMIA: ICD-10-CM

## 2022-12-12 DIAGNOSIS — R60.9 EDEMA, UNSPECIFIED TYPE: ICD-10-CM

## 2022-12-12 DIAGNOSIS — I10 ESSENTIAL HYPERTENSION: ICD-10-CM

## 2022-12-12 PROBLEM — J18.9 PNEUMONIA DUE TO INFECTIOUS ORGANISM: Status: RESOLVED | Noted: 2022-09-12 | Resolved: 2022-12-12

## 2022-12-12 PROCEDURE — 99214 OFFICE O/P EST MOD 30 MIN: CPT | Mod: 25,,, | Performed by: NURSE PRACTITIONER

## 2022-12-12 PROCEDURE — 1125F AMNT PAIN NOTED PAIN PRSNT: CPT | Mod: ,,, | Performed by: NURSE PRACTITIONER

## 2022-12-12 PROCEDURE — 1159F MED LIST DOCD IN RCRD: CPT | Mod: ,,, | Performed by: NURSE PRACTITIONER

## 2022-12-12 PROCEDURE — 99214 PR OFFICE/OUTPT VISIT, EST, LEVL IV, 30-39 MIN: ICD-10-PCS | Mod: 25,,, | Performed by: NURSE PRACTITIONER

## 2022-12-12 PROCEDURE — 3078F PR MOST RECENT DIASTOLIC BLOOD PRESSURE < 80 MM HG: ICD-10-PCS | Mod: ,,, | Performed by: NURSE PRACTITIONER

## 2022-12-12 PROCEDURE — 1159F PR MEDICATION LIST DOCUMENTED IN MEDICAL RECORD: ICD-10-PCS | Mod: ,,, | Performed by: NURSE PRACTITIONER

## 2022-12-12 PROCEDURE — 96372 THER/PROPH/DIAG INJ SC/IM: CPT | Mod: ,,, | Performed by: NURSE PRACTITIONER

## 2022-12-12 PROCEDURE — 96372 PR INJECTION,THERAP/PROPH/DIAG2ST, IM OR SUBCUT: ICD-10-PCS | Mod: ,,, | Performed by: NURSE PRACTITIONER

## 2022-12-12 PROCEDURE — 3075F SYST BP GE 130 - 139MM HG: CPT | Mod: ,,, | Performed by: NURSE PRACTITIONER

## 2022-12-12 PROCEDURE — 1125F PR PAIN SEVERITY QUANTIFIED, PAIN PRESENT: ICD-10-PCS | Mod: ,,, | Performed by: NURSE PRACTITIONER

## 2022-12-12 PROCEDURE — 3075F PR MOST RECENT SYSTOLIC BLOOD PRESS GE 130-139MM HG: ICD-10-PCS | Mod: ,,, | Performed by: NURSE PRACTITIONER

## 2022-12-12 PROCEDURE — 3078F DIAST BP <80 MM HG: CPT | Mod: ,,, | Performed by: NURSE PRACTITIONER

## 2022-12-12 RX ORDER — PROPRANOLOL HYDROCHLORIDE 40 MG/1
40 TABLET ORAL 2 TIMES DAILY
Qty: 180 TABLET | Refills: 1 | Status: SHIPPED | OUTPATIENT
Start: 2022-12-12 | End: 2023-03-13 | Stop reason: SDUPTHER

## 2022-12-12 RX ORDER — OMEPRAZOLE 20 MG/1
20 CAPSULE, DELAYED RELEASE ORAL DAILY
Qty: 90 CAPSULE | Refills: 1 | Status: SHIPPED | OUTPATIENT
Start: 2022-12-12 | End: 2023-03-13 | Stop reason: SDUPTHER

## 2022-12-12 RX ORDER — ALBUTEROL SULFATE 90 UG/1
1-2 AEROSOL, METERED RESPIRATORY (INHALATION) EVERY 4 HOURS PRN
Qty: 18 G | Refills: 5 | Status: SHIPPED | OUTPATIENT
Start: 2022-12-12 | End: 2023-03-13 | Stop reason: SDUPTHER

## 2022-12-12 RX ORDER — CIPROFLOXACIN 500 MG/1
500 TABLET ORAL 2 TIMES DAILY
COMMUNITY
Start: 2022-08-20 | End: 2022-12-12 | Stop reason: ALTCHOICE

## 2022-12-12 RX ORDER — AMOXICILLIN AND CLAVULANATE POTASSIUM 875; 125 MG/1; MG/1
1 TABLET, FILM COATED ORAL 2 TIMES DAILY
COMMUNITY
Start: 2022-08-20 | End: 2022-12-12 | Stop reason: ALTCHOICE

## 2022-12-12 RX ORDER — FUROSEMIDE 20 MG/1
20 TABLET ORAL DAILY
Qty: 90 TABLET | Refills: 1 | Status: SHIPPED | OUTPATIENT
Start: 2022-12-12 | End: 2023-03-13 | Stop reason: SDUPTHER

## 2022-12-12 RX ORDER — PROMETHAZINE HYDROCHLORIDE AND DEXTROMETHORPHAN HYDROBROMIDE 6.25; 15 MG/5ML; MG/5ML
SYRUP ORAL
COMMUNITY
Start: 2022-08-20 | End: 2022-12-12 | Stop reason: ALTCHOICE

## 2022-12-12 RX ORDER — METHYLPREDNISOLONE 4 MG/1
TABLET ORAL
COMMUNITY
Start: 2022-08-20 | End: 2022-12-12 | Stop reason: ALTCHOICE

## 2022-12-12 RX ORDER — CELECOXIB 200 MG/1
200 CAPSULE ORAL DAILY
Qty: 90 CAPSULE | Refills: 1 | Status: SHIPPED | OUTPATIENT
Start: 2022-12-12 | End: 2023-03-13 | Stop reason: SDUPTHER

## 2022-12-12 RX ORDER — KETOROLAC TROMETHAMINE 10 MG/1
10 TABLET, FILM COATED ORAL EVERY 6 HOURS
COMMUNITY
Start: 2022-09-12 | End: 2022-12-12

## 2022-12-12 RX ORDER — KETOROLAC TROMETHAMINE 30 MG/ML
60 INJECTION, SOLUTION INTRAMUSCULAR; INTRAVENOUS
Status: COMPLETED | OUTPATIENT
Start: 2022-12-12 | End: 2022-12-12

## 2022-12-12 RX ORDER — METHYLPREDNISOLONE ACETATE 40 MG/ML
40 INJECTION, SUSPENSION INTRA-ARTICULAR; INTRALESIONAL; INTRAMUSCULAR; SOFT TISSUE
Status: COMPLETED | OUTPATIENT
Start: 2022-12-12 | End: 2022-12-12

## 2022-12-12 RX ORDER — DESVENLAFAXINE 100 MG/1
100 TABLET, EXTENDED RELEASE ORAL DAILY
Qty: 90 TABLET | Refills: 1 | Status: SHIPPED | OUTPATIENT
Start: 2022-12-12 | End: 2023-03-13 | Stop reason: SDUPTHER

## 2022-12-12 RX ORDER — LOSARTAN POTASSIUM 100 MG/1
100 TABLET ORAL DAILY
Qty: 90 TABLET | Refills: 1 | Status: SHIPPED | OUTPATIENT
Start: 2022-12-12 | End: 2023-03-13 | Stop reason: SDUPTHER

## 2022-12-12 RX ORDER — PRIMIDONE 50 MG/1
TABLET ORAL
Qty: 450 TABLET | Refills: 1 | Status: SHIPPED | OUTPATIENT
Start: 2022-12-12 | End: 2023-03-13 | Stop reason: SDUPTHER

## 2022-12-12 RX ORDER — DEXAMETHASONE SODIUM PHOSPHATE 4 MG/ML
4 INJECTION, SOLUTION INTRA-ARTICULAR; INTRALESIONAL; INTRAMUSCULAR; INTRAVENOUS; SOFT TISSUE
Status: COMPLETED | OUTPATIENT
Start: 2022-12-12 | End: 2022-12-12

## 2022-12-12 RX ORDER — ATORVASTATIN CALCIUM 40 MG/1
40 TABLET, FILM COATED ORAL DAILY
Qty: 90 TABLET | Refills: 1 | Status: SHIPPED | OUTPATIENT
Start: 2022-12-12 | End: 2023-03-13 | Stop reason: SDUPTHER

## 2022-12-12 RX ORDER — AMLODIPINE BESYLATE 5 MG/1
5 TABLET ORAL 2 TIMES DAILY
Qty: 180 TABLET | Refills: 1 | Status: SHIPPED | OUTPATIENT
Start: 2022-12-12 | End: 2023-03-13 | Stop reason: SDUPTHER

## 2022-12-12 RX ORDER — TIZANIDINE 4 MG/1
4 TABLET ORAL 2 TIMES DAILY PRN
Qty: 60 TABLET | Refills: 5 | Status: SHIPPED | OUTPATIENT
Start: 2022-12-12 | End: 2023-03-13 | Stop reason: SDUPTHER

## 2022-12-12 RX ORDER — AMOXICILLIN 250 MG
1 CAPSULE ORAL DAILY
COMMUNITY

## 2022-12-12 RX ORDER — PRAMIPEXOLE DIHYDROCHLORIDE 0.12 MG/1
0.25 TABLET ORAL NIGHTLY
Qty: 180 TABLET | Refills: 1 | Status: SHIPPED | OUTPATIENT
Start: 2022-12-12 | End: 2023-03-13 | Stop reason: SDUPTHER

## 2022-12-12 RX ADMIN — KETOROLAC TROMETHAMINE 60 MG: 30 INJECTION, SOLUTION INTRAMUSCULAR; INTRAVENOUS at 03:12

## 2022-12-12 RX ADMIN — DEXAMETHASONE SODIUM PHOSPHATE 4 MG: 4 INJECTION, SOLUTION INTRA-ARTICULAR; INTRALESIONAL; INTRAMUSCULAR; INTRAVENOUS; SOFT TISSUE at 03:12

## 2022-12-12 RX ADMIN — METHYLPREDNISOLONE ACETATE 40 MG: 40 INJECTION, SUSPENSION INTRA-ARTICULAR; INTRALESIONAL; INTRAMUSCULAR; SOFT TISSUE at 03:12

## 2022-12-12 NOTE — PROGRESS NOTES
Silva Peters NP   [facility]  [address] [address] [place]     PATIENT NAME: Felicia Keita  : 1945  DATE: 22  MRN: [id number]      Billing Provider: Silva Peters NP  Level of Service:   Patient PCP Information       Provider PCP Type    Silva Peters NP General            Reason for Visit / Chief Complaint: Follow-up (HTN, Depression, sciatica), Sinus Problem (X a couple weeks), and Back Pain (Has been severe the last week or so. Particularly down right leg. )       Update PCP  Update Chief Complaint         History of Present Illness / Problem Focused Workflow     Felicia Keita presents to the clinic here for eval of htn, depression and sciatica pain, sinus proble x a couple of weeks, back pain, several last week, radiates down right leg      Review of Systems     Review of Systems   Constitutional:  Negative for chills, fatigue and fever.   HENT:  Negative for nasal congestion, ear pain, facial swelling, hearing loss, mouth dryness, mouth sores, postnasal drip, rhinorrhea, sinus pressure/congestion and goiter.    Eyes:  Negative for discharge and itching.   Respiratory:  Negative for cough, shortness of breath and wheezing.    Cardiovascular:  Negative for chest pain and leg swelling.   Gastrointestinal:  Negative for abdominal pain, change in bowel habit and change in bowel habit.   Genitourinary:  Negative for difficulty urinating, dysuria, enuresis, frequency, hematuria and urgency.   Musculoskeletal:  Positive for back pain.   Neurological:  Negative for dizziness, vertigo, syncope, weakness and headaches.   Psychiatric/Behavioral:  Negative for decreased concentration.       Medical / Social / Family History     Past Medical History:   Diagnosis Date    Abnormal gait 2013    Actinic keratosis 2020    L forearm     Allergic rhinitis 2020    Blepharophimosis 2014    senile    Cervical somatic dysfunction 08/15/2017    Cervicalgia 2019    Chronic peripheral  venous hypertension 06/16/2015    Chronic serous otitis media, bilateral 09/05/2019    Chronic venous hypertension (idiopathic) without complications of unspecified lower extremity 08/15/2017    Conjunctival hemorrhage, right eye 11/02/2020    Deep venous thrombosis of lower extremity 09/17/2012    Degeneration of cervical intervertebral disc 10/21/2011    Degeneration of lumbar intervertebral disc 10/21/2011    Degenerative joint disease involving multiple joints 07/25/2011    Depressive disorder 07/25/2011    Essential hypertension 08/12/2020    Essential tremor 06/24/2014    GERD without esophagitis 08/15/2017    Headache 04/07/2020    Hyperlipidemia 05/08/2012    Insomnia 04/06/2016    Osteoarthritis 01/04/2017    Osteoporosis 11/07/2017    Otalgia, right ear 10/17/2016    Other cervical disc degeneration, unspecified cervical region 08/15/2017    Overflow incontinence 01/06/2020    Pain in right knee 01/06/2020    osteoarthritis    Pain in right leg 09/10/2020    Peripheral arterial occlusive disease 02/24/2015    Peripheral vascular disease, unspecified 08/06/2019    Peripheral venous insufficiency 09/29/2015    Personal history of PE (pulmonary embolism) 08/06/2019    Presence of vena cava filter 03/2015    Pulmonary embolus 03/10/2015    Pulmonary infarction 03/16/2012    Pure hypercholesterolemia 07/25/2011    Restless legs 05/22/2018    Right temporomandibular joint disorder, unspecified 08/18/2020    Rosacea 07/01/2015    Sciatica, right side 07/06/2016    Seborrheic keratosis 01/07/2021    Segmental and somatic dysfunction of thoracic region 03/28/2019    Senile osteoporosis 04/24/2017    Spasm of back muscles 07/25/2011    Trochanteric bursitis of right hip 01/06/2020    Unspecified urinary incontinence 02/07/2020    Vitamin D deficiency 10/27/2014       Past Surgical History:   Procedure Laterality Date    APPENDECTOMY      bone density  06/27/2019    Ajith/Abiel    CARPAL TUNNEL RELEASE      BLI     LUMBAR LAMINECTOMY      prolia  03/12/2019    1mg    remove cataract Right     insert lens    TONSILLECTOMY      TOTAL ABDOMINAL HYSTERECTOMY      VAGINAL DELIVERY      vascular surgery procedure       Right SSV Laser Ablation performed by Dr. Carlo hernandez screen  05/24/2018       Social History  Ms.  reports that she has never smoked. She has never used smokeless tobacco. She reports that she does not drink alcohol and does not use drugs.    Family History  Ms.'s family history includes COPD in her daughter and sister; Cancer in her brother; Emphysema in her father; Hearing loss in her father; Hypertension in her father and sister; Melanoma in her brother; Rheum arthritis in her daughter and sister; Skin cancer in her father; Stomach cancer in her sister.    Medications and Allergies     Medications  Outpatient Medications Marked as Taking for the 12/12/22 encounter (Office Visit) with Silva Peters NP   Medication Sig Dispense Refill    ascorbic acid, vitamin C, (VITAMIN C) 500 MG tablet Take 500 mg by mouth once daily.      calcium-vitamin D tablet 600 mg-200 units Take 1 tablet by mouth 2 (two) times a day.      denosumab (PROLIA) 60 mg/mL Syrg Inject 60 mg into the skin every 6 (six) months.      diclofenac sodium (VOLTAREN) 1 % Gel Apply 4 grams to large joints 4 times daily 450 g 1    gabapentin (NEURONTIN) 300 MG capsule TAKE 1 CAPSULE BY MOUTH IN THE MORNING, TAKE 1 CAPSULE at LUNCH, AND TAKE 2 CAPSULES AT BEDTIME      HYDROcodone-acetaminophen (NORCO)  mg per tablet 05/27/21 TAKE 1 TABLET BY MOUTH EVERY 8 HOURS AS NEEDED      ipratropium (ATROVENT) 42 mcg (0.06 %) nasal spray 2 sprays by Nasal route 4 (four) times daily. 15 mL 5    senna-docusate 8.6-50 mg (PERICOLACE) 8.6-50 mg per tablet Take 1 tablet by mouth once daily.      zinc gluconate 50 mg tablet Take 50 mg by mouth once daily.      [DISCONTINUED] albuterol (PROVENTIL/VENTOLIN HFA) 90 mcg/actuation inhaler Inhale 1-2 puffs into the  lungs every 4 (four) hours as needed for Wheezing. 18 g 5    [DISCONTINUED] amLODIPine (NORVASC) 5 MG tablet Take 1 tablet (5 mg total) by mouth 2 (two) times daily. 180 tablet 1    [DISCONTINUED] atorvastatin (LIPITOR) 40 MG tablet Take 1 tablet (40 mg total) by mouth once daily. 90 tablet 1    [DISCONTINUED] celecoxib (CELEBREX) 200 MG capsule Take 1 capsule (200 mg total) by mouth once daily. 90 capsule 1    [DISCONTINUED] desvenlafaxine succinate (PRISTIQ) 100 MG Tb24 Take 1 tablet (100 mg total) by mouth once daily. 90 tablet 1    [DISCONTINUED] furosemide (LASIX) 20 MG tablet Take 1 tablet (20 mg total) by mouth once daily. 90 tablet 1    [DISCONTINUED] losartan (COZAAR) 100 MG tablet Take 1 tablet (100 mg total) by mouth once daily. 90 tablet 1    [DISCONTINUED] omeprazole (PRILOSEC) 20 MG capsule Take 1 capsule (20 mg total) by mouth once daily. 90 capsule 1    [DISCONTINUED] pramipexole (MIRAPEX) 0.125 MG tablet Take 2 tablets (0.25 mg total) by mouth nightly. 180 tablet 1    [DISCONTINUED] primidone (MYSOLINE) 50 MG Tab TAKE 2 TABLETS BY MOUTH EVERY MORNING AND TAKE 3 TABLETS BY MOUTH EVERY EVENING 450 tablet 1    [DISCONTINUED] propranoloL (INDERAL) 40 MG tablet Take 1 tablet (40 mg total) by mouth 2 (two) times daily. 180 tablet 1    [DISCONTINUED] rivaroxaban (XARELTO) 20 mg Tab Take 1 tablet (20 mg total) by mouth once daily. 90 tablet 1    [DISCONTINUED] tiZANidine (ZANAFLEX) 4 MG tablet Take 1 tablet (4 mg total) by mouth 2 (two) times daily as needed (muscle spasms). 60 tablet 5     Current Facility-Administered Medications for the 12/12/22 encounter (Office Visit) with Silva Peters NP   Medication Dose Route Frequency Provider Last Rate Last Admin    dexAMETHasone injection 4 mg  4 mg Intramuscular 1 time in Clinic/HOD Silva Peters NP        ketorolac injection 60 mg  60 mg Intramuscular 1 time in Clinic/HOD Silva Peters NP        methylPREDNISolone acetate injection 40 mg  40 mg  "Intramuscular 1 time in Clinic/HOD Silva Peters NP           Allergies  Review of patient's allergies indicates:  No Known Allergies    Physical Examination     Vitals:    12/12/22 1412   BP: 134/70   BP Location: Left arm   Patient Position: Sitting   Pulse: 67   Resp: 20   Temp: 98.2 °F (36.8 °C)   TempSrc: Oral   SpO2: 99%   Weight: 90.2 kg (198 lb 12.8 oz)   Height: 5' 7" (1.702 m)      Physical Exam  Constitutional:       Appearance: Normal appearance.   HENT:      Head: Normocephalic.      Right Ear: Tympanic membrane, ear canal and external ear normal.      Left Ear: Tympanic membrane, ear canal and external ear normal.      Nose: Nose normal.      Mouth/Throat:      Mouth: Mucous membranes are moist.      Pharynx: Oropharynx is clear.   Eyes:      Extraocular Movements: Extraocular movements intact.      Conjunctiva/sclera: Conjunctivae normal.      Pupils: Pupils are equal, round, and reactive to light.   Cardiovascular:      Rate and Rhythm: Normal rate and regular rhythm.      Pulses: Normal pulses.      Heart sounds: Normal heart sounds.   Pulmonary:      Effort: Pulmonary effort is normal.      Breath sounds: Normal breath sounds.   Abdominal:      General: Bowel sounds are normal.      Palpations: Abdomen is soft.   Musculoskeletal:         General: Tenderness (c/o low back pain that radiates into post right leg, rates pain 10) present. Normal range of motion.      Cervical back: Normal range of motion and neck supple.   Skin:     General: Skin is warm and dry.      Capillary Refill: Capillary refill takes less than 2 seconds.   Neurological:      General: No focal deficit present.      Mental Status: She is alert and oriented to person, place, and time.   Psychiatric:         Mood and Affect: Mood normal.         Behavior: Behavior normal.        Assessment and Plan (including Health Maintenance)      Problem List  Smart Sets  Document Outside HM   :    Plan: meds as ordered, return to [facility] as " needed, and as scheudled    Chronic obstructive pulmonary disease, unspecified COPD type  -     albuterol (PROVENTIL/VENTOLIN HFA) 90 mcg/actuation inhaler; Inhale 1-2 puffs into the lungs every 4 (four) hours as needed for Wheezing.  Dispense: 18 g; Refill: 5    Hypertension, unspecified type  -     amLODIPine (NORVASC) 5 MG tablet; Take 1 tablet (5 mg total) by mouth 2 (two) times daily.  Dispense: 180 tablet; Refill: 1    Mixed hyperlipidemia  -     atorvastatin (LIPITOR) 40 MG tablet; Take 1 tablet (40 mg total) by mouth once daily.  Dispense: 90 tablet; Refill: 1    Primary osteoarthritis of right hip  -     celecoxib (CELEBREX) 200 MG capsule; Take 1 capsule (200 mg total) by mouth once daily.  Dispense: 90 capsule; Refill: 1    Mild episode of recurrent major depressive disorder  -     desvenlafaxine succinate (PRISTIQ) 100 MG Tb24; Take 1 tablet (100 mg total) by mouth once daily.  Dispense: 90 tablet; Refill: 1    Edema, unspecified type  -     furosemide (LASIX) 20 MG tablet; Take 1 tablet (20 mg total) by mouth once daily.  Dispense: 90 tablet; Refill: 1    Essential hypertension  -     losartan (COZAAR) 100 MG tablet; Take 1 tablet (100 mg total) by mouth once daily.  Dispense: 90 tablet; Refill: 1  -     propranoloL (INDERAL) 40 MG tablet; Take 1 tablet (40 mg total) by mouth 2 (two) times daily.  Dispense: 180 tablet; Refill: 1    GERD without esophagitis  -     omeprazole (PRILOSEC) 20 MG capsule; Take 1 capsule (20 mg total) by mouth once daily.  Dispense: 90 capsule; Refill: 1    Restless legs  -     pramipexole (MIRAPEX) 0.125 MG tablet; Take 2 tablets (0.25 mg total) by mouth nightly.  Dispense: 180 tablet; Refill: 1  -     primidone (MYSOLINE) 50 MG Tab; TAKE 2 TABLETS BY MOUTH EVERY MORNING AND TAKE 3 TABLETS BY MOUTH EVERY EVENING  Dispense: 450 tablet; Refill: 1    Chronic deep vein thrombosis (DVT) of other vein of left lower extremity  -     rivaroxaban (XARELTO) 20 mg Tab; Take 1 tablet (20  mg total) by mouth once daily.  Dispense: 90 tablet; Refill: 1    Back pain, unspecified back location, unspecified back pain laterality, unspecified chronicity  -     tiZANidine (ZANAFLEX) 4 MG tablet; Take 1 tablet (4 mg total) by mouth 2 (two) times daily as needed (muscle spasms).  Dispense: 60 tablet; Refill: 5  -     ketorolac injection 60 mg  -     dexAMETHasone injection 4 mg  -     methylPREDNISolone acetate injection 40 mg            Health Maintenance Due   Topic Date Due    Hepatitis C Screening  Never done    COVID-19 Vaccine (1) Never done    TETANUS VACCINE  Never done    Shingles Vaccine (1 of 2) Never done    Influenza Vaccine (1) 09/01/2022       Problem List Items Addressed This Visit          Neuro    Restless legs    Relevant Medications    pramipexole (MIRAPEX) 0.125 MG tablet    primidone (MYSOLINE) 50 MG Tab       Psychiatric    Depression    Relevant Medications    desvenlafaxine succinate (PRISTIQ) 100 MG Tb24       Pulmonary    Chronic obstructive pulmonary disease    Relevant Medications    albuterol (PROVENTIL/VENTOLIN HFA) 90 mcg/actuation inhaler       Cardiac/Vascular    Essential hypertension    Relevant Medications    losartan (COZAAR) 100 MG tablet    propranoloL (INDERAL) 40 MG tablet    Hyperlipidemia    Relevant Medications    atorvastatin (LIPITOR) 40 MG tablet       GI    GERD without esophagitis    Relevant Medications    omeprazole (PRILOSEC) 20 MG capsule       Orthopedic    Back pain    Relevant Medications    tiZANidine (ZANAFLEX) 4 MG tablet    ketorolac injection 60 mg    dexAMETHasone injection 4 mg    methylPREDNISolone acetate injection 40 mg    Osteoarthritis    Relevant Medications    celecoxib (CELEBREX) 200 MG capsule       Other    Edema    Relevant Medications    furosemide (LASIX) 20 MG tablet     Other Visit Diagnoses       Hypertension, unspecified type        Relevant Medications    amLODIPine (NORVASC) 5 MG tablet    Chronic deep vein thrombosis (DVT) of  other vein of left lower extremity        Relevant Medications    rivaroxaban (XARELTO) 20 mg Tab              Health Maintenance Topics with due status: Not Due       Topic Last Completion Date    DEXA Scan 07/01/2021    Lipid Panel 07/20/2022       Procedures     Future Appointments   Date Time Provider Department Center   3/13/2023  1:30 PM Silva Peters [place] [facility] [place]   5/30/2023 10:30 AM [facility], [facility]         Follow up in about 3 months (around 3/12/2023).       Signature:  Silva Peters NP    Date of encounter: 12/12/22

## 2022-12-15 DIAGNOSIS — M16.11 PRIMARY OSTEOARTHRITIS OF RIGHT HIP: ICD-10-CM

## 2022-12-15 DIAGNOSIS — H65.23 BILATERAL CHRONIC SEROUS OTITIS MEDIA: ICD-10-CM

## 2022-12-15 RX ORDER — IPRATROPIUM BROMIDE 42 UG/1
2 SPRAY, METERED NASAL 4 TIMES DAILY
Qty: 15 ML | Refills: 5 | Status: SHIPPED | OUTPATIENT
Start: 2022-12-15 | End: 2023-02-17 | Stop reason: SDUPTHER

## 2022-12-15 RX ORDER — DICLOFENAC SODIUM 10 MG/G
GEL TOPICAL
Qty: 450 G | Refills: 1 | Status: SHIPPED | OUTPATIENT
Start: 2022-12-15 | End: 2023-02-17 | Stop reason: SDUPTHER

## 2023-02-17 DIAGNOSIS — M16.11 PRIMARY OSTEOARTHRITIS OF RIGHT HIP: ICD-10-CM

## 2023-02-17 DIAGNOSIS — H65.23 BILATERAL CHRONIC SEROUS OTITIS MEDIA: ICD-10-CM

## 2023-02-17 RX ORDER — DICLOFENAC SODIUM 10 MG/G
GEL TOPICAL
Qty: 450 G | Refills: 1 | Status: SHIPPED | OUTPATIENT
Start: 2023-02-17 | End: 2023-08-18 | Stop reason: SDUPTHER

## 2023-02-17 RX ORDER — IPRATROPIUM BROMIDE 42 UG/1
2 SPRAY, METERED NASAL 4 TIMES DAILY
Qty: 15 ML | Refills: 5 | Status: SHIPPED | OUTPATIENT
Start: 2023-02-17 | End: 2023-07-11 | Stop reason: SDUPTHER

## 2023-03-13 DIAGNOSIS — I82.592 CHRONIC DEEP VEIN THROMBOSIS (DVT) OF OTHER VEIN OF LEFT LOWER EXTREMITY: ICD-10-CM

## 2023-03-13 DIAGNOSIS — M54.9 BACK PAIN, UNSPECIFIED BACK LOCATION, UNSPECIFIED BACK PAIN LATERALITY, UNSPECIFIED CHRONICITY: ICD-10-CM

## 2023-03-13 DIAGNOSIS — G25.81 RESTLESS LEGS: ICD-10-CM

## 2023-03-13 DIAGNOSIS — E78.2 MIXED HYPERLIPIDEMIA: ICD-10-CM

## 2023-03-13 DIAGNOSIS — K21.9 GERD WITHOUT ESOPHAGITIS: ICD-10-CM

## 2023-03-13 DIAGNOSIS — F33.0 MILD EPISODE OF RECURRENT MAJOR DEPRESSIVE DISORDER: ICD-10-CM

## 2023-03-13 DIAGNOSIS — I10 ESSENTIAL HYPERTENSION: ICD-10-CM

## 2023-03-13 DIAGNOSIS — R60.9 EDEMA, UNSPECIFIED TYPE: ICD-10-CM

## 2023-03-13 DIAGNOSIS — M16.11 PRIMARY OSTEOARTHRITIS OF RIGHT HIP: ICD-10-CM

## 2023-03-13 DIAGNOSIS — J44.9 CHRONIC OBSTRUCTIVE PULMONARY DISEASE, UNSPECIFIED COPD TYPE: ICD-10-CM

## 2023-03-13 DIAGNOSIS — I10 HYPERTENSION, UNSPECIFIED TYPE: ICD-10-CM

## 2023-03-14 RX ORDER — GABAPENTIN 300 MG/1
CAPSULE ORAL
Qty: 380 CAPSULE | Refills: 0 | Status: SHIPPED | OUTPATIENT
Start: 2023-03-14 | End: 2023-03-30 | Stop reason: SDUPTHER

## 2023-03-14 RX ORDER — ALBUTEROL SULFATE 90 UG/1
1-2 AEROSOL, METERED RESPIRATORY (INHALATION) EVERY 4 HOURS PRN
Qty: 18 G | Refills: 2 | Status: SHIPPED | OUTPATIENT
Start: 2023-03-14 | End: 2023-03-30 | Stop reason: SDUPTHER

## 2023-03-14 RX ORDER — PRIMIDONE 50 MG/1
TABLET ORAL
Qty: 450 TABLET | Refills: 0 | Status: SHIPPED | OUTPATIENT
Start: 2023-03-14 | End: 2023-03-30 | Stop reason: SDUPTHER

## 2023-03-14 RX ORDER — AMLODIPINE BESYLATE 5 MG/1
5 TABLET ORAL 2 TIMES DAILY
Qty: 180 TABLET | Refills: 0 | Status: SHIPPED | OUTPATIENT
Start: 2023-03-14 | End: 2023-03-30 | Stop reason: SDUPTHER

## 2023-03-14 RX ORDER — OMEPRAZOLE 20 MG/1
20 CAPSULE, DELAYED RELEASE ORAL DAILY
Qty: 90 CAPSULE | Refills: 0 | Status: SHIPPED | OUTPATIENT
Start: 2023-03-14 | End: 2023-03-30 | Stop reason: SDUPTHER

## 2023-03-14 RX ORDER — DESVENLAFAXINE 100 MG/1
100 TABLET, EXTENDED RELEASE ORAL DAILY
Qty: 90 TABLET | Refills: 0 | Status: SHIPPED | OUTPATIENT
Start: 2023-03-14 | End: 2023-03-30 | Stop reason: SDUPTHER

## 2023-03-14 RX ORDER — ATORVASTATIN CALCIUM 40 MG/1
40 TABLET, FILM COATED ORAL DAILY
Qty: 90 TABLET | Refills: 0 | Status: SHIPPED | OUTPATIENT
Start: 2023-03-14 | End: 2023-03-30 | Stop reason: SDUPTHER

## 2023-03-14 RX ORDER — CELECOXIB 200 MG/1
200 CAPSULE ORAL DAILY
Qty: 90 CAPSULE | Refills: 0 | Status: SHIPPED | OUTPATIENT
Start: 2023-03-14 | End: 2023-03-30 | Stop reason: SDUPTHER

## 2023-03-14 RX ORDER — PROPRANOLOL HYDROCHLORIDE 40 MG/1
40 TABLET ORAL 2 TIMES DAILY
Qty: 180 TABLET | Refills: 0 | Status: SHIPPED | OUTPATIENT
Start: 2023-03-14 | End: 2023-03-30 | Stop reason: SDUPTHER

## 2023-03-14 RX ORDER — PRAMIPEXOLE DIHYDROCHLORIDE 0.12 MG/1
0.25 TABLET ORAL NIGHTLY
Qty: 180 TABLET | Refills: 0 | Status: SHIPPED | OUTPATIENT
Start: 2023-03-14 | End: 2023-03-30 | Stop reason: SDUPTHER

## 2023-03-14 RX ORDER — LOSARTAN POTASSIUM 100 MG/1
100 TABLET ORAL DAILY
Qty: 90 TABLET | Refills: 0 | Status: SHIPPED | OUTPATIENT
Start: 2023-03-14 | End: 2023-03-30 | Stop reason: SDUPTHER

## 2023-03-14 RX ORDER — TIZANIDINE 4 MG/1
4 TABLET ORAL 2 TIMES DAILY PRN
Qty: 60 TABLET | Refills: 2 | Status: SHIPPED | OUTPATIENT
Start: 2023-03-14 | End: 2023-03-30 | Stop reason: SDUPTHER

## 2023-03-14 RX ORDER — FUROSEMIDE 20 MG/1
20 TABLET ORAL DAILY
Qty: 90 TABLET | Refills: 0 | Status: SHIPPED | OUTPATIENT
Start: 2023-03-14 | End: 2023-03-30 | Stop reason: SDUPTHER

## 2023-03-30 ENCOUNTER — OFFICE VISIT (OUTPATIENT)
Dept: FAMILY MEDICINE | Facility: CLINIC | Age: 78
End: 2023-03-30
Payer: MEDICARE

## 2023-03-30 VITALS
WEIGHT: 203.25 LBS | HEART RATE: 86 BPM | BODY MASS INDEX: 31.9 KG/M2 | DIASTOLIC BLOOD PRESSURE: 78 MMHG | TEMPERATURE: 98 F | HEIGHT: 67 IN | OXYGEN SATURATION: 98 % | SYSTOLIC BLOOD PRESSURE: 137 MMHG | RESPIRATION RATE: 20 BRPM

## 2023-03-30 DIAGNOSIS — E78.2 MIXED HYPERLIPIDEMIA: ICD-10-CM

## 2023-03-30 DIAGNOSIS — F33.0 MILD EPISODE OF RECURRENT MAJOR DEPRESSIVE DISORDER: ICD-10-CM

## 2023-03-30 DIAGNOSIS — J44.9 CHRONIC OBSTRUCTIVE PULMONARY DISEASE, UNSPECIFIED COPD TYPE: ICD-10-CM

## 2023-03-30 DIAGNOSIS — K21.9 GERD WITHOUT ESOPHAGITIS: ICD-10-CM

## 2023-03-30 DIAGNOSIS — M16.11 PRIMARY OSTEOARTHRITIS OF RIGHT HIP: ICD-10-CM

## 2023-03-30 DIAGNOSIS — R05.1 ACUTE COUGH: ICD-10-CM

## 2023-03-30 DIAGNOSIS — I82.592 CHRONIC DEEP VEIN THROMBOSIS (DVT) OF OTHER VEIN OF LEFT LOWER EXTREMITY: ICD-10-CM

## 2023-03-30 DIAGNOSIS — I10 HYPERTENSION, UNSPECIFIED TYPE: Primary | ICD-10-CM

## 2023-03-30 DIAGNOSIS — I10 ESSENTIAL HYPERTENSION: ICD-10-CM

## 2023-03-30 DIAGNOSIS — J01.00 ACUTE NON-RECURRENT MAXILLARY SINUSITIS: ICD-10-CM

## 2023-03-30 DIAGNOSIS — G25.81 RESTLESS LEGS: ICD-10-CM

## 2023-03-30 DIAGNOSIS — M54.9 BACK PAIN, UNSPECIFIED BACK LOCATION, UNSPECIFIED BACK PAIN LATERALITY, UNSPECIFIED CHRONICITY: ICD-10-CM

## 2023-03-30 DIAGNOSIS — R60.9 EDEMA, UNSPECIFIED TYPE: ICD-10-CM

## 2023-03-30 PROBLEM — R06.02 SHORT OF BREATH ON EXERTION: Status: RESOLVED | Noted: 2022-09-23 | Resolved: 2023-03-30

## 2023-03-30 PROBLEM — J40 BRONCHITIS: Status: RESOLVED | Noted: 2022-04-20 | Resolved: 2023-03-30

## 2023-03-30 PROBLEM — H65.23 BILATERAL CHRONIC SEROUS OTITIS MEDIA: Status: RESOLVED | Noted: 2022-01-19 | Resolved: 2023-03-30

## 2023-03-30 PROBLEM — E87.1 HYPONATREMIA: Status: RESOLVED | Noted: 2022-09-12 | Resolved: 2023-03-30

## 2023-03-30 PROCEDURE — 96372 PR INJECTION,THERAP/PROPH/DIAG2ST, IM OR SUBCUT: ICD-10-PCS | Mod: ,,, | Performed by: NURSE PRACTITIONER

## 2023-03-30 PROCEDURE — 1159F PR MEDICATION LIST DOCUMENTED IN MEDICAL RECORD: ICD-10-PCS | Mod: ,,, | Performed by: NURSE PRACTITIONER

## 2023-03-30 PROCEDURE — 96372 THER/PROPH/DIAG INJ SC/IM: CPT | Mod: ,,, | Performed by: NURSE PRACTITIONER

## 2023-03-30 PROCEDURE — 3288F FALL RISK ASSESSMENT DOCD: CPT | Mod: ,,, | Performed by: NURSE PRACTITIONER

## 2023-03-30 PROCEDURE — 1125F AMNT PAIN NOTED PAIN PRSNT: CPT | Mod: ,,, | Performed by: NURSE PRACTITIONER

## 2023-03-30 PROCEDURE — 1101F PR PT FALLS ASSESS DOC 0-1 FALLS W/OUT INJ PAST YR: ICD-10-PCS | Mod: ,,, | Performed by: NURSE PRACTITIONER

## 2023-03-30 PROCEDURE — 99214 OFFICE O/P EST MOD 30 MIN: CPT | Mod: 25,,, | Performed by: NURSE PRACTITIONER

## 2023-03-30 PROCEDURE — 3075F SYST BP GE 130 - 139MM HG: CPT | Mod: ,,, | Performed by: NURSE PRACTITIONER

## 2023-03-30 PROCEDURE — 1125F PR PAIN SEVERITY QUANTIFIED, PAIN PRESENT: ICD-10-PCS | Mod: ,,, | Performed by: NURSE PRACTITIONER

## 2023-03-30 PROCEDURE — 1101F PT FALLS ASSESS-DOCD LE1/YR: CPT | Mod: ,,, | Performed by: NURSE PRACTITIONER

## 2023-03-30 PROCEDURE — 3288F PR FALLS RISK ASSESSMENT DOCUMENTED: ICD-10-PCS | Mod: ,,, | Performed by: NURSE PRACTITIONER

## 2023-03-30 PROCEDURE — 3078F PR MOST RECENT DIASTOLIC BLOOD PRESSURE < 80 MM HG: ICD-10-PCS | Mod: ,,, | Performed by: NURSE PRACTITIONER

## 2023-03-30 PROCEDURE — 3078F DIAST BP <80 MM HG: CPT | Mod: ,,, | Performed by: NURSE PRACTITIONER

## 2023-03-30 PROCEDURE — 3075F PR MOST RECENT SYSTOLIC BLOOD PRESS GE 130-139MM HG: ICD-10-PCS | Mod: ,,, | Performed by: NURSE PRACTITIONER

## 2023-03-30 PROCEDURE — 99214 PR OFFICE/OUTPT VISIT, EST, LEVL IV, 30-39 MIN: ICD-10-PCS | Mod: 25,,, | Performed by: NURSE PRACTITIONER

## 2023-03-30 PROCEDURE — 1159F MED LIST DOCD IN RCRD: CPT | Mod: ,,, | Performed by: NURSE PRACTITIONER

## 2023-03-30 RX ORDER — LOSARTAN POTASSIUM 100 MG/1
100 TABLET ORAL DAILY
Qty: 90 TABLET | Refills: 1 | Status: SHIPPED | OUTPATIENT
Start: 2023-03-30 | End: 2023-07-11 | Stop reason: SDUPTHER

## 2023-03-30 RX ORDER — DEXAMETHASONE SODIUM PHOSPHATE 4 MG/ML
4 INJECTION, SOLUTION INTRA-ARTICULAR; INTRALESIONAL; INTRAMUSCULAR; INTRAVENOUS; SOFT TISSUE
Status: COMPLETED | OUTPATIENT
Start: 2023-03-30 | End: 2023-03-30

## 2023-03-30 RX ORDER — CETIRIZINE HYDROCHLORIDE 10 MG/1
10 TABLET ORAL DAILY
Qty: 30 TABLET | Refills: 0 | Status: SHIPPED | OUTPATIENT
Start: 2023-03-30 | End: 2024-03-29

## 2023-03-30 RX ORDER — DESVENLAFAXINE 100 MG/1
100 TABLET, EXTENDED RELEASE ORAL DAILY
Qty: 90 TABLET | Refills: 1 | Status: SHIPPED | OUTPATIENT
Start: 2023-03-30 | End: 2023-07-11 | Stop reason: SDUPTHER

## 2023-03-30 RX ORDER — PROPRANOLOL HYDROCHLORIDE 40 MG/1
40 TABLET ORAL 2 TIMES DAILY
Qty: 180 TABLET | Refills: 1 | Status: SHIPPED | OUTPATIENT
Start: 2023-03-30 | End: 2023-07-11 | Stop reason: SDUPTHER

## 2023-03-30 RX ORDER — GABAPENTIN 300 MG/1
CAPSULE ORAL
Qty: 380 CAPSULE | Refills: 1 | Status: SHIPPED | OUTPATIENT
Start: 2023-03-30

## 2023-03-30 RX ORDER — AZITHROMYCIN 250 MG/1
TABLET, FILM COATED ORAL
Qty: 6 TABLET | Refills: 0 | Status: SHIPPED | OUTPATIENT
Start: 2023-03-30 | End: 2023-04-04

## 2023-03-30 RX ORDER — CEFTRIAXONE 1 G/1
1 INJECTION, POWDER, FOR SOLUTION INTRAMUSCULAR; INTRAVENOUS
Status: COMPLETED | OUTPATIENT
Start: 2023-03-30 | End: 2023-03-30

## 2023-03-30 RX ORDER — IPRATROPIUM BROMIDE AND ALBUTEROL SULFATE 2.5; .5 MG/3ML; MG/3ML
3 SOLUTION RESPIRATORY (INHALATION) EVERY 6 HOURS PRN
Qty: 75 ML | Refills: 0 | Status: SHIPPED | OUTPATIENT
Start: 2023-03-30 | End: 2023-07-11 | Stop reason: SDUPTHER

## 2023-03-30 RX ORDER — FUROSEMIDE 20 MG/1
20 TABLET ORAL DAILY
Qty: 90 TABLET | Refills: 1 | Status: SHIPPED | OUTPATIENT
Start: 2023-03-30 | End: 2023-07-11 | Stop reason: SDUPTHER

## 2023-03-30 RX ORDER — PRAMIPEXOLE DIHYDROCHLORIDE 0.12 MG/1
0.25 TABLET ORAL NIGHTLY
Qty: 180 TABLET | Refills: 1 | Status: SHIPPED | OUTPATIENT
Start: 2023-03-30 | End: 2023-07-11 | Stop reason: SDUPTHER

## 2023-03-30 RX ORDER — OMEPRAZOLE 20 MG/1
20 CAPSULE, DELAYED RELEASE ORAL DAILY
Qty: 90 CAPSULE | Refills: 1 | Status: SHIPPED | OUTPATIENT
Start: 2023-03-30 | End: 2023-07-11 | Stop reason: SDUPTHER

## 2023-03-30 RX ORDER — PRIMIDONE 50 MG/1
TABLET ORAL
Qty: 450 TABLET | Refills: 1 | Status: SHIPPED | OUTPATIENT
Start: 2023-03-30 | End: 2023-07-11 | Stop reason: SDUPTHER

## 2023-03-30 RX ORDER — TIZANIDINE 4 MG/1
4 TABLET ORAL 2 TIMES DAILY PRN
Qty: 60 TABLET | Refills: 2 | Status: SHIPPED | OUTPATIENT
Start: 2023-03-30 | End: 2023-07-11 | Stop reason: SDUPTHER

## 2023-03-30 RX ORDER — AMLODIPINE BESYLATE 5 MG/1
5 TABLET ORAL 2 TIMES DAILY
Qty: 180 TABLET | Refills: 1 | Status: SHIPPED | OUTPATIENT
Start: 2023-03-30 | End: 2023-07-11 | Stop reason: SDUPTHER

## 2023-03-30 RX ORDER — CELECOXIB 200 MG/1
200 CAPSULE ORAL DAILY
Qty: 90 CAPSULE | Refills: 0 | Status: SHIPPED | OUTPATIENT
Start: 2023-03-30 | End: 2023-07-11 | Stop reason: SDUPTHER

## 2023-03-30 RX ORDER — ALBUTEROL SULFATE 90 UG/1
1-2 AEROSOL, METERED RESPIRATORY (INHALATION) EVERY 4 HOURS PRN
Qty: 18 G | Refills: 2 | Status: SHIPPED | OUTPATIENT
Start: 2023-03-30 | End: 2023-06-19

## 2023-03-30 RX ORDER — ATORVASTATIN CALCIUM 40 MG/1
40 TABLET, FILM COATED ORAL DAILY
Qty: 90 TABLET | Refills: 0 | Status: SHIPPED | OUTPATIENT
Start: 2023-03-30 | End: 2023-07-11 | Stop reason: SDUPTHER

## 2023-03-30 RX ADMIN — DEXAMETHASONE SODIUM PHOSPHATE 4 MG: 4 INJECTION, SOLUTION INTRA-ARTICULAR; INTRALESIONAL; INTRAMUSCULAR; INTRAVENOUS; SOFT TISSUE at 11:03

## 2023-03-30 RX ADMIN — CEFTRIAXONE 1 G: 1 INJECTION, POWDER, FOR SOLUTION INTRAMUSCULAR; INTRAVENOUS at 11:03

## 2023-03-30 NOTE — ASSESSMENT & PLAN NOTE
Take meds as ordered, keep all tonya with pain clinic, avoid lifting more than 5 jpounds or doing repetitive straining

## 2023-03-30 NOTE — ASSESSMENT & PLAN NOTE
Elevate Head of bed  Do not eat at least 2 hours before bedtime  Avoid caffeine, nicotine, etoh, and spicy foods  Eat small frequent feedings throughout the day,   Meds as ordered,  Return to clinic as needed

## 2023-03-30 NOTE — ASSESSMENT & PLAN NOTE
Low fat low chol diet, exercise daily, meds as ordered, return to clinic as scheduled and as needed

## 2023-03-30 NOTE — PROGRESS NOTES
Silva Peters NP   [facility]  [address] [facility] [address]  [place]     PATIENT NAME: Felicia Keita  : 1945  DATE: 3/30/23  MRN: [id number]      Billing Provider: Silva Peters NP  Level of Service:   Patient PCP Information       Provider PCP Type    Silva Peters NP General            Reason for Visit / Chief Complaint: Follow-up (3 month f/u  need refills and fasting labs), Hypertension, Hyperlipidemia, Back Pain (C/o  low back pain  that radiates into hips / denies any injury), and Cough (C/o productive cough, states coughing up yellow sputum)       Update PCP  Update Chief Complaint         History of Present Illness / Problem Focused Workflow     Felicia Keita presents to the clinic here to clinic regarding 3 months eval of htn, hyperlipidemia, chroniic back pain and cough, yellow sputum      Review of Systems     Review of Systems   Constitutional:  Negative for chills, fatigue and fever.   HENT:  Negative for nasal congestion, ear pain, facial swelling, hearing loss, mouth dryness, mouth sores, postnasal drip, rhinorrhea, sinus pressure/congestion and goiter.    Eyes:  Negative for discharge and itching.   Respiratory:  Positive for cough. Negative for shortness of breath and wheezing.    Cardiovascular:  Negative for chest pain and leg swelling.   Gastrointestinal:  Negative for abdominal pain, change in bowel habit and change in bowel habit.   Genitourinary:  Negative for difficulty urinating, dysuria, enuresis, frequency, hematuria and urgency.   Musculoskeletal:  Positive for back pain (chronic).   Neurological:  Negative for dizziness, vertigo, syncope, weakness and headaches.   Psychiatric/Behavioral:  Negative for decreased concentration.       Medical / Social / Family History     Past Medical History:   Diagnosis Date    Abnormal gait 2013    Actinic keratosis 2020    L forearm     Allergic rhinitis 2020    Blepharophimosis 2014    senile    Cervical somatic  dysfunction 08/15/2017    Cervicalgia 03/28/2019    Chronic peripheral venous hypertension 06/16/2015    Chronic serous otitis media, bilateral 09/05/2019    Chronic venous hypertension (idiopathic) without complications of unspecified lower extremity 08/15/2017    Conjunctival hemorrhage, right eye 11/02/2020    Deep venous thrombosis of lower extremity 09/17/2012    Degeneration of cervical intervertebral disc 10/21/2011    Degeneration of lumbar intervertebral disc 10/21/2011    Degenerative joint disease involving multiple joints 07/25/2011    Depressive disorder 07/25/2011    Essential hypertension 08/12/2020    Essential tremor 06/24/2014    GERD without esophagitis 08/15/2017    Headache 04/07/2020    Hyperlipidemia 05/08/2012    Insomnia 04/06/2016    Osteoarthritis 01/04/2017    Osteoporosis 11/07/2017    Otalgia, right ear 10/17/2016    Other cervical disc degeneration, unspecified cervical region 08/15/2017    Overflow incontinence 01/06/2020    Pain in right knee 01/06/2020    osteoarthritis    Pain in right leg 09/10/2020    Peripheral arterial occlusive disease 02/24/2015    Peripheral vascular disease, unspecified 08/06/2019    Peripheral venous insufficiency 09/29/2015    Personal history of PE (pulmonary embolism) 08/06/2019    Presence of vena cava filter 03/2015    Pulmonary embolus 03/10/2015    Pulmonary infarction 03/16/2012    Pure hypercholesterolemia 07/25/2011    Restless legs 05/22/2018    Right temporomandibular joint disorder, unspecified 08/18/2020    Rosacea 07/01/2015    Sciatica, right side 07/06/2016    Seborrheic keratosis 01/07/2021    Segmental and somatic dysfunction of thoracic region 03/28/2019    Senile osteoporosis 04/24/2017    Spasm of back muscles 07/25/2011    Trochanteric bursitis of right hip 01/06/2020    Unspecified urinary incontinence 02/07/2020    Vitamin D deficiency 10/27/2014       Past Surgical History:   Procedure Laterality Date    APPENDECTOMY      bone  density  06/27/2019    Ajith/Abiel    CARPAL TUNNEL RELEASE      BLI    LUMBAR LAMINECTOMY      prolia  03/12/2019    1mg    remove cataract Right     insert lens    TONSILLECTOMY      TOTAL ABDOMINAL HYSTERECTOMY      VAGINAL DELIVERY      vascular surgery procedure       Right SSV Laser Ablation performed by Dr. Carlo terry  05/24/2018       Social History  Ms.  reports that she has never smoked. She has never used smokeless tobacco. She reports that she does not drink alcohol and does not use drugs.    Family History  Ms.'s family history includes COPD in her daughter and sister; Cancer in her brother; Emphysema in her father; Hearing loss in her father; Hypertension in her father and sister; Melanoma in her brother; Rheum arthritis in her daughter and sister; Skin cancer in her father; Stomach cancer in her sister.    Medications and Allergies     Medications  Outpatient Medications Marked as Taking for the 3/30/23 encounter (Office Visit) with Silva Peters NP   Medication Sig Dispense Refill    ascorbic acid, vitamin C, (VITAMIN C) 500 MG tablet Take 500 mg by mouth once daily.      calcium-vitamin D tablet 600 mg-200 units Take 1 tablet by mouth 2 (two) times a day.      denosumab (PROLIA) 60 mg/mL Syrg Inject 60 mg into the skin every 6 (six) months.      diclofenac sodium (VOLTAREN) 1 % Gel Apply 4 grams to large joints 4 times daily 450 g 1    HYDROcodone-acetaminophen (NORCO)  mg per tablet 05/27/21 TAKE 1 TABLET BY MOUTH EVERY 8 HOURS AS NEEDED      ipratropium (ATROVENT) 42 mcg (0.06 %) nasal spray 2 sprays by Each Nostril route 4 (four) times daily. 15 mL 5    senna-docusate 8.6-50 mg (PERICOLACE) 8.6-50 mg per tablet Take 1 tablet by mouth once daily.      zinc gluconate 50 mg tablet Take 50 mg by mouth once daily.      [DISCONTINUED] albuterol (PROVENTIL/VENTOLIN HFA) 90 mcg/actuation inhaler Inhale 1-2 puffs into the lungs every 4 (four) hours as needed for Wheezing. 18 g 2     [DISCONTINUED] amLODIPine (NORVASC) 5 MG tablet Take 1 tablet (5 mg total) by mouth 2 (two) times daily. 180 tablet 0    [DISCONTINUED] atorvastatin (LIPITOR) 40 MG tablet Take 1 tablet (40 mg total) by mouth once daily. 90 tablet 0    [DISCONTINUED] celecoxib (CELEBREX) 200 MG capsule Take 1 capsule (200 mg total) by mouth once daily. 90 capsule 0    [DISCONTINUED] desvenlafaxine succinate (PRISTIQ) 100 MG Tb24 Take 1 tablet (100 mg total) by mouth once daily. 90 tablet 0    [DISCONTINUED] furosemide (LASIX) 20 MG tablet Take 1 tablet (20 mg total) by mouth once daily. 90 tablet 0    [DISCONTINUED] gabapentin (NEURONTIN) 300 MG capsule TAKE 1 CAPSULE BY MOUTH IN THE MORNING, TAKE 1 CAPSULE at LUNCH, AND TAKE 2 CAPSULES AT BEDTIME 380 capsule 0    [DISCONTINUED] losartan (COZAAR) 100 MG tablet Take 1 tablet (100 mg total) by mouth once daily. 90 tablet 0    [DISCONTINUED] omeprazole (PRILOSEC) 20 MG capsule Take 1 capsule (20 mg total) by mouth once daily. 90 capsule 0    [DISCONTINUED] pramipexole (MIRAPEX) 0.125 MG tablet Take 2 tablets (0.25 mg total) by mouth nightly. 180 tablet 0    [DISCONTINUED] primidone (MYSOLINE) 50 MG Tab TAKE 2 TABLETS BY MOUTH EVERY MORNING AND TAKE 3 TABLETS BY MOUTH EVERY EVENING 450 tablet 0    [DISCONTINUED] propranoloL (INDERAL) 40 MG tablet Take 1 tablet (40 mg total) by mouth 2 (two) times daily. 180 tablet 0    [DISCONTINUED] rivaroxaban (XARELTO) 20 mg Tab Take 1 tablet (20 mg total) by mouth once daily. 90 tablet 0    [DISCONTINUED] tiZANidine (ZANAFLEX) 4 MG tablet Take 1 tablet (4 mg total) by mouth 2 (two) times daily as needed (muscle spasms). 60 tablet 2     Current Facility-Administered Medications for the 3/30/23 encounter (Office Visit) with Silva Peters NP   Medication Dose Route Frequency Provider Last Rate Last Admin    [COMPLETED] cefTRIAXone injection 1 g  1 g Intramuscular 1 time in Clinic/HOD Silva Peters NP   1 g at 03/30/23 1102    [COMPLETED]  "dexAMETHasone injection 4 mg  4 mg Intramuscular 1 time in Clinic/HOD Silva Peters NP   4 mg at 03/30/23 1103       Allergies  Review of patient's allergies indicates:  No Known Allergies    Physical Examination     Vitals:    03/30/23 1017   BP: 137/78   BP Location: Left arm   Patient Position: Sitting   BP Method: Medium (Manual)   Pulse: 86   Resp: 20   Temp: 98 °F (36.7 °C)   TempSrc: Oral   SpO2: 98%   Weight: 92.2 kg (203 lb 4 oz)   Height: 5' 7.01" (1.702 m)      Physical Exam  Constitutional:       Appearance: Normal appearance.   HENT:      Head: Normocephalic.      Right Ear: Tympanic membrane, ear canal and external ear normal.      Left Ear: Tympanic membrane, ear canal and external ear normal.      Nose: Nose normal.      Comments: Mod amt thick yellow nasal secretion, pressure over max region     Mouth/Throat:      Mouth: Mucous membranes are moist.      Pharynx: Oropharynx is clear.   Eyes:      Extraocular Movements: Extraocular movements intact.      Conjunctiva/sclera: Conjunctivae normal.      Pupils: Pupils are equal, round, and reactive to light.   Neck:      Comments: Fox ant cervical nodes  Cardiovascular:      Rate and Rhythm: Normal rate and regular rhythm.      Pulses: Normal pulses.      Heart sounds: Normal heart sounds.   Pulmonary:      Effort: Pulmonary effort is normal.      Breath sounds: Normal breath sounds.   Abdominal:      General: Bowel sounds are normal.      Palpations: Abdomen is soft.   Musculoskeletal:         General: Tenderness (chronic low back pain, movment increases pain, goes to pain clinic) present. Normal range of motion.      Cervical back: Normal range of motion.   Lymphadenopathy:      Cervical: Cervical adenopathy present.   Skin:     General: Skin is warm and dry.      Capillary Refill: Capillary refill takes less than 2 seconds.   Neurological:      General: No focal deficit present.      Mental Status: She is alert and oriented to person, place, and " time.   Psychiatric:         Mood and Affect: Mood normal.         Behavior: Behavior normal.        Assessment and Plan (including Health Maintenance)      Problem List  Smart Sets  Document Outside HM   :    Plan:     Hypertension, unspecified type  -     CBC Auto Differential; Future; Expected date: 03/30/2023  -     Comprehensive Metabolic Panel; Future; Expected date: 03/30/2023  -     amLODIPine (NORVASC) 5 MG tablet; Take 1 tablet (5 mg total) by mouth 2 (two) times daily.  Dispense: 180 tablet; Refill: 1    Mixed hyperlipidemia  -     Lipid Panel; Future; Expected date: 03/30/2023  -     atorvastatin (LIPITOR) 40 MG tablet; Take 1 tablet (40 mg total) by mouth once daily.  Dispense: 90 tablet; Refill: 0    Chronic obstructive pulmonary disease, unspecified COPD type  -     albuterol (PROVENTIL/VENTOLIN HFA) 90 mcg/actuation inhaler; Inhale 1-2 puffs into the lungs every 4 (four) hours as needed for Wheezing.  Dispense: 18 g; Refill: 2    Primary osteoarthritis of right hip  -     celecoxib (CELEBREX) 200 MG capsule; Take 1 capsule (200 mg total) by mouth once daily.  Dispense: 90 capsule; Refill: 0    Mild episode of recurrent major depressive disorder  -     desvenlafaxine succinate (PRISTIQ) 100 MG Tb24; Take 1 tablet (100 mg total) by mouth once daily.  Dispense: 90 tablet; Refill: 1    Edema, unspecified type  -     furosemide (LASIX) 20 MG tablet; Take 1 tablet (20 mg total) by mouth once daily.  Dispense: 90 tablet; Refill: 1    Essential hypertension  -     losartan (COZAAR) 100 MG tablet; Take 1 tablet (100 mg total) by mouth once daily.  Dispense: 90 tablet; Refill: 1  -     propranoloL (INDERAL) 40 MG tablet; Take 1 tablet (40 mg total) by mouth 2 (two) times daily.  Dispense: 180 tablet; Refill: 1    GERD without esophagitis  -     omeprazole (PRILOSEC) 20 MG capsule; Take 1 capsule (20 mg total) by mouth once daily.  Dispense: 90 capsule; Refill: 1    Restless legs  -     pramipexole (MIRAPEX)  0.125 MG tablet; Take 2 tablets (0.25 mg total) by mouth nightly.  Dispense: 180 tablet; Refill: 1  -     primidone (MYSOLINE) 50 MG Tab; TAKE 2 TABLETS BY MOUTH EVERY MORNING AND TAKE 3 TABLETS BY MOUTH EVERY EVENING  Dispense: 450 tablet; Refill: 1    Chronic deep vein thrombosis (DVT) of other vein of left lower extremity  -     rivaroxaban (XARELTO) 20 mg Tab; Take 1 tablet (20 mg total) by mouth once daily.  Dispense: 90 tablet; Refill: 1    Back pain, unspecified back location, unspecified back pain laterality, unspecified chronicity  -     tiZANidine (ZANAFLEX) 4 MG tablet; Take 1 tablet (4 mg total) by mouth 2 (two) times daily as needed (muscle spasms).  Dispense: 60 tablet; Refill: 2    Acute cough  -     X-Ray Chest PA And Lateral; Future; Expected date: 03/30/2023    Acute non-recurrent maxillary sinusitis  -     cefTRIAXone injection 1 g  -     dexAMETHasone injection 4 mg    Other orders  -     gabapentin (NEURONTIN) 300 MG capsule; TAKE 1 CAPSULE BY MOUTH IN THE MORNING, TAKE 1 CAPSULE at LUNCH, AND TAKE 2 CAPSULES AT BEDTIME  Dispense: 380 capsule; Refill: 1  -     azithromycin (Z-TOBI) 250 MG tablet; Take 2 tablets by mouth on day 1; Take 1 tablet by mouth on days 2-5  Dispense: 6 tablet; Refill: 0  -     pyrilam-chlophed-acetaminophen 12.5-12.5-160 mg/5 mL Liqd; Take 10 mLs by mouth every 8 (eight) hours as needed (COUGH).  Dispense: 473 mL; Refill: 0  -     cetirizine (ZYRTEC) 10 MG tablet; Take 1 tablet (10 mg total) by mouth once daily.  Dispense: 30 tablet; Refill: 0            Health Maintenance Due   Topic Date Due    Hepatitis C Screening  Never done    COVID-19 Vaccine (1) Never done    TETANUS VACCINE  Never done    Shingles Vaccine (1 of 2) Never done       Problem List Items Addressed This Visit          Neuro    Restless legs    Relevant Medications    pramipexole (MIRAPEX) 0.125 MG tablet    primidone (MYSOLINE) 50 MG Tab       Psychiatric    Depression    Relevant Medications     desvenlafaxine succinate (PRISTIQ) 100 MG Tb24       Pulmonary    Chronic obstructive pulmonary disease    Relevant Medications    albuterol (PROVENTIL/VENTOLIN HFA) 90 mcg/actuation inhaler       Cardiac/Vascular    Essential hypertension    Relevant Medications    losartan (COZAAR) 100 MG tablet    propranoloL (INDERAL) 40 MG tablet    Hyperlipidemia    Relevant Medications    atorvastatin (LIPITOR) 40 MG tablet    Other Relevant Orders    Lipid Panel       GI    GERD without esophagitis    Relevant Medications    omeprazole (PRILOSEC) 20 MG capsule       Orthopedic    Back pain    Relevant Medications    tiZANidine (ZANAFLEX) 4 MG tablet    Osteoarthritis    Relevant Medications    celecoxib (CELEBREX) 200 MG capsule       Other    Edema    Relevant Medications    furosemide (LASIX) 20 MG tablet     Other Visit Diagnoses       Hypertension, unspecified type    -  Primary    Relevant Medications    amLODIPine (NORVASC) 5 MG tablet    Other Relevant Orders    CBC Auto Differential    Comprehensive Metabolic Panel    Chronic deep vein thrombosis (DVT) of other vein of left lower extremity        Relevant Medications    rivaroxaban (XARELTO) 20 mg Tab    Acute cough        Relevant Orders    X-Ray Chest PA And Lateral    Acute non-recurrent maxillary sinusitis        Relevant Medications    cefTRIAXone injection 1 g (Completed)    dexAMETHasone injection 4 mg (Completed)              Health Maintenance Topics with due status: Not Due       Topic Last Completion Date    DEXA Scan 07/01/2021    Lipid Panel 07/20/2022       Procedures     Future Appointments   Date Time Provider Department Center   5/2/2023  2:00 PM AWV NURSE, CARLOS EDUARDO [facility]ILY [facility]ICINE [facility] [place]   5/30/2023 10:30 AM INFUSION, [facility] INFUS[facility] Carlos Eduardo [place]        Follow up in about 3 months (around 6/30/2023).       Signature:  Silva Peters NP    Date of encounter: 3/30/23

## 2023-04-03 DIAGNOSIS — J44.9 CHRONIC OBSTRUCTIVE PULMONARY DISEASE, UNSPECIFIED COPD TYPE: ICD-10-CM

## 2023-04-03 RX ORDER — IPRATROPIUM BROMIDE AND ALBUTEROL SULFATE 2.5; .5 MG/3ML; MG/3ML
3 SOLUTION RESPIRATORY (INHALATION) EVERY 6 HOURS PRN
Qty: 75 ML | Refills: 0 | Status: CANCELLED | OUTPATIENT
Start: 2023-04-03 | End: 2024-04-02

## 2023-05-30 ENCOUNTER — INFUSION (OUTPATIENT)
Dept: INFUSION THERAPY | Facility: HOSPITAL | Age: 78
End: 2023-05-30
Attending: NURSE PRACTITIONER
Payer: MEDICARE

## 2023-05-30 VITALS
HEIGHT: 67 IN | OXYGEN SATURATION: 96 % | TEMPERATURE: 98 F | HEART RATE: 64 BPM | DIASTOLIC BLOOD PRESSURE: 64 MMHG | BODY MASS INDEX: 31.83 KG/M2 | RESPIRATION RATE: 18 BRPM | SYSTOLIC BLOOD PRESSURE: 147 MMHG | WEIGHT: 202.81 LBS

## 2023-05-30 DIAGNOSIS — M81.0 OSTEOPOROSIS, UNSPECIFIED OSTEOPOROSIS TYPE, UNSPECIFIED PATHOLOGICAL FRACTURE PRESENCE: Primary | ICD-10-CM

## 2023-05-30 DIAGNOSIS — M81.0 AGE-RELATED OSTEOPOROSIS WITHOUT CURRENT PATHOLOGICAL FRACTURE: ICD-10-CM

## 2023-05-30 LAB — CALCIUM SERPL-MCNC: 9.2 MG/DL (ref 8.5–10.1)

## 2023-05-30 PROCEDURE — 63600175 PHARM REV CODE 636 W HCPCS: Mod: JZ,JG | Performed by: NURSE PRACTITIONER

## 2023-05-30 PROCEDURE — 82310 ASSAY OF CALCIUM: CPT | Performed by: NURSE PRACTITIONER

## 2023-05-30 PROCEDURE — 96372 THER/PROPH/DIAG INJ SC/IM: CPT

## 2023-05-30 RX ADMIN — DENOSUMAB 60 MG: 60 INJECTION SUBCUTANEOUS at 10:05

## 2023-05-30 NOTE — PROGRESS NOTES
1005 Pt in room 1.  Vital Sign obtained.     1025 Blood drawn for Calcium level as per order.      1043  Calcium level checked today was 9.2.  Pt stated that she is taking her Calcium with Vitamin D.     1044 Administered Prolia 60mg SQ to left upper arm per protocol.  Pt tolerated well.    1104 No adverse reaction noted.  D/C home, ambulatory with appointment in hand to return in 6 months for next Prolia injection.

## 2023-06-17 DIAGNOSIS — J44.9 CHRONIC OBSTRUCTIVE PULMONARY DISEASE, UNSPECIFIED COPD TYPE: ICD-10-CM

## 2023-06-19 RX ORDER — ALBUTEROL SULFATE 90 UG/1
AEROSOL, METERED RESPIRATORY (INHALATION)
Qty: 18 G | Refills: 2 | Status: SHIPPED | OUTPATIENT
Start: 2023-06-19 | End: 2023-07-11 | Stop reason: SDUPTHER

## 2023-07-03 PROBLEM — J01.00 ACUTE NON-RECURRENT MAXILLARY SINUSITIS: Status: RESOLVED | Noted: 2023-03-30 | Resolved: 2023-07-03

## 2023-07-09 DIAGNOSIS — Z71.89 COMPLEX CARE COORDINATION: ICD-10-CM

## 2023-07-11 ENCOUNTER — OFFICE VISIT (OUTPATIENT)
Dept: FAMILY MEDICINE | Facility: CLINIC | Age: 78
End: 2023-07-11
Payer: MEDICARE

## 2023-07-11 VITALS
BODY MASS INDEX: 32.33 KG/M2 | RESPIRATION RATE: 20 BRPM | TEMPERATURE: 99 F | HEART RATE: 73 BPM | DIASTOLIC BLOOD PRESSURE: 85 MMHG | HEIGHT: 67 IN | WEIGHT: 206 LBS | SYSTOLIC BLOOD PRESSURE: 151 MMHG | OXYGEN SATURATION: 95 %

## 2023-07-11 DIAGNOSIS — K21.9 GERD WITHOUT ESOPHAGITIS: ICD-10-CM

## 2023-07-11 DIAGNOSIS — E78.2 MIXED HYPERLIPIDEMIA: ICD-10-CM

## 2023-07-11 DIAGNOSIS — R60.9 EDEMA, UNSPECIFIED TYPE: ICD-10-CM

## 2023-07-11 DIAGNOSIS — F33.0 MILD EPISODE OF RECURRENT MAJOR DEPRESSIVE DISORDER: ICD-10-CM

## 2023-07-11 DIAGNOSIS — M16.11 PRIMARY OSTEOARTHRITIS OF RIGHT HIP: ICD-10-CM

## 2023-07-11 DIAGNOSIS — M81.0 OSTEOPOROSIS, UNSPECIFIED OSTEOPOROSIS TYPE, UNSPECIFIED PATHOLOGICAL FRACTURE PRESENCE: Primary | ICD-10-CM

## 2023-07-11 DIAGNOSIS — M54.9 BACK PAIN, UNSPECIFIED BACK LOCATION, UNSPECIFIED BACK PAIN LATERALITY, UNSPECIFIED CHRONICITY: ICD-10-CM

## 2023-07-11 DIAGNOSIS — J44.9 CHRONIC OBSTRUCTIVE PULMONARY DISEASE, UNSPECIFIED COPD TYPE: ICD-10-CM

## 2023-07-11 DIAGNOSIS — I10 ESSENTIAL HYPERTENSION: ICD-10-CM

## 2023-07-11 DIAGNOSIS — I10 HYPERTENSION, UNSPECIFIED TYPE: ICD-10-CM

## 2023-07-11 DIAGNOSIS — I82.592 CHRONIC DEEP VEIN THROMBOSIS (DVT) OF OTHER VEIN OF LEFT LOWER EXTREMITY: ICD-10-CM

## 2023-07-11 DIAGNOSIS — G25.81 RESTLESS LEGS: ICD-10-CM

## 2023-07-11 DIAGNOSIS — H65.23 BILATERAL CHRONIC SEROUS OTITIS MEDIA: ICD-10-CM

## 2023-07-11 PROCEDURE — 1101F PR PT FALLS ASSESS DOC 0-1 FALLS W/OUT INJ PAST YR: ICD-10-PCS | Mod: ,,, | Performed by: NURSE PRACTITIONER

## 2023-07-11 PROCEDURE — 1125F PR PAIN SEVERITY QUANTIFIED, PAIN PRESENT: ICD-10-PCS | Mod: ,,, | Performed by: NURSE PRACTITIONER

## 2023-07-11 PROCEDURE — 1125F AMNT PAIN NOTED PAIN PRSNT: CPT | Mod: ,,, | Performed by: NURSE PRACTITIONER

## 2023-07-11 PROCEDURE — 1101F PT FALLS ASSESS-DOCD LE1/YR: CPT | Mod: ,,, | Performed by: NURSE PRACTITIONER

## 2023-07-11 PROCEDURE — 96372 PR INJECTION,THERAP/PROPH/DIAG2ST, IM OR SUBCUT: ICD-10-PCS | Mod: ,,, | Performed by: NURSE PRACTITIONER

## 2023-07-11 PROCEDURE — 99214 OFFICE O/P EST MOD 30 MIN: CPT | Mod: 25,,, | Performed by: NURSE PRACTITIONER

## 2023-07-11 PROCEDURE — 1159F MED LIST DOCD IN RCRD: CPT | Mod: ,,, | Performed by: NURSE PRACTITIONER

## 2023-07-11 PROCEDURE — 3079F DIAST BP 80-89 MM HG: CPT | Mod: ,,, | Performed by: NURSE PRACTITIONER

## 2023-07-11 PROCEDURE — 99214 PR OFFICE/OUTPT VISIT, EST, LEVL IV, 30-39 MIN: ICD-10-PCS | Mod: 25,,, | Performed by: NURSE PRACTITIONER

## 2023-07-11 PROCEDURE — 1159F PR MEDICATION LIST DOCUMENTED IN MEDICAL RECORD: ICD-10-PCS | Mod: ,,, | Performed by: NURSE PRACTITIONER

## 2023-07-11 PROCEDURE — 3288F PR FALLS RISK ASSESSMENT DOCUMENTED: ICD-10-PCS | Mod: ,,, | Performed by: NURSE PRACTITIONER

## 2023-07-11 PROCEDURE — 3288F FALL RISK ASSESSMENT DOCD: CPT | Mod: ,,, | Performed by: NURSE PRACTITIONER

## 2023-07-11 PROCEDURE — 96372 THER/PROPH/DIAG INJ SC/IM: CPT | Mod: ,,, | Performed by: NURSE PRACTITIONER

## 2023-07-11 PROCEDURE — 3077F SYST BP >= 140 MM HG: CPT | Mod: ,,, | Performed by: NURSE PRACTITIONER

## 2023-07-11 PROCEDURE — 3077F PR MOST RECENT SYSTOLIC BLOOD PRESSURE >= 140 MM HG: ICD-10-PCS | Mod: ,,, | Performed by: NURSE PRACTITIONER

## 2023-07-11 PROCEDURE — 3079F PR MOST RECENT DIASTOLIC BLOOD PRESSURE 80-89 MM HG: ICD-10-PCS | Mod: ,,, | Performed by: NURSE PRACTITIONER

## 2023-07-11 RX ORDER — LOSARTAN POTASSIUM 100 MG/1
100 TABLET ORAL DAILY
Qty: 90 TABLET | Refills: 1 | Status: SHIPPED | OUTPATIENT
Start: 2023-07-11

## 2023-07-11 RX ORDER — ATORVASTATIN CALCIUM 40 MG/1
40 TABLET, FILM COATED ORAL DAILY
Qty: 90 TABLET | Refills: 1 | Status: ON HOLD | OUTPATIENT
Start: 2023-07-11 | End: 2023-09-27 | Stop reason: HOSPADM

## 2023-07-11 RX ORDER — PROPRANOLOL HYDROCHLORIDE 40 MG/1
40 TABLET ORAL 2 TIMES DAILY
Qty: 180 TABLET | Refills: 1 | Status: SHIPPED | OUTPATIENT
Start: 2023-07-11 | End: 2023-10-17

## 2023-07-11 RX ORDER — OMEPRAZOLE 20 MG/1
20 CAPSULE, DELAYED RELEASE ORAL DAILY
Qty: 90 CAPSULE | Refills: 1 | Status: SHIPPED | OUTPATIENT
Start: 2023-07-11

## 2023-07-11 RX ORDER — PRIMIDONE 50 MG/1
TABLET ORAL
Qty: 450 TABLET | Refills: 1 | Status: SHIPPED | OUTPATIENT
Start: 2023-07-11

## 2023-07-11 RX ORDER — AMLODIPINE BESYLATE 5 MG/1
5 TABLET ORAL 2 TIMES DAILY
Qty: 180 TABLET | Refills: 1 | Status: SHIPPED | OUTPATIENT
Start: 2023-07-11

## 2023-07-11 RX ORDER — METHYLPREDNISOLONE ACETATE 40 MG/ML
40 INJECTION, SUSPENSION INTRA-ARTICULAR; INTRALESIONAL; INTRAMUSCULAR; SOFT TISSUE
Status: COMPLETED | OUTPATIENT
Start: 2023-07-11 | End: 2023-07-11

## 2023-07-11 RX ORDER — PRAMIPEXOLE DIHYDROCHLORIDE 0.12 MG/1
0.25 TABLET ORAL NIGHTLY
Qty: 180 TABLET | Refills: 1 | Status: SHIPPED | OUTPATIENT
Start: 2023-07-11 | End: 2023-10-17

## 2023-07-11 RX ORDER — CELECOXIB 200 MG/1
200 CAPSULE ORAL DAILY
Qty: 90 CAPSULE | Refills: 1 | Status: SHIPPED | OUTPATIENT
Start: 2023-07-11 | End: 2023-09-25

## 2023-07-11 RX ORDER — DEXAMETHASONE SODIUM PHOSPHATE 4 MG/ML
4 INJECTION, SOLUTION INTRA-ARTICULAR; INTRALESIONAL; INTRAMUSCULAR; INTRAVENOUS; SOFT TISSUE
Status: COMPLETED | OUTPATIENT
Start: 2023-07-11 | End: 2023-07-11

## 2023-07-11 RX ORDER — TIZANIDINE 4 MG/1
4 TABLET ORAL 2 TIMES DAILY PRN
Qty: 60 TABLET | Refills: 2 | Status: SHIPPED | OUTPATIENT
Start: 2023-07-11

## 2023-07-11 RX ORDER — DESVENLAFAXINE 100 MG/1
100 TABLET, EXTENDED RELEASE ORAL DAILY
Qty: 90 TABLET | Refills: 1 | Status: SHIPPED | OUTPATIENT
Start: 2023-07-11

## 2023-07-11 RX ORDER — IPRATROPIUM BROMIDE 42 UG/1
2 SPRAY, METERED NASAL 4 TIMES DAILY
Qty: 15 ML | Refills: 5 | Status: SHIPPED | OUTPATIENT
Start: 2023-07-11 | End: 2023-12-13 | Stop reason: SDUPTHER

## 2023-07-11 RX ORDER — IPRATROPIUM BROMIDE AND ALBUTEROL SULFATE 2.5; .5 MG/3ML; MG/3ML
3 SOLUTION RESPIRATORY (INHALATION) EVERY 6 HOURS PRN
Qty: 100 ML | Refills: 2 | Status: SHIPPED | OUTPATIENT
Start: 2023-07-11 | End: 2024-01-11 | Stop reason: SDUPTHER

## 2023-07-11 RX ORDER — FUROSEMIDE 20 MG/1
20 TABLET ORAL DAILY
Qty: 90 TABLET | Refills: 1 | Status: SHIPPED | OUTPATIENT
Start: 2023-07-11

## 2023-07-11 RX ORDER — ALBUTEROL SULFATE 90 UG/1
1-2 AEROSOL, METERED RESPIRATORY (INHALATION) EVERY 4 HOURS PRN
Qty: 18 G | Refills: 2 | Status: SHIPPED | OUTPATIENT
Start: 2023-07-11 | End: 2023-12-11 | Stop reason: SDUPTHER

## 2023-07-11 RX ADMIN — METHYLPREDNISOLONE ACETATE 40 MG: 40 INJECTION, SUSPENSION INTRA-ARTICULAR; INTRALESIONAL; INTRAMUSCULAR; SOFT TISSUE at 02:07

## 2023-07-11 RX ADMIN — DEXAMETHASONE SODIUM PHOSPHATE 4 MG: 4 INJECTION, SOLUTION INTRA-ARTICULAR; INTRALESIONAL; INTRAMUSCULAR; INTRAVENOUS; SOFT TISSUE at 02:07

## 2023-07-11 NOTE — PROGRESS NOTES
"   Silva Peters NP   [facility]  [address] [address] [place]     PATIENT NAME: Felicia Keita  : 1945  DATE: 23  MRN: [id number]      Billing Provider: Silva Peters NP  Level of Service:   Patient PCP Information       Provider PCP Type    Silva Peters NP General            Reason for Visit / Chief Complaint: Follow-up, Hypertension, Hyperlipidemia, Depression, Health Maintenance (COVID-19 Vaccine(1)- declines/TETANUS VACCINE-declines/Shingles Vaccine(1 of 2) will obtain from pharmacy/DEXA Scan - ordered today), Medication Refill, and Rash ("Burning" red rash to bilateral lower legs)       Update PCP  Update Chief Complaint         History of Present Illness / Problem Focused Workflow     Felicia Keita presents to the clinic here for eval of htn, hyperlipidemia, depression. C/o rash and burning of janna lower legs,       Review of Systems     Review of Systems   Constitutional:  Negative for chills, fatigue and fever.   HENT:  Negative for nasal congestion, ear pain, facial swelling, hearing loss, mouth dryness, mouth sores, postnasal drip, rhinorrhea, sinus pressure/congestion and goiter.    Eyes:  Negative for discharge and itching.   Respiratory:  Negative for cough, shortness of breath and wheezing.    Cardiovascular:  Negative for chest pain and leg swelling.   Gastrointestinal:  Negative for abdominal pain, change in bowel habit and change in bowel habit.   Genitourinary:  Negative for difficulty urinating, dysuria, enuresis, frequency, hematuria and urgency.   Musculoskeletal:  Positive for back pain.   Integumentary:  Positive for rash.   Neurological:  Negative for dizziness, vertigo, syncope, weakness and headaches.   Psychiatric/Behavioral:  Negative for decreased concentration.       Medical / Social / Family History     Past Medical History:   Diagnosis Date    Abnormal gait 2013    Actinic keratosis 2020    L forearm     Allergic rhinitis 2020    Blepharophimosis " 04/22/2014    senile    Cervical somatic dysfunction 08/15/2017    Cervicalgia 03/28/2019    Chronic peripheral venous hypertension 06/16/2015    Chronic serous otitis media, bilateral 09/05/2019    Chronic venous hypertension (idiopathic) without complications of unspecified lower extremity 08/15/2017    Conjunctival hemorrhage, right eye 11/02/2020    Deep venous thrombosis of lower extremity 09/17/2012    Degeneration of cervical intervertebral disc 10/21/2011    Degeneration of lumbar intervertebral disc 10/21/2011    Degenerative joint disease involving multiple joints 07/25/2011    Depressive disorder 07/25/2011    Essential hypertension 08/12/2020    Essential tremor 06/24/2014    GERD without esophagitis 08/15/2017    Headache 04/07/2020    Hyperlipidemia 05/08/2012    Insomnia 04/06/2016    Osteoarthritis 01/04/2017    Osteoporosis 11/07/2017    Otalgia, right ear 10/17/2016    Other cervical disc degeneration, unspecified cervical region 08/15/2017    Overflow incontinence 01/06/2020    Pain in right knee 01/06/2020    osteoarthritis    Pain in right leg 09/10/2020    Peripheral arterial occlusive disease 02/24/2015    Peripheral vascular disease, unspecified 08/06/2019    Peripheral venous insufficiency 09/29/2015    Personal history of PE (pulmonary embolism) 08/06/2019    Presence of vena cava filter 03/2015    Pulmonary embolus 03/10/2015    Pulmonary infarction 03/16/2012    Pure hypercholesterolemia 07/25/2011    Restless legs 05/22/2018    Right temporomandibular joint disorder, unspecified 08/18/2020    Rosacea 07/01/2015    Sciatica, right side 07/06/2016    Seborrheic keratosis 01/07/2021    Segmental and somatic dysfunction of thoracic region 03/28/2019    Senile osteoporosis 04/24/2017    Spasm of back muscles 07/25/2011    Trochanteric bursitis of right hip 01/06/2020    Unspecified urinary incontinence 02/07/2020    Vitamin D deficiency 10/27/2014       Past Surgical History:   Procedure  Laterality Date    APPENDECTOMY      bone density  06/27/2019    Ajith/Abiel    CARPAL TUNNEL RELEASE      BLI    LUMBAR LAMINECTOMY      prolia  03/12/2019    1mg    remove cataract Right     insert lens    TONSILLECTOMY      TOTAL ABDOMINAL HYSTERECTOMY      VAGINAL DELIVERY      vascular surgery procedure       Right SSV Laser Ablation performed by Dr. Carlo hernandez screen  05/24/2018       Social History  Ms.  reports that she has never smoked. She has never used smokeless tobacco. She reports that she does not drink alcohol and does not use drugs.    Family History  Ms.'s family history includes COPD in her daughter and sister; Cancer in her brother; Emphysema in her father; Hearing loss in her father; Hypertension in her father and sister; Melanoma in her brother; Rheum arthritis in her daughter and sister; Skin cancer in her father; Stomach cancer in her sister.    Medications and Allergies     Medications  Outpatient Medications Marked as Taking for the 7/11/23 encounter (Office Visit) with Silva Peters NP   Medication Sig Dispense Refill    ascorbic acid, vitamin C, (VITAMIN C) 500 MG tablet Take 500 mg by mouth once daily.      calcium-vitamin D tablet 600 mg-200 units Take 1 tablet by mouth 2 (two) times a day.      cetirizine (ZYRTEC) 10 MG tablet Take 1 tablet (10 mg total) by mouth once daily. 30 tablet 0    denosumab (PROLIA) 60 mg/mL Syrg Inject 60 mg into the skin every 6 (six) months.      diaper,brief,adult,disposable Misc 1 each by Misc.(Non-Drug; Combo Route) route continuous prn (prn). 120 each 5    diclofenac sodium (VOLTAREN) 1 % Gel Apply 4 grams to large joints 4 times daily 450 g 1    gabapentin (NEURONTIN) 300 MG capsule TAKE 1 CAPSULE BY MOUTH IN THE MORNING, TAKE 1 CAPSULE at LUNCH, AND TAKE 2 CAPSULES AT BEDTIME 380 capsule 1    HYDROcodone-acetaminophen (NORCO)  mg per tablet 05/27/21 TAKE 1 TABLET BY MOUTH EVERY 8 HOURS AS NEEDED      pyrilam-chlophed-acetaminophen  12.5-12.5-160 mg/5 mL Liqd Take 10 mLs by mouth every 8 (eight) hours as needed (COUGH). 473 mL 0    senna-docusate 8.6-50 mg (PERICOLACE) 8.6-50 mg per tablet Take 1 tablet by mouth once daily.      zinc gluconate 50 mg tablet Take 50 mg by mouth once daily.      [DISCONTINUED] albuterol (PROVENTIL/VENTOLIN HFA) 90 mcg/actuation inhaler INHALE 1 TO 2 PUFFS INTO THE LUNGS EVERY 4 HOURS AS NEEDED FOR WHEEZING 18 g 2    [DISCONTINUED] albuterol-ipratropium (DUO-NEB) 2.5 mg-0.5 mg/3 mL nebulizer solution Take 3 mLs by nebulization every 6 (six) hours as needed for Wheezing. Rescue 75 mL 0    [DISCONTINUED] amLODIPine (NORVASC) 5 MG tablet Take 1 tablet (5 mg total) by mouth 2 (two) times daily. 180 tablet 1    [DISCONTINUED] atorvastatin (LIPITOR) 40 MG tablet Take 1 tablet (40 mg total) by mouth once daily. 90 tablet 0    [DISCONTINUED] celecoxib (CELEBREX) 200 MG capsule Take 1 capsule (200 mg total) by mouth once daily. 90 capsule 0    [DISCONTINUED] desvenlafaxine succinate (PRISTIQ) 100 MG Tb24 Take 1 tablet (100 mg total) by mouth once daily. 90 tablet 1    [DISCONTINUED] furosemide (LASIX) 20 MG tablet Take 1 tablet (20 mg total) by mouth once daily. 90 tablet 1    [DISCONTINUED] ipratropium (ATROVENT) 42 mcg (0.06 %) nasal spray 2 sprays by Each Nostril route 4 (four) times daily. 15 mL 5    [DISCONTINUED] losartan (COZAAR) 100 MG tablet Take 1 tablet (100 mg total) by mouth once daily. 90 tablet 1    [DISCONTINUED] omeprazole (PRILOSEC) 20 MG capsule Take 1 capsule (20 mg total) by mouth once daily. 90 capsule 1    [DISCONTINUED] pramipexole (MIRAPEX) 0.125 MG tablet Take 2 tablets (0.25 mg total) by mouth nightly. 180 tablet 1    [DISCONTINUED] primidone (MYSOLINE) 50 MG Tab TAKE 2 TABLETS BY MOUTH EVERY MORNING AND TAKE 3 TABLETS BY MOUTH EVERY EVENING 450 tablet 1    [DISCONTINUED] propranoloL (INDERAL) 40 MG tablet Take 1 tablet (40 mg total) by mouth 2 (two) times daily. 180 tablet 1    [DISCONTINUED]  "rivaroxaban (XARELTO) 20 mg Tab Take 1 tablet (20 mg total) by mouth once daily. 90 tablet 1    [DISCONTINUED] tiZANidine (ZANAFLEX) 4 MG tablet Take 1 tablet (4 mg total) by mouth 2 (two) times daily as needed (muscle spasms). 60 tablet 2     Current Facility-Administered Medications for the 7/11/23 encounter (Office Visit) with Silva Peters NP   Medication Dose Route Frequency Provider Last Rate Last Admin    [COMPLETED] dexAMETHasone injection 4 mg  4 mg Intramuscular 1 time in Clinic/HOD Silva Peters NP   4 mg at 07/11/23 1429    [COMPLETED] methylPREDNISolone acetate injection 40 mg  40 mg Intramuscular 1 time in Clinic/HOD Silva Peters NP   40 mg at 07/11/23 1430       Allergies  Review of patient's allergies indicates:  No Known Allergies    Physical Examination     Vitals:    07/11/23 1344   BP: (!) 151/85   BP Location: Right arm   Patient Position: Sitting   Pulse: 73   Resp: 20   Temp: 98.7 °F (37.1 °C)   TempSrc: Oral   SpO2: 95%   Weight: 93.4 kg (206 lb)   Height: 5' 7" (1.702 m)      Physical Exam  Vitals and nursing note reviewed.   Constitutional:       Appearance: Normal appearance.   HENT:      Head: Normocephalic.      Right Ear: Tympanic membrane, ear canal and external ear normal.      Left Ear: Tympanic membrane, ear canal and external ear normal.      Nose: Nose normal.      Mouth/Throat:      Mouth: Mucous membranes are moist.      Pharynx: Oropharynx is clear.   Eyes:      Extraocular Movements: Extraocular movements intact.      Conjunctiva/sclera: Conjunctivae normal.      Pupils: Pupils are equal, round, and reactive to light.   Cardiovascular:      Rate and Rhythm: Normal rate and regular rhythm.      Pulses: Normal pulses.      Heart sounds: Normal heart sounds.   Pulmonary:      Effort: Pulmonary effort is normal.      Breath sounds: Normal breath sounds.   Abdominal:      General: Bowel sounds are normal.      Palpations: Abdomen is soft.   Musculoskeletal:         General: " Tenderness (c/o pain of lower back, chronic pain) present. Normal range of motion.      Cervical back: Normal range of motion and neck supple.   Skin:     General: Skin is warm and dry.      Capillary Refill: Capillary refill takes less than 2 seconds.      Findings: Rash (erythematous area on lower legs, pt refuses to have doppler studies done at this time) present.   Neurological:      General: No focal deficit present.      Mental Status: She is alert and oriented to person, place, and time.   Psychiatric:         Mood and Affect: Mood normal.         Behavior: Behavior normal.        Assessment and Plan (including Health Maintenance)      Problem List  Smart Sets  Document Outside HM   :    Plan:     Osteoporosis, unspecified osteoporosis type, unspecified pathological fracture presence  -     DXA Bone Density Axial Skeleton 1 or more sites; Future; Expected date: 07/11/2023    Chronic obstructive pulmonary disease, unspecified COPD type  -     albuterol (PROVENTIL/VENTOLIN HFA) 90 mcg/actuation inhaler; Inhale 1-2 puffs into the lungs every 4 (four) hours as needed for Wheezing. Rescue  Dispense: 18 g; Refill: 2  -     albuterol-ipratropium (DUO-NEB) 2.5 mg-0.5 mg/3 mL nebulizer solution; Take 3 mLs by nebulization every 6 (six) hours as needed for Wheezing. Rescue  Dispense: 100 mL; Refill: 2    Hypertension, unspecified type  -     amLODIPine (NORVASC) 5 MG tablet; Take 1 tablet (5 mg total) by mouth 2 (two) times daily.  Dispense: 180 tablet; Refill: 1  -     CBC Auto Differential; Future; Expected date: 07/11/2023  -     Comprehensive Metabolic Panel; Future; Expected date: 07/11/2023  -     Lipid Panel; Future; Expected date: 07/11/2023  -     CK; Future; Expected date: 07/11/2023  -     Microalbumin/Creatinine Ratio, Urine; Future; Expected date: 07/11/2023    Mixed hyperlipidemia  -     atorvastatin (LIPITOR) 40 MG tablet; Take 1 tablet (40 mg total) by mouth once daily.  Dispense: 90 tablet; Refill:  1  -     CBC Auto Differential; Future; Expected date: 07/11/2023  -     Comprehensive Metabolic Panel; Future; Expected date: 07/11/2023  -     Lipid Panel; Future; Expected date: 07/11/2023  -     CK; Future; Expected date: 07/11/2023  -     Microalbumin/Creatinine Ratio, Urine; Future; Expected date: 07/11/2023    Primary osteoarthritis of right hip  -     celecoxib (CELEBREX) 200 MG capsule; Take 1 capsule (200 mg total) by mouth once daily.  Dispense: 90 capsule; Refill: 1  -     ipratropium (ATROVENT) 42 mcg (0.06 %) nasal spray; 2 sprays by Each Nostril route 4 (four) times daily.  Dispense: 15 mL; Refill: 5    Edema, unspecified type  -     furosemide (LASIX) 20 MG tablet; Take 1 tablet (20 mg total) by mouth once daily.  Dispense: 90 tablet; Refill: 1    Mild episode of recurrent major depressive disorder  -     desvenlafaxine succinate (PRISTIQ) 100 MG Tb24; Take 1 tablet (100 mg total) by mouth once daily.  Dispense: 90 tablet; Refill: 1    Bilateral chronic serous otitis media  -     ipratropium (ATROVENT) 42 mcg (0.06 %) nasal spray; 2 sprays by Each Nostril route 4 (four) times daily.  Dispense: 15 mL; Refill: 5    Essential hypertension  -     losartan (COZAAR) 100 MG tablet; Take 1 tablet (100 mg total) by mouth once daily.  Dispense: 90 tablet; Refill: 1  -     propranoloL (INDERAL) 40 MG tablet; Take 1 tablet (40 mg total) by mouth 2 (two) times daily.  Dispense: 180 tablet; Refill: 1    GERD without esophagitis  -     omeprazole (PRILOSEC) 20 MG capsule; Take 1 capsule (20 mg total) by mouth once daily.  Dispense: 90 capsule; Refill: 1    Restless legs  -     pramipexole (MIRAPEX) 0.125 MG tablet; Take 2 tablets (0.25 mg total) by mouth nightly.  Dispense: 180 tablet; Refill: 1  -     primidone (MYSOLINE) 50 MG Tab; TAKE 2 TABLETS BY MOUTH EVERY MORNING AND TAKE 3 TABLETS BY MOUTH EVERY EVENING  Dispense: 450 tablet; Refill: 1    Chronic deep vein thrombosis (DVT) of other vein of left lower  extremity  -     rivaroxaban (XARELTO) 20 mg Tab; Take 1 tablet (20 mg total) by mouth once daily.  Dispense: 90 tablet; Refill: 1    Back pain, unspecified back location, unspecified back pain laterality, unspecified chronicity  -     tiZANidine (ZANAFLEX) 4 MG tablet; Take 1 tablet (4 mg total) by mouth 2 (two) times daily as needed (muscle spasms).  Dispense: 60 tablet; Refill: 2  -     dexAMETHasone injection 4 mg  -     methylPREDNISolone acetate injection 40 mg            Health Maintenance Due   Topic Date Due    COVID-19 Vaccine (1) Never done    TETANUS VACCINE  Never done    Shingles Vaccine (1 of 2) Never done    DEXA Scan  07/01/2023       Problem List Items Addressed This Visit          Neuro    Restless legs    Current Assessment & Plan     meds as ordered         Relevant Medications    pramipexole (MIRAPEX) 0.125 MG tablet    primidone (MYSOLINE) 50 MG Tab       Psychiatric    Depression    Current Assessment & Plan     The current medical regimen is effective;  continue present plan and medications. m         Relevant Medications    desvenlafaxine succinate (PRISTIQ) 100 MG Tb24       Pulmonary    Chronic obstructive pulmonary disease    Current Assessment & Plan     Avoid irritnats, meds as ordered, return to clinic as needed         Relevant Medications    albuterol (PROVENTIL/VENTOLIN HFA) 90 mcg/actuation inhaler    albuterol-ipratropium (DUO-NEB) 2.5 mg-0.5 mg/3 mL nebulizer solution       Cardiac/Vascular    Essential hypertension    Current Assessment & Plan     meds as ordered,d return to clnci as needed         Relevant Medications    losartan (COZAAR) 100 MG tablet    propranoloL (INDERAL) 40 MG tablet    Hyperlipidemia    Current Assessment & Plan     Low chol diet, meds asordered, return emi linci as needed         Relevant Medications    atorvastatin (LIPITOR) 40 MG tablet    Other Relevant Orders    CBC Auto Differential    Comprehensive Metabolic Panel    Lipid Panel    CK     Microalbumin/Creatinine Ratio, Urine       GI    GERD without esophagitis    Current Assessment & Plan     Elevate Head of bed  Do not eat at least 2 hours before bedtime  Avoid caffeine, nicotine, etoh, and spicy foods  Eat small frequent feedings throughout the day,   Meds as ordered,  Return to clinic as needed             Relevant Medications    omeprazole (PRILOSEC) 20 MG capsule       Orthopedic    Back pain    Current Assessment & Plan     Keep all tonya at pain clinic         Relevant Medications    tiZANidine (ZANAFLEX) 4 MG tablet    dexAMETHasone injection 4 mg (Completed)    methylPREDNISolone acetate injection 40 mg (Completed)    Osteoporosis - Primary    Current Assessment & Plan     Cont all meds as odrered         Relevant Orders    DXA Bone Density Axial Skeleton 1 or more sites    Osteoarthritis    Current Assessment & Plan     meds as ordered, return to clinic asneeded         Relevant Medications    celecoxib (CELEBREX) 200 MG capsule    ipratropium (ATROVENT) 42 mcg (0.06 %) nasal spray     Other Visit Diagnoses       Hypertension, unspecified type        Relevant Medications    amLODIPine (NORVASC) 5 MG tablet    Other Relevant Orders    CBC Auto Differential    Comprehensive Metabolic Panel    Lipid Panel    CK    Microalbumin/Creatinine Ratio, Urine    Edema, unspecified type        Relevant Medications    furosemide (LASIX) 20 MG tablet    Bilateral chronic serous otitis media        Relevant Medications    ipratropium (ATROVENT) 42 mcg (0.06 %) nasal spray    Chronic deep vein thrombosis (DVT) of other vein of left lower extremity        Relevant Medications    rivaroxaban (XARELTO) 20 mg Tab              Health Maintenance Topics with due status: Not Due       Topic Last Completion Date    Lipid Panel 07/20/2022    Influenza Vaccine 03/30/2023       Procedures     Future Appointments   Date Time Provider Department Center   10/11/2023  2:30 PM Silva Peters NP [facility]    12/4/2023 10:30 AM [facility]        Follow up in about 3 months (around 10/11/2023).       Signature:  Silva Peters NP    Date of encounter: 7/11/23

## 2023-07-11 NOTE — Clinical Note
COVID-19 Vaccine(1)- declines TETANUS VACCINE-declines Shingles Vaccine(1 of 2) will obtain from pharmacy DEXA Scan - ordered today

## 2023-07-27 ENCOUNTER — HOSPITAL ENCOUNTER (OUTPATIENT)
Dept: RADIOLOGY | Facility: HOSPITAL | Age: 78
Discharge: HOME | End: 2023-07-27
Attending: NURSE PRACTITIONER
Payer: MEDICARE

## 2023-07-27 DIAGNOSIS — M81.0 OSTEOPOROSIS, UNSPECIFIED OSTEOPOROSIS TYPE, UNSPECIFIED PATHOLOGICAL FRACTURE PRESENCE: ICD-10-CM

## 2023-07-27 PROCEDURE — 77080 DXA BONE DENSITY AXIAL: CPT | Mod: TC

## 2023-08-18 DIAGNOSIS — M16.11 PRIMARY OSTEOARTHRITIS OF RIGHT HIP: ICD-10-CM

## 2023-08-18 RX ORDER — DICLOFENAC SODIUM 10 MG/G
GEL TOPICAL
Qty: 450 G | Refills: 1 | Status: SHIPPED | OUTPATIENT
Start: 2023-08-18 | End: 2023-10-13 | Stop reason: SDUPTHER

## 2023-09-25 ENCOUNTER — HOSPITAL ENCOUNTER (INPATIENT)
Facility: HOSPITAL | Age: 78
LOS: 2 days | Discharge: HOME HEALTH | DRG: 065 | End: 2023-09-27
Attending: FAMILY MEDICINE | Admitting: INTERNAL MEDICINE
Payer: MEDICARE

## 2023-09-25 ENCOUNTER — OFFICE VISIT (OUTPATIENT)
Dept: FAMILY MEDICINE | Facility: CLINIC | Age: 78
End: 2023-09-25
Payer: MEDICARE

## 2023-09-25 VITALS
RESPIRATION RATE: 18 BRPM | TEMPERATURE: 98 F | DIASTOLIC BLOOD PRESSURE: 90 MMHG | WEIGHT: 206 LBS | BODY MASS INDEX: 32.33 KG/M2 | HEART RATE: 78 BPM | SYSTOLIC BLOOD PRESSURE: 140 MMHG | HEIGHT: 67 IN | OXYGEN SATURATION: 98 %

## 2023-09-25 DIAGNOSIS — I10 UNCONTROLLED HYPERTENSION: ICD-10-CM

## 2023-09-25 DIAGNOSIS — I63.9 STROKE: ICD-10-CM

## 2023-09-25 DIAGNOSIS — R42 DIZZINESS: Primary | ICD-10-CM

## 2023-09-25 DIAGNOSIS — R47.81 SLURRED SPEECH: ICD-10-CM

## 2023-09-25 DIAGNOSIS — I63.9 ACUTE ISCHEMIC STROKE: Primary | ICD-10-CM

## 2023-09-25 DIAGNOSIS — K11.8 NODULE OF PAROTID GLAND: ICD-10-CM

## 2023-09-25 DIAGNOSIS — I63.9 ACUTE CVA (CEREBROVASCULAR ACCIDENT): ICD-10-CM

## 2023-09-25 PROBLEM — G25.0 ESSENTIAL TREMOR: Status: ACTIVE | Noted: 2023-09-25

## 2023-09-25 PROBLEM — I26.99 RECURRENT PULMONARY EMBOLI: Status: ACTIVE | Noted: 2023-09-25

## 2023-09-25 PROBLEM — I82.409 RECURRENT DEEP VEIN THROMBOSIS (DVT): Status: ACTIVE | Noted: 2023-09-25

## 2023-09-25 PROBLEM — G89.4 CHRONIC PAIN SYNDROME: Status: ACTIVE | Noted: 2023-09-25

## 2023-09-25 PROBLEM — I63.412 CEREBRAL INFARCTION DUE TO EMBOLISM OF LEFT MIDDLE CEREBRAL ARTERY: Status: ACTIVE | Noted: 2023-09-25

## 2023-09-25 PROBLEM — I63.411 CEREBRAL INFARCTION DUE TO EMBOLISM OF RIGHT MIDDLE CEREBRAL ARTERY: Status: ACTIVE | Noted: 2023-09-25

## 2023-09-25 LAB
ALBUMIN SERPL BCP-MCNC: 4 G/DL (ref 3.5–5)
ALBUMIN/GLOB SERPL: 1.2 {RATIO}
ALP SERPL-CCNC: 99 U/L (ref 55–142)
ALT SERPL W P-5'-P-CCNC: 29 U/L (ref 13–56)
ANION GAP SERPL CALCULATED.3IONS-SCNC: 13 MMOL/L (ref 7–16)
AST SERPL W P-5'-P-CCNC: 28 U/L (ref 15–37)
BASOPHILS # BLD AUTO: 0.08 K/UL (ref 0–0.2)
BASOPHILS NFR BLD AUTO: 1.2 % (ref 0–1)
BILIRUB SERPL-MCNC: 0.4 MG/DL (ref ?–1.2)
BILIRUB SERPL-MCNC: NEGATIVE MG/DL
BILIRUB UR QL STRIP: NEGATIVE
BLOOD URINE, POC: NEGATIVE
BUN SERPL-MCNC: 8 MG/DL (ref 7–18)
BUN/CREAT SERPL: 9 (ref 6–20)
CALCIUM SERPL-MCNC: 9.2 MG/DL (ref 8.5–10.1)
CHLORIDE SERPL-SCNC: 96 MMOL/L (ref 98–107)
CLARITY UR: CLEAR
CO2 SERPL-SCNC: 29 MMOL/L (ref 21–32)
COLOR UR: COLORLESS
COLOR, POC UA: YELLOW
CREAT SERPL-MCNC: 0.85 MG/DL (ref 0.55–1.02)
DIFFERENTIAL METHOD BLD: ABNORMAL
EGFR (NO RACE VARIABLE) (RUSH/TITUS): 70 ML/MIN/1.73M2
EOSINOPHIL # BLD AUTO: 0.1 K/UL (ref 0–0.5)
EOSINOPHIL NFR BLD AUTO: 1.6 % (ref 1–4)
ERYTHROCYTE [DISTWIDTH] IN BLOOD BY AUTOMATED COUNT: 15 % (ref 11.5–14.5)
GLOBULIN SER-MCNC: 3.3 G/DL (ref 2–4)
GLUCOSE SERPL-MCNC: 105 MG/DL (ref 70–105)
GLUCOSE SERPL-MCNC: 116 MG/DL (ref 74–106)
GLUCOSE UR QL STRIP: NEGATIVE
GLUCOSE UR STRIP-MCNC: NORMAL MG/DL
HCT VFR BLD AUTO: 41.1 % (ref 38–47)
HGB BLD-MCNC: 13.7 G/DL (ref 12–16)
IMM GRANULOCYTES # BLD AUTO: 0.02 K/UL (ref 0–0.04)
IMM GRANULOCYTES NFR BLD: 0.3 % (ref 0–0.4)
KETONES UR QL STRIP: NEGATIVE
KETONES UR STRIP-SCNC: NEGATIVE MG/DL
LEUKOCYTE ESTERASE UR QL STRIP: NEGATIVE
LEUKOCYTE ESTERASE URINE, POC: NEGATIVE
LYMPHOCYTES # BLD AUTO: 1.36 K/UL (ref 1–4.8)
LYMPHOCYTES NFR BLD AUTO: 21.2 % (ref 27–41)
MCH RBC QN AUTO: 27.8 PG (ref 27–31)
MCHC RBC AUTO-ENTMCNC: 33.3 G/DL (ref 32–36)
MCV RBC AUTO: 83.4 FL (ref 80–96)
MONOCYTES # BLD AUTO: 0.51 K/UL (ref 0–0.8)
MONOCYTES NFR BLD AUTO: 7.9 % (ref 2–6)
MPC BLD CALC-MCNC: 11 FL (ref 9.4–12.4)
NEUTROPHILS # BLD AUTO: 4.36 K/UL (ref 1.8–7.7)
NEUTROPHILS NFR BLD AUTO: 67.8 % (ref 53–65)
NITRITE UR QL STRIP: NEGATIVE
NITRITE, POC UA: NEGA TIVE
NRBC # BLD AUTO: 0 X10E3/UL
NRBC, AUTO (.00): 0 %
PH UR STRIP: 7.5 PH UNITS
PH, POC UA: 7
PLATELET # BLD AUTO: 288 K/UL (ref 150–400)
POTASSIUM SERPL-SCNC: 4.5 MMOL/L (ref 3.5–5.1)
PROT SERPL-MCNC: 7.3 G/DL (ref 6.4–8.2)
PROT UR QL STRIP: NEGATIVE
PROTEIN, POC: NEGATIVE
RBC # BLD AUTO: 4.93 M/UL (ref 4.2–5.4)
RBC # UR STRIP: NEGATIVE /UL
SODIUM SERPL-SCNC: 133 MMOL/L (ref 136–145)
SP GR UR STRIP: 1.01
SPECIFIC GRAVITY, POC UA: 1.02
TROPONIN I SERPL DL<=0.01 NG/ML-MCNC: 14.6 PG/ML
TSH SERPL DL<=0.005 MIU/L-ACNC: 1.98 UIU/ML (ref 0.36–3.74)
UROBILINOGEN UR STRIP-ACNC: NORMAL MG/DL
UROBILINOGEN, POC UA: 0.2
WBC # BLD AUTO: 6.43 K/UL (ref 4.5–11)

## 2023-09-25 PROCEDURE — 1101F PT FALLS ASSESS-DOCD LE1/YR: CPT | Mod: ,,, | Performed by: FAMILY MEDICINE

## 2023-09-25 PROCEDURE — G0426 INPT/ED TELECONSULT50: HCPCS | Mod: 95,,, | Performed by: STUDENT IN AN ORGANIZED HEALTH CARE EDUCATION/TRAINING PROGRAM

## 2023-09-25 PROCEDURE — 99285 EMERGENCY DEPT VISIT HI MDM: CPT | Mod: 25

## 2023-09-25 PROCEDURE — 1125F PR PAIN SEVERITY QUANTIFIED, PAIN PRESENT: ICD-10-PCS | Mod: ,,, | Performed by: FAMILY MEDICINE

## 2023-09-25 PROCEDURE — 1159F PR MEDICATION LIST DOCUMENTED IN MEDICAL RECORD: ICD-10-PCS | Mod: ,,, | Performed by: FAMILY MEDICINE

## 2023-09-25 PROCEDURE — 80053 COMPREHEN METABOLIC PANEL: CPT | Performed by: FAMILY MEDICINE

## 2023-09-25 PROCEDURE — 3077F SYST BP >= 140 MM HG: CPT | Mod: ,,, | Performed by: FAMILY MEDICINE

## 2023-09-25 PROCEDURE — 85025 COMPLETE CBC W/AUTO DIFF WBC: CPT | Performed by: FAMILY MEDICINE

## 2023-09-25 PROCEDURE — 3288F FALL RISK ASSESSMENT DOCD: CPT | Mod: ,,, | Performed by: FAMILY MEDICINE

## 2023-09-25 PROCEDURE — 11000001 HC ACUTE MED/SURG PRIVATE ROOM

## 2023-09-25 PROCEDURE — 3077F PR MOST RECENT SYSTOLIC BLOOD PRESSURE >= 140 MM HG: ICD-10-PCS | Mod: ,,, | Performed by: FAMILY MEDICINE

## 2023-09-25 PROCEDURE — 99223 PR INITIAL HOSPITAL CARE,LEVL III: ICD-10-PCS | Mod: AI,,, | Performed by: INTERNAL MEDICINE

## 2023-09-25 PROCEDURE — 81003 URINALYSIS AUTO W/O SCOPE: CPT | Mod: QW,,, | Performed by: FAMILY MEDICINE

## 2023-09-25 PROCEDURE — 99212 OFFICE O/P EST SF 10 MIN: CPT | Mod: ,,, | Performed by: FAMILY MEDICINE

## 2023-09-25 PROCEDURE — 84443 ASSAY THYROID STIM HORMONE: CPT | Performed by: INTERNAL MEDICINE

## 2023-09-25 PROCEDURE — 3080F PR MOST RECENT DIASTOLIC BLOOD PRESSURE >= 90 MM HG: ICD-10-PCS | Mod: ,,, | Performed by: FAMILY MEDICINE

## 2023-09-25 PROCEDURE — 81003 POCT URINALYSIS W/O SCOPE: ICD-10-PCS | Mod: QW,,, | Performed by: FAMILY MEDICINE

## 2023-09-25 PROCEDURE — 84484 ASSAY OF TROPONIN QUANT: CPT | Performed by: FAMILY MEDICINE

## 2023-09-25 PROCEDURE — 99285 EMERGENCY DEPT VISIT HI MDM: CPT | Mod: ,,, | Performed by: FAMILY MEDICINE

## 2023-09-25 PROCEDURE — 3080F DIAST BP >= 90 MM HG: CPT | Mod: ,,, | Performed by: FAMILY MEDICINE

## 2023-09-25 PROCEDURE — 1125F AMNT PAIN NOTED PAIN PRSNT: CPT | Mod: ,,, | Performed by: FAMILY MEDICINE

## 2023-09-25 PROCEDURE — 82962 GLUCOSE BLOOD TEST: CPT

## 2023-09-25 PROCEDURE — 25000003 PHARM REV CODE 250: Performed by: INTERNAL MEDICINE

## 2023-09-25 PROCEDURE — 25500020 PHARM REV CODE 255: Performed by: FAMILY MEDICINE

## 2023-09-25 PROCEDURE — 99285 PR EMERGENCY DEPT VISIT,LEVEL V: ICD-10-PCS | Mod: ,,, | Performed by: FAMILY MEDICINE

## 2023-09-25 PROCEDURE — 81003 URINALYSIS AUTO W/O SCOPE: CPT | Performed by: FAMILY MEDICINE

## 2023-09-25 PROCEDURE — 99223 1ST HOSP IP/OBS HIGH 75: CPT | Mod: AI,,, | Performed by: INTERNAL MEDICINE

## 2023-09-25 PROCEDURE — 1101F PR PT FALLS ASSESS DOC 0-1 FALLS W/OUT INJ PAST YR: ICD-10-PCS | Mod: ,,, | Performed by: FAMILY MEDICINE

## 2023-09-25 PROCEDURE — 99212 PR OFFICE/OUTPT VISIT, EST, LEVL II, 10-19 MIN: ICD-10-PCS | Mod: ,,, | Performed by: FAMILY MEDICINE

## 2023-09-25 PROCEDURE — 3288F PR FALLS RISK ASSESSMENT DOCUMENTED: ICD-10-PCS | Mod: ,,, | Performed by: FAMILY MEDICINE

## 2023-09-25 PROCEDURE — G0426 PR INPT TELEHEALTH CONSULT 50M: ICD-10-PCS | Mod: 95,,, | Performed by: STUDENT IN AN ORGANIZED HEALTH CARE EDUCATION/TRAINING PROGRAM

## 2023-09-25 PROCEDURE — 1159F MED LIST DOCD IN RCRD: CPT | Mod: ,,, | Performed by: FAMILY MEDICINE

## 2023-09-25 RX ORDER — TIZANIDINE 4 MG/1
4 TABLET ORAL 2 TIMES DAILY PRN
Status: DISCONTINUED | OUTPATIENT
Start: 2023-09-25 | End: 2023-09-27 | Stop reason: HOSPADM

## 2023-09-25 RX ORDER — PANTOPRAZOLE SODIUM 40 MG/1
40 TABLET, DELAYED RELEASE ORAL DAILY
Status: DISCONTINUED | OUTPATIENT
Start: 2023-09-25 | End: 2023-09-27 | Stop reason: HOSPADM

## 2023-09-25 RX ORDER — ALBUTEROL SULFATE 0.83 MG/ML
2.5 SOLUTION RESPIRATORY (INHALATION) EVERY 4 HOURS PRN
Status: DISCONTINUED | OUTPATIENT
Start: 2023-09-25 | End: 2023-09-27 | Stop reason: HOSPADM

## 2023-09-25 RX ORDER — ONDANSETRON 2 MG/ML
4 INJECTION INTRAMUSCULAR; INTRAVENOUS EVERY 8 HOURS PRN
Status: DISCONTINUED | OUTPATIENT
Start: 2023-09-25 | End: 2023-09-27 | Stop reason: HOSPADM

## 2023-09-25 RX ORDER — PROPRANOLOL HYDROCHLORIDE 10 MG/1
40 TABLET ORAL 2 TIMES DAILY
Status: DISCONTINUED | OUTPATIENT
Start: 2023-09-25 | End: 2023-09-27 | Stop reason: HOSPADM

## 2023-09-25 RX ORDER — DESVENLAFAXINE SUCCINATE 50 MG/1
100 TABLET, EXTENDED RELEASE ORAL DAILY
Status: DISCONTINUED | OUTPATIENT
Start: 2023-09-25 | End: 2023-09-27 | Stop reason: HOSPADM

## 2023-09-25 RX ORDER — HYDROCODONE BITARTRATE AND ACETAMINOPHEN 10; 325 MG/1; MG/1
1 TABLET ORAL EVERY 8 HOURS PRN
Status: DISCONTINUED | OUTPATIENT
Start: 2023-09-25 | End: 2023-09-27 | Stop reason: HOSPADM

## 2023-09-25 RX ORDER — PRAMIPEXOLE DIHYDROCHLORIDE 0.25 MG/1
0.25 TABLET ORAL NIGHTLY
Status: DISCONTINUED | OUTPATIENT
Start: 2023-09-25 | End: 2023-09-27 | Stop reason: HOSPADM

## 2023-09-25 RX ORDER — POLYETHYLENE GLYCOL 3350 17 G/17G
17 POWDER, FOR SOLUTION ORAL 2 TIMES DAILY PRN
Status: DISCONTINUED | OUTPATIENT
Start: 2023-09-25 | End: 2023-09-27 | Stop reason: HOSPADM

## 2023-09-25 RX ORDER — ATORVASTATIN CALCIUM 80 MG/1
80 TABLET, FILM COATED ORAL DAILY
Status: DISCONTINUED | OUTPATIENT
Start: 2023-09-25 | End: 2023-09-27 | Stop reason: HOSPADM

## 2023-09-25 RX ORDER — ASCORBIC ACID 500 MG
500 TABLET ORAL DAILY
Status: DISCONTINUED | OUTPATIENT
Start: 2023-09-25 | End: 2023-09-27 | Stop reason: HOSPADM

## 2023-09-25 RX ORDER — LABETALOL HYDROCHLORIDE 5 MG/ML
10 INJECTION, SOLUTION INTRAVENOUS
Status: DISCONTINUED | OUTPATIENT
Start: 2023-09-25 | End: 2023-09-27 | Stop reason: HOSPADM

## 2023-09-25 RX ORDER — PRIMIDONE 50 MG/1
150 TABLET ORAL NIGHTLY
Status: DISCONTINUED | OUTPATIENT
Start: 2023-09-25 | End: 2023-09-27 | Stop reason: HOSPADM

## 2023-09-25 RX ORDER — GABAPENTIN 300 MG/1
600 CAPSULE ORAL NIGHTLY
Status: DISCONTINUED | OUTPATIENT
Start: 2023-09-25 | End: 2023-09-27 | Stop reason: HOSPADM

## 2023-09-25 RX ORDER — ALBUTEROL SULFATE 90 UG/1
2 AEROSOL, METERED RESPIRATORY (INHALATION) EVERY 4 HOURS PRN
Status: DISCONTINUED | OUTPATIENT
Start: 2023-09-25 | End: 2023-09-25

## 2023-09-25 RX ORDER — PRIMIDONE 50 MG/1
100 TABLET ORAL DAILY
Status: DISCONTINUED | OUTPATIENT
Start: 2023-09-25 | End: 2023-09-27 | Stop reason: HOSPADM

## 2023-09-25 RX ORDER — GABAPENTIN 300 MG/1
300 CAPSULE ORAL DAILY
Status: DISCONTINUED | OUTPATIENT
Start: 2023-09-25 | End: 2023-09-27 | Stop reason: HOSPADM

## 2023-09-25 RX ORDER — AMOXICILLIN 250 MG
1 CAPSULE ORAL DAILY
Status: DISCONTINUED | OUTPATIENT
Start: 2023-09-25 | End: 2023-09-27 | Stop reason: HOSPADM

## 2023-09-25 RX ORDER — SODIUM CHLORIDE 0.9 % (FLUSH) 0.9 %
10 SYRINGE (ML) INJECTION
Status: DISCONTINUED | OUTPATIENT
Start: 2023-09-25 | End: 2023-09-27 | Stop reason: HOSPADM

## 2023-09-25 RX ORDER — ASPIRIN 81 MG/1
81 TABLET ORAL DAILY
Status: DISCONTINUED | OUTPATIENT
Start: 2023-09-26 | End: 2023-09-27 | Stop reason: HOSPADM

## 2023-09-25 RX ADMIN — PRAMIPEXOLE DIHYDROCHLORIDE 0.25 MG: 0.25 TABLET ORAL at 08:09

## 2023-09-25 RX ADMIN — SENNOSIDES AND DOCUSATE SODIUM 1 TABLET: 50; 8.6 TABLET ORAL at 04:09

## 2023-09-25 RX ADMIN — HYDROCODONE BITARTRATE AND ACETAMINOPHEN 1 TABLET: 10; 325 TABLET ORAL at 11:09

## 2023-09-25 RX ADMIN — IOPAMIDOL 100 ML: 755 INJECTION, SOLUTION INTRAVENOUS at 12:09

## 2023-09-25 RX ADMIN — PROPRANOLOL HYDROCHLORIDE 40 MG: 40 TABLET ORAL at 08:09

## 2023-09-25 RX ADMIN — GABAPENTIN 300 MG: 300 CAPSULE ORAL at 04:09

## 2023-09-25 RX ADMIN — OXYCODONE HYDROCHLORIDE AND ACETAMINOPHEN 500 MG: 500 TABLET ORAL at 04:09

## 2023-09-25 RX ADMIN — PANTOPRAZOLE SODIUM 40 MG: 40 TABLET, DELAYED RELEASE ORAL at 04:09

## 2023-09-25 RX ADMIN — ATORVASTATIN CALCIUM 80 MG: 80 TABLET, FILM COATED ORAL at 04:09

## 2023-09-25 RX ADMIN — DESVENLAFAXINE 100 MG: 50 TABLET, FILM COATED, EXTENDED RELEASE ORAL at 04:09

## 2023-09-25 RX ADMIN — RIVAROXABAN 20 MG: 20 TABLET, FILM COATED ORAL at 04:09

## 2023-09-25 RX ADMIN — TIZANIDINE 4 MG: 4 TABLET ORAL at 08:09

## 2023-09-25 RX ADMIN — PRIMIDONE 150 MG: 50 TABLET ORAL at 08:09

## 2023-09-25 RX ADMIN — HYDROCODONE BITARTRATE AND ACETAMINOPHEN 1 TABLET: 10; 325 TABLET ORAL at 04:09

## 2023-09-25 RX ADMIN — PRIMIDONE 100 MG: 50 TABLET ORAL at 04:09

## 2023-09-25 RX ADMIN — GABAPENTIN 600 MG: 300 CAPSULE ORAL at 08:09

## 2023-09-25 NOTE — HPI
Ms. 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 Per patient, she was in usual state around 5PM yesterday and then suddenly she noted to have work finding difficulty associated with unsteady gait. Her speech was slurred and she felt right leg heavier with numbness. Her symptoms persisted through the evening but she did not seek medical attention thinking that this will get better. Next day (which is today), she woke up with slightly worsening of her speech and her weakness and unsteady gait persisted so she presented to ED. No reports of head trauma, seizure like activity, fever, chills. She denies any right arm weakness. Her speech has slightly improved since she has been in the ED. She reports a history of mini-stroke a few years ago without residual deficits. She is very functional at baseline, she drives and takes care of her daughter who has history of aneurysm and strokes.

## 2023-09-25 NOTE — PLAN OF CARE
SS consulted on pt for rehab. Pt admitted today. Pt will need PT/OT eval on pt before precert can be started. SS will obtain a choice in am.

## 2023-09-25 NOTE — CONSULTS
[facility]  Vascular Neurology  Comprehensive Stroke Center  TeleVascular Neurology Acute Consultation Note      Consult to Telemedicine - Acute Stroke  Consult performed by: Kaylah Be MD  Consult ordered by: Barb Zaman,           Consulting Provider: BARB ZAMAN.  Current Providers  No providers found    Patient Location:  [facility] EMERGENCY DEPART* Emergency Department  Spoke hospital nurse at bedside with patient assisting consultant.     Patient information was obtained from patient and relative(s).         Assessment/Plan:       Diagnoses:   Neuro  Cerebral infarction due to embolism of left middle cerebral artery  77 yo female w/ PMHx of HTN, HLD, PVD and Hx of PE, Hx of tobacco abuse (remote) who presents w/ difficulty speaking and gait impairment.   LKW on 9/24 at 17:00.  No previous episodes such as this. Symptoms have persistent since then.  On Xarelto for Hx of PE. Compliant   Did have an episode of palpitations yesterday, no previous episodes such as this.     NIHSS 5. OOW for TNK based on timeline and CTH w/ L posterior frontal acute infarct noted.   Recommend admission for completion of stroke w/u and to continue the Xarelto patient is on at home.     Recommendations:  CTA Head & Neck ASAP to evaluate cervico-cephalic vasculature for high risk culprit vessel disease or large/medium vessel occlusion  May require Vascular Surgery evaluation depending on the results.   MRI brain to evaluate for presence and/or extent of ischemic injury  Allow for permissive HTNsion up to 220/110 x at least 24 hours post symptom onset  Lipid profile, A1c, TSH  ECHO w/o bubble study  Cardiac event monitor outpatient recommended   PT/OT/SLP eval and Rx  Continue home AC  High intensity statin for LDL goal <70 long term     Discussed recommendations w/ Dr. Zaman.         STROKE DOCUMENTATION     Acute Stroke Times:   Acute Stroke Times   Last Known Normal Date:  09/24/23  Last Known Normal Time: 1700  Symptom Onset Date: 09/24/23  Symptom Onset Time: 1700  Stroke Team Called Date: 09/24/23  Stroke Team Called Time: 1148  Stroke Team Arrival Date: 09/24/23  Stroke Team Arrival Time: 1154 (Cart offline, messaged [facility] regarding the issue)  CT Interpretation Time: 1150  Thrombolytic Therapy Recommended: No  CTA Interpretation Time:  (Recommended, pending)  Thrombectomy Recommended: No    NIH Scale:  1a. Level of Consciousness: 0-->Alert, keenly responsive  1b. LOC Questions: 1-->Answers one question correctly  1c. LOC Commands: 0-->Performs both tasks correctly  2. Best Gaze: 0-->Normal  3. Visual: 0-->No visual loss  4. Facial Palsy: 1-->Minor paralysis (flattened nasolabial fold, asymmetry on smiling)  5a. Motor Arm, Left: 0-->No drift, limb holds 90 (or 45) degrees for full 10 secs  5b. Motor Arm, Right: 0-->No drift, limb holds 90 (or 45) degrees for full 10 secs  6a. Motor Leg, Left: 0-->No drift, leg holds 30 degree position for full 5 secs  6b. Motor Leg, Right: 1-->Drift, leg falls by the end of the 5-sec period but does not hit bed (Reports chronic issue due to LBP/surgery)  7. Limb Ataxia: 0-->Absent  8. Sensory: 1-->Mild-to-moderate sensory loss, patient feels pinprick is less sharp or is dull on the affected side, or there is a loss of superficial pain with pinprick, but patient is aware of being touched  9. Best Language: 1-->Mild-to-moderate aphasia, some obvious loss of fluency or facility of comprehension, without significant limitation on ideas expressed or form of expression. Reduction of speech and/or comprehension, however, makes conversation. . . (see row details)  10. Dysarthria: 0-->Normal  11. Extinction and Inattention (formerly Neglect): 0-->No abnormality  Total (NIH Stroke Scale): 5     Modified Lewellen Score: 1  Salina Coma Scale:    ABCD2 Score:    [id number] Score:   HAS -BLED Score:   ICH Score:   Hunt & Kruger Classification:       Blood pressure  "(!) 187/76, pulse 70, temperature 98.6 °F (37 °C), temperature source Oral, resp. rate 12, height 5' 7" (1.702 m), SpO2 100 %.  Eligible for thrombolytic therapy?: No  Thrombolytic therapy recomended: Thrombolytic therapy not recommended due to Outside of treatment window   Possible Interventional Revascularization Candidate? Awaiting CTA results from Spoke for determination  and No; Large core infarct on imaging     Disposition Recommendation: pending further studies  admit to inpatient    Subjective:     History of Present Illness:  79 yo female w/ PMHx of HTN, HLD, PVD and Hx of PE, Hx of tobacco abuse (remote) who presents w/ difficulty speaking and gait impairment.   LKW on 9/24 at 17:00.  No previous episodes such as this. Symptoms have persistent since then.  On Xarelto for Hx of PE. Compliant   Did have an episode of palpitations yesterday, no previous episodes such as this.         Woke up with symptoms?: yes    Recent bleeding noted: no  Does the patient take any Blood Thinners? yes  Medications: Anticoagulants:  rivaroxaban/Xarelto      Past Medical History: hypertension, hyperlipidemia, and PE, PVD    Past Surgical History: none    Family History: no relevant history    Social History: former smoker    Allergies: No Known Allergies No relevant allergies    Review of Systems   Constitutional:  Positive for activity change.   HENT:  Positive for voice change.    Eyes: Negative.    Respiratory: Negative.     Cardiovascular:  Positive for palpitations.   Gastrointestinal:  Negative for nausea and vomiting.   Endocrine: Negative.    Musculoskeletal:  Positive for gait problem.   Skin: Negative.    Neurological:  Positive for facial asymmetry, speech difficulty, weakness and numbness. Negative for light-headedness.   Hematological: Negative.    Psychiatric/Behavioral:  Positive for decreased concentration.      Objective:   Vitals: Blood pressure (!) 187/76, pulse 70, temperature 98.6 °F (37 °C), temperature " "source Oral, resp. rate 12, height 5' 7" (1.702 m), SpO2 100 %.     CT READ: Yes  Abnormal CT L posterior frontal lobe infarct, acute.     Physical Exam  Constitutional:       Appearance: Normal appearance.   HENT:      Head:      Comments: Mild RFD noted  Eyes:      Extraocular Movements: Extraocular movements intact.   Cardiovascular:      Rate and Rhythm: Normal rate.   Pulmonary:      Effort: Pulmonary effort is normal.   Musculoskeletal:      Cervical back: Normal range of motion.   Neurological:      Mental Status: She is alert.      Cranial Nerves: Cranial nerve deficit present.      Sensory: Sensory deficit present.      Motor: Weakness present.      Comments: Mild RFD   Expressive aphasia, mild-mod  RLE drift - chronic due to LBP/surgery  RUE sensory deficit               Recommended the emergency room physician to have a brief discussion with the patient and/or family if available regarding the  risks and benefits of treatment, and to briefly document the occurrence of that discussion in his clinical encounter note.     The attending portion of this evaluation, treatment, and documentation was performed per Kaylah Be MD via audiovisual.    Billing code:  (non-intervention mild to moderate stroke, TIA, some mimics)      This patient has a critical neurological condition/illness, with some potential for high morbidity and mortality.  There is a moderate probability for acute neurological change leading to clinical and possibly life-threatening deterioration requiring highest level of physician preparedness for urgent intervention.  Care was coordinated with other physicians involved in the patient's care.  Radiologic studies and laboratory data were reviewed and interpreted, and plan of care was re-assessed based on the results.  Diagnosis, treatment options and prognosis may have been discussed with the patient and/or family members or caregiver.    In your opinion, this was a: Tier 1 Van " Positive    Consult End Time: 1:14 PM     Kaylah Be MD  Comprehensive Stroke Center  Vascular Neurology   [facility]

## 2023-09-25 NOTE — PROGRESS NOTES
"     Alfonso Wagoner DO   [facility]  [address] [address] [address]  [phone]      PATIENT NAME: Felicia Keita  : 1945  DATE: 23  MRN: [id number]      Billing Provider: Alfonso Wagoner DO  Level of Service: [place] OFFICE/OUTPT VISIT, EST, LEVL II, 10-19 MIN  Patient PCP Information       Provider PCP Type    Silva Peters NP General            Reason for Visit / Chief Complaint: Dizziness (Pt c/o being dizzy yesterday afternoon. X 1 day. ), Otalgia (Pt c/o her right ear hurting x 1 day. ), and Nausea (Pt c/o being nausea x 1 day. )       Update PCP  Update Chief Complaint         History of Present Illness / Problem Focused Workflow     Felicia Keita presents to the clinic with Dizziness (Pt c/o being dizzy yesterday afternoon. X 1 day. ), Otalgia (Pt c/o her right ear hurting x 1 day. ), and Nausea (Pt c/o being nausea x 1 day. )     Patient is a 78-year-old female with history of TIA  presenting with dizziness since yesterday afternoon. Patient is accompanied by her son and grandson at the time of my exam.  Patient's son reports that her speech has been slurred; she has been speaking as if she had been taking "pain medication".  Patient's grandson states that she is normally ambulatory and active around the house.  However, she is presently too weak to ambulate on her own and is in a wheelchair at the time of my exam.  Patient reports headache but denies chest pain, shortness of breath, palpitations,nausea and vomiting.      Review of Systems     Review of Systems   Constitutional:  Negative for chills, diaphoresis and fever.   HENT:  Negative for congestion and sore throat.    Respiratory:  Negative for shortness of breath.    Cardiovascular:  Negative for chest pain and palpitations.   Gastrointestinal:  Negative for abdominal pain, constipation, diarrhea, nausea and vomiting.   Genitourinary:  Negative for dysuria and hematuria.   Skin:  Negative for rash. "   Neurological:  Positive for dizziness and weakness. Negative for light-headedness and headaches.       Medical / Social / Family History     Past Medical History:   Diagnosis Date    Abnormal gait 05/22/2013    Actinic keratosis 08/12/2020    L forearm     Allergic rhinitis 04/07/2020    Blepharophimosis 04/22/2014    senile    Cervical somatic dysfunction 08/15/2017    Cervicalgia 03/28/2019    Chronic peripheral venous hypertension 06/16/2015    Chronic serous otitis media, bilateral 09/05/2019    Chronic venous hypertension (idiopathic) without complications of unspecified lower extremity 08/15/2017    Conjunctival hemorrhage, right eye 11/02/2020    Deep venous thrombosis of lower extremity 09/17/2012    Degeneration of cervical intervertebral disc 10/21/2011    Degeneration of lumbar intervertebral disc 10/21/2011    Degenerative joint disease involving multiple joints 07/25/2011    Depressive disorder 07/25/2011    Essential hypertension 08/12/2020    Essential tremor 06/24/2014    GERD without esophagitis 08/15/2017    Headache 04/07/2020    Hyperlipidemia 05/08/2012    Insomnia 04/06/2016    Osteoarthritis 01/04/2017    Osteoporosis 11/07/2017    Otalgia, right ear 10/17/2016    Other cervical disc degeneration, unspecified cervical region 08/15/2017    Overflow incontinence 01/06/2020    Pain in right knee 01/06/2020    osteoarthritis    Pain in right leg 09/10/2020    Peripheral arterial occlusive disease 02/24/2015    Peripheral vascular disease, unspecified 08/06/2019    Peripheral venous insufficiency 09/29/2015    Personal history of PE (pulmonary embolism) 08/06/2019    Presence of vena cava filter 03/2015    Pulmonary embolus 03/10/2015    Pulmonary infarction 03/16/2012    Pure hypercholesterolemia 07/25/2011    Restless legs 05/22/2018    Right temporomandibular joint disorder, unspecified 08/18/2020    Rosacea 07/01/2015    Sciatica, right side 07/06/2016    Seborrheic keratosis 01/07/2021     Segmental and somatic dysfunction of thoracic region 03/28/2019    Senile osteoporosis 04/24/2017    Spasm of back muscles 07/25/2011    Trochanteric bursitis of right hip 01/06/2020    Unspecified urinary incontinence 02/07/2020    Vitamin D deficiency 10/27/2014       Past Surgical History:   Procedure Laterality Date    APPENDECTOMY      bone density  06/27/2019    Ajith/Abiel    CARPAL TUNNEL RELEASE      BLI    LUMBAR LAMINECTOMY      prolia  03/12/2019    1mg    remove cataract Right     insert lens    TONSILLECTOMY      TOTAL ABDOMINAL HYSTERECTOMY      VAGINAL DELIVERY      vascular surgery procedure       Right SSV Laser Ablation performed by Dr. Carlo hernandez screen  05/24/2018       Social History  Ms. Keita  reports that she has never smoked. She has never been exposed to tobacco smoke. She has never used smokeless tobacco. She reports that she does not drink alcohol and does not use drugs.    Family History  Ms.'s Keita family history includes COPD in her daughter and sister; Cancer in her brother; Emphysema in her father; Hearing loss in her father; Hypertension in her father and sister; Melanoma in her brother; Rheum arthritis in her daughter and sister; Skin cancer in her father; Stomach cancer in her sister.    Medications and Allergies     Medications  Outpatient Medications Marked as Taking for the 9/25/23 encounter (Office Visit) with Alfonso Wagoner, DO   Medication Sig Dispense Refill    albuterol (PROVENTIL/VENTOLIN HFA) 90 mcg/actuation inhaler Inhale 1-2 puffs into the lungs every 4 (four) hours as needed for Wheezing. Rescue 18 g 2    albuterol-ipratropium (DUO-NEB) 2.5 mg-0.5 mg/3 mL nebulizer solution Take 3 mLs by nebulization every 6 (six) hours as needed for Wheezing. Rescue 100 mL 2    amLODIPine (NORVASC) 5 MG tablet Take 1 tablet (5 mg total) by mouth 2 (two) times daily. 180 tablet 1    ascorbic acid, vitamin C, (VITAMIN C) 500 MG tablet Take 500 mg by mouth once daily.       calcium-vitamin D tablet 600 mg-200 units Take 1 tablet by mouth 2 (two) times a day.      desvenlafaxine succinate (PRISTIQ) 100 MG Tb24 Take 1 tablet (100 mg total) by mouth once daily. 90 tablet 1    diaper,brief,adult,disposable Misc 1 each by Misc.(Non-Drug; Combo Route) route continuous prn (prn). 120 each 5    diclofenac sodium (VOLTAREN) 1 % Gel Apply 4 grams to large joints 4 times daily 450 g 1    furosemide (LASIX) 20 MG tablet Take 1 tablet (20 mg total) by mouth once daily. 90 tablet 1    gabapentin (NEURONTIN) 300 MG capsule TAKE 1 CAPSULE BY MOUTH IN THE MORNING, TAKE 1 CAPSULE at LUNCH, AND TAKE 2 CAPSULES AT BEDTIME 380 capsule 1    HYDROcodone-acetaminophen (NORCO)  mg per tablet 05/27/21 TAKE 1 TABLET BY MOUTH EVERY 8 HOURS AS NEEDED      ipratropium (ATROVENT) 42 mcg (0.06 %) nasal spray 2 sprays by Each Nostril route 4 (four) times daily. 15 mL 5    losartan (COZAAR) 100 MG tablet Take 1 tablet (100 mg total) by mouth once daily. 90 tablet 1    omeprazole (PRILOSEC) 20 MG capsule Take 1 capsule (20 mg total) by mouth once daily. 90 capsule 1    pramipexole (MIRAPEX) 0.125 MG tablet Take 2 tablets (0.25 mg total) by mouth nightly. 180 tablet 1    primidone (MYSOLINE) 50 MG Tab TAKE 2 TABLETS BY MOUTH EVERY MORNING AND TAKE 3 TABLETS BY MOUTH EVERY EVENING 450 tablet 1    propranoloL (INDERAL) 40 MG tablet Take 1 tablet (40 mg total) by mouth 2 (two) times daily. 180 tablet 1    pyrilam-chlophed-acetaminophen 12.5-12.5-160 mg/5 mL Liqd Take 10 mLs by mouth every 8 (eight) hours as needed (COUGH). 473 mL 0    rivaroxaban (XARELTO) 20 mg Tab Take 1 tablet (20 mg total) by mouth once daily. 90 tablet 1    senna-docusate 8.6-50 mg (PERICOLACE) 8.6-50 mg per tablet Take 1 tablet by mouth once daily.      tiZANidine (ZANAFLEX) 4 MG tablet Take 1 tablet (4 mg total) by mouth 2 (two) times daily as needed (muscle spasms). 60 tablet 2    zinc gluconate 50 mg tablet Take 50 mg by mouth once daily.       [DISCONTINUED] atorvastatin (LIPITOR) 40 MG tablet Take 1 tablet (40 mg total) by mouth once daily. 90 tablet 1    [DISCONTINUED] celecoxib (CELEBREX) 200 MG capsule Take 1 capsule (200 mg total) by mouth once daily. 90 capsule 1       Allergies  Review of patient's allergies indicates:  No Known Allergies    Physical Examination     Vitals:    09/25/23 0842   BP: (!) 140/90   Pulse: 78   Resp:    Temp:      Physical Exam  Constitutional:       General: She is not in acute distress.     Appearance: She is not ill-appearing, toxic-appearing or diaphoretic.   HENT:      Head: Normocephalic and atraumatic.      Right Ear: External ear normal.      Left Ear: External ear normal.      Mouth/Throat:      Mouth: Mucous membranes are moist.      Pharynx: No oropharyngeal exudate or posterior oropharyngeal erythema.   Eyes:      General: No scleral icterus.        Right eye: No discharge.         Left eye: No discharge.      Extraocular Movements: Extraocular movements intact.      Conjunctiva/sclera: Conjunctivae normal.      Pupils: Pupils are equal, round, and reactive to light.   Cardiovascular:      Rate and Rhythm: Normal rate and regular rhythm.      Heart sounds: No murmur heard.     No friction rub. No gallop.   Pulmonary:      Effort: No respiratory distress.      Breath sounds: No stridor. No wheezing, rhonchi or rales.   Abdominal:      General: Abdomen is flat. There is no distension.      Tenderness: There is no abdominal tenderness. There is no guarding.   Musculoskeletal:         General: No swelling.      Right lower leg: No edema.      Left lower leg: No edema.   Skin:     General: Skin is warm and dry.      Coloration: Skin is not jaundiced.   Neurological:      Mental Status: She is alert and oriented to person, place, and time.      GCS: GCS eye subscore is 4. GCS verbal subscore is 5. GCS motor subscore is 6.      Cranial Nerves: Dysarthria present.      Coordination: Coordination abnormal.  Finger-Nose-Finger Test abnormal.      Comments: No obvious facial asymmetry noted on exam.  Significant dysmetria noted.         Assessment and Plan (including Health Maintenance)      Problem List  Smart Sets  Document Outside HM   :    Plan:         Health Maintenance Due   Topic Date Due    COVID-19 Vaccine (1) Never done    TETANUS VACCINE  Never done    Shingles Vaccine (1 of 2) Never done    Influenza Vaccine (1) 09/01/2023       Problem List Items Addressed This Visit          Other    Dizziness - Primary    Current Assessment & Plan     Strong suspicion for CVA based on history and physical.  Patient exhibits slurred speech, unilateral weakness an abnormal finger-to-nose test.  Patient and family advised at length that she should present to the local emergency department for further evaluation and management.  Patient and family advised that she is likely out of the window for tPA.  Patient and family also advised at length to present to the ED should these symptoms occur again in the future.  Patient and family express preference to be seen at [facility].         Relevant Orders    POCT URINALYSIS W/O SCOPE (Completed)       Health Maintenance Topics with due status: Not Due       Topic Last Completion Date    DEXA Scan 07/27/2023    Lipid Panel 09/26/2023       Future Appointments   Date Time Provider Department Center   10/5/2023 10:00 AM Alfonso Wagoner DO [facility]   10/12/2023  1:30 PM Odette Cartwright FNP [facility]   12/4/2023 10:30 AM CARLOS EDUARDO CHAVEZ [facility]            Signature:  Alfonso Wagoner DO  [facility] [address] [place] [address]  [phone]    Date of encounter: 9/25/23

## 2023-09-25 NOTE — ASSESSMENT & PLAN NOTE
BP elevated on admission.  Allow permissive HTN for 24 hours.   Resume home amlodipine/losartan once out of window.

## 2023-09-25 NOTE — SUBJECTIVE & OBJECTIVE
Past Medical History:   Diagnosis Date    Abnormal gait 05/22/2013    Actinic keratosis 08/12/2020    L forearm     Allergic rhinitis 04/07/2020    Blepharophimosis 04/22/2014    senile    Cervical somatic dysfunction 08/15/2017    Cervicalgia 03/28/2019    Chronic peripheral venous hypertension 06/16/2015    Chronic serous otitis media, bilateral 09/05/2019    Chronic venous hypertension (idiopathic) without complications of unspecified lower extremity 08/15/2017    Conjunctival hemorrhage, right eye 11/02/2020    Deep venous thrombosis of lower extremity 09/17/2012    Degeneration of cervical intervertebral disc 10/21/2011    Degeneration of lumbar intervertebral disc 10/21/2011    Degenerative joint disease involving multiple joints 07/25/2011    Depressive disorder 07/25/2011    Essential hypertension 08/12/2020    Essential tremor 06/24/2014    GERD without esophagitis 08/15/2017    Headache 04/07/2020    Hyperlipidemia 05/08/2012    Insomnia 04/06/2016    Osteoarthritis 01/04/2017    Osteoporosis 11/07/2017    Otalgia, right ear 10/17/2016    Other cervical disc degeneration, unspecified cervical region 08/15/2017    Overflow incontinence 01/06/2020    Pain in right knee 01/06/2020    osteoarthritis    Pain in right leg 09/10/2020    Peripheral arterial occlusive disease 02/24/2015    Peripheral vascular disease, unspecified 08/06/2019    Peripheral venous insufficiency 09/29/2015    Personal history of PE (pulmonary embolism) 08/06/2019    Presence of vena cava filter 03/2015    Pulmonary embolus 03/10/2015    Pulmonary infarction 03/16/2012    Pure hypercholesterolemia 07/25/2011    Restless legs 05/22/2018    Right temporomandibular joint disorder, unspecified 08/18/2020    Rosacea 07/01/2015    Sciatica, right side 07/06/2016    Seborrheic keratosis 01/07/2021    Segmental and somatic dysfunction of thoracic region 03/28/2019    Senile osteoporosis 04/24/2017    Spasm of back muscles 07/25/2011     Trochanteric bursitis of right hip 01/06/2020    Unspecified urinary incontinence 02/07/2020    Vitamin D deficiency 10/27/2014       Past Surgical History:   Procedure Laterality Date    APPENDECTOMY      bone density  06/27/2019    Ajith/Abiel    CARPAL TUNNEL RELEASE      BLI    LUMBAR LAMINECTOMY      prolia  03/12/2019    1mg    remove cataract Right     insert lens    TONSILLECTOMY      TOTAL ABDOMINAL HYSTERECTOMY      VAGINAL DELIVERY      vascular surgery procedure       Right SSV Laser Ablation performed by Dr. Carlo hernandez screen  05/24/2018       Review of patient's allergies indicates:  No Known Allergies    No current facility-administered medications on file prior to encounter.     Current Outpatient Medications on File Prior to Encounter   Medication Sig    albuterol (PROVENTIL/VENTOLIN HFA) 90 mcg/actuation inhaler Inhale 1-2 puffs into the lungs every 4 (four) hours as needed for Wheezing. Rescue    albuterol-ipratropium (DUO-NEB) 2.5 mg-0.5 mg/3 mL nebulizer solution Take 3 mLs by nebulization every 6 (six) hours as needed for Wheezing. Rescue    amLODIPine (NORVASC) 5 MG tablet Take 1 tablet (5 mg total) by mouth 2 (two) times daily.    ascorbic acid, vitamin C, (VITAMIN C) 500 MG tablet Take 500 mg by mouth once daily.    atorvastatin (LIPITOR) 40 MG tablet Take 1 tablet (40 mg total) by mouth once daily.    calcium-vitamin D tablet 600 mg-200 units Take 1 tablet by mouth 2 (two) times a day.    cetirizine (ZYRTEC) 10 MG tablet Take 1 tablet (10 mg total) by mouth once daily. (Patient not taking: Reported on 9/25/2023)    denosumab (PROLIA) 60 mg/mL Syrg Inject 60 mg into the skin every 6 (six) months.    desvenlafaxine succinate (PRISTIQ) 100 MG Tb24 Take 1 tablet (100 mg total) by mouth once daily.    diaper,brief,adult,disposable Misc 1 each by Misc.(Non-Drug; Combo Route) route continuous prn (prn).    diclofenac sodium (VOLTAREN) 1 % Gel Apply 4 grams to large joints 4 times daily     furosemide (LASIX) 20 MG tablet Take 1 tablet (20 mg total) by mouth once daily.    gabapentin (NEURONTIN) 300 MG capsule TAKE 1 CAPSULE BY MOUTH IN THE MORNING, TAKE 1 CAPSULE at LUNCH, AND TAKE 2 CAPSULES AT BEDTIME    HYDROcodone-acetaminophen (NORCO)  mg per tablet 05/27/21 TAKE 1 TABLET BY MOUTH EVERY 8 HOURS AS NEEDED    ipratropium (ATROVENT) 42 mcg (0.06 %) nasal spray 2 sprays by Each Nostril route 4 (four) times daily.    losartan (COZAAR) 100 MG tablet Take 1 tablet (100 mg total) by mouth once daily.    omeprazole (PRILOSEC) 20 MG capsule Take 1 capsule (20 mg total) by mouth once daily.    pramipexole (MIRAPEX) 0.125 MG tablet Take 2 tablets (0.25 mg total) by mouth nightly.    primidone (MYSOLINE) 50 MG Tab TAKE 2 TABLETS BY MOUTH EVERY MORNING AND TAKE 3 TABLETS BY MOUTH EVERY EVENING    propranoloL (INDERAL) 40 MG tablet Take 1 tablet (40 mg total) by mouth 2 (two) times daily.    pyrilam-chlophed-acetaminophen 12.5-12.5-160 mg/5 mL Liqd Take 10 mLs by mouth every 8 (eight) hours as needed (COUGH).    rivaroxaban (XARELTO) 20 mg Tab Take 1 tablet (20 mg total) by mouth once daily.    senna-docusate 8.6-50 mg (PERICOLACE) 8.6-50 mg per tablet Take 1 tablet by mouth once daily.    tiZANidine (ZANAFLEX) 4 MG tablet Take 1 tablet (4 mg total) by mouth 2 (two) times daily as needed (muscle spasms).    zinc gluconate 50 mg tablet Take 50 mg by mouth once daily.    [DISCONTINUED] celecoxib (CELEBREX) 200 MG capsule Take 1 capsule (200 mg total) by mouth once daily.     Family History       Problem Relation (Age of Onset)    COPD Sister, Daughter    Cancer Brother    Emphysema Father    Hearing loss Father    Hypertension Father, Sister    Melanoma Brother    Rheum arthritis Sister, Daughter    Skin cancer Father    Stomach cancer Sister          Tobacco Use    Smoking status: Never     Passive exposure: Never    Smokeless tobacco: Never   Substance and Sexual Activity    Alcohol use: Never    Drug  use: Never    Sexual activity: Not Currently     Review of Systems   Neurological:  Positive for speech difficulty, weakness and numbness.     Objective:     Vital Signs (Most Recent):  Temp: 98.6 °F (37 °C) (09/25/23 0951)  Pulse: 70 (09/25/23 1221)  Resp: 12 (09/25/23 1136)  BP: (!) 187/76 (09/25/23 1221)  SpO2: 100 % (09/25/23 1221) Vital Signs (24h Range):  Temp:  [97.8 °F (36.6 °C)-98.6 °F (37 °C)] 98.6 °F (37 °C)  Pulse:  [64-78] 70  Resp:  [12-18] 12  SpO2:  [96 %-100 %] 100 %  BP: (135-201)/(67-90) 187/76        Body mass index is 32.26 kg/m².     Physical Exam  Vitals and nursing note reviewed.   Constitutional:       Appearance: Normal appearance.   HENT:      Head: Normocephalic and atraumatic.      Mouth/Throat:      Mouth: Mucous membranes are moist.   Eyes:      Extraocular Movements: Extraocular movements intact.      Pupils: Pupils are equal, round, and reactive to light.   Cardiovascular:      Rate and Rhythm: Normal rate and regular rhythm.   Pulmonary:      Effort: Pulmonary effort is normal.      Breath sounds: Normal breath sounds.   Abdominal:      General: Bowel sounds are normal.      Palpations: Abdomen is soft.   Musculoskeletal:         General: Normal range of motion.      Cervical back: Normal range of motion and neck supple.   Neurological:      Mental Status: She is alert.      Cranial Nerves: Facial asymmetry present.      Motor: Weakness and tremor present.      Gait: Gait abnormal.   Psychiatric:         Mood and Affect: Mood normal.         Behavior: Behavior normal.              CRANIAL NERVES     CN III, IV, VI   Pupils are equal, round, and reactive to light.       Significant Labs: All pertinent labs within the past 24 hours have been reviewed.    Significant Imaging: I have reviewed all pertinent imaging results/findings within the past 24 hours.

## 2023-09-25 NOTE — ASSESSMENT & PLAN NOTE
77 yo female w/ PMHx of HTN, HLD, PVD and Hx of PE, Hx of tobacco abuse (remote) who presents w/ difficulty speaking and gait impairment.   LKW on 9/24 at 17:00.  No previous episodes such as this. Symptoms have persistent since then.  On Xarelto for Hx of PE. Compliant   Did have an episode of palpitations yesterday, no previous episodes such as this.     NIHSS 5. OOW for TNK based on timeline and CTH w/ L posterior frontal acute infarct noted.   Recommend admission for completion of stroke w/u and to continue the Xarelto patient is on at home.     Recommendations:  · CTA Head & Neck ASAP to evaluate cervico-cephalic vasculature for high risk culprit vessel disease or large/medium vessel occlusion  · May require Vascular Surgery evaluation depending on the results.   · MRI brain to evaluate for presence and/or extent of ischemic injury  · Allow for permissive HTNsion up to 220/110 x at least 24 hours post symptom onset  · Lipid profile, A1c, TSH  · ECHO w/o bubble study  · Cardiac event monitor outpatient recommended   · PT/OT/SLP eval and Rx  · Continue home AC  · High intensity statin for LDL goal <70 long term     Discussed recommendations w/ Dr. Hicks.

## 2023-09-25 NOTE — ASSESSMENT & PLAN NOTE
Presented with 1 day onset of aphasia, right lower extremity weakness and unsteady gait.   CT head showed an acute area of infarct in the posterior left frontal lobe.   CTA head & neck showed no intracranial large vessel occlusion, no intracranial aneurysm or significant stenosis. Atherosclerotic disease seen along the aortic arch and in the carotid bifurcations without significant stenosis.   Tele-stroke neurologist consulted in ED, recommend admission for MRI brain without contrast, echo with bubble study- ordered.     Antithrombotics for secondary stroke prevention: Antiplatelets: Aspirin: 81 mg daily  Anticoagulants: Rivaroxaban 20 mg daily    Statins for secondary stroke prevention and hyperlipidemia, if present:   Statins: Atorvastatin- 80 mg daily (increased from home dose of 40 mg, ordered lipid panel).     Aggressive risk factor modification: HTN, HLD     Rehab efforts: The patient has been evaluated by a stroke team provider and the therapy needs have been fully considered based off the presenting complaints and exam findings. The following therapy evaluations are needed: PT evaluate and treat, OT evaluate and treat, SLP evaluate and treat    Diagnostics ordered/pending: Carotid ultrasound to assess vasculature, CT scan of head without contrast to asses brain parenchyma, CTA Head to assess vasculature , CTA Neck/Arch to assess vasculature, HgbA1C to assess blood glucose levels, Lipid Profile to assess cholesterol levels, MRI head without contrast to assess brain parenchyma, TTE to assess cardiac function/status , TSH to assess thyroid function    VTE prophylaxis: Mechanical prophylaxis: Place SCDs    BP parameters: Infarct: No intervention, SBP <220

## 2023-09-25 NOTE — H&P
[facility] - Emergency Department  Hospital Medicine  History & Physical    Patient Name: Felicia Keita  MRN: [id number]  Patient Class: IP- Inpatient  Admission Date: 9/25/2023  Attending Physician: Vasquez Herrera MD   Primary Care Provider: Silva Peters NP (Inactive)         Patient information was obtained from patient, relative(s), past medical records and ER records.     Subjective:     Principal Problem:Acute ischemic stroke    Chief Complaint:   Chief Complaint   Patient presents with    Speech Problem        HPI: Ms. 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 Per patient, she was in usual state around 5PM yesterday and then suddenly she noted to have work finding difficulty associated with unsteady gait. Her speech was slurred and she felt right leg heavier with numbness. Her symptoms persisted through the evening but she did not seek medical attention thinking that this will get better. Next day (which is today), she woke up with slightly worsening of her speech and her weakness and unsteady gait persisted so she presented to ED. No reports of head trauma, seizure like activity, fever, chills. She denies any right arm weakness. Her speech has slightly improved since she has been in the ED. She reports a history of mini-stroke a few years ago without residual deficits. She is very functional at baseline, she drives and takes care of her daughter who has history of aneurysm and strokes.       Past Medical History:   Diagnosis Date    Abnormal gait 05/22/2013    Actinic keratosis 08/12/2020    L forearm     Allergic rhinitis 04/07/2020    Blepharophimosis 04/22/2014    senile    Cervical somatic dysfunction 08/15/2017    Cervicalgia 03/28/2019    Chronic peripheral venous hypertension 06/16/2015    Chronic serous otitis  media, bilateral 09/05/2019    Chronic venous hypertension (idiopathic) without complications of unspecified lower extremity 08/15/2017    Conjunctival hemorrhage, right eye 11/02/2020    Deep venous thrombosis of lower extremity 09/17/2012    Degeneration of cervical intervertebral disc 10/21/2011    Degeneration of lumbar intervertebral disc 10/21/2011    Degenerative joint disease involving multiple joints 07/25/2011    Depressive disorder 07/25/2011    Essential hypertension 08/12/2020    Essential tremor 06/24/2014    GERD without esophagitis 08/15/2017    Headache 04/07/2020    Hyperlipidemia 05/08/2012    Insomnia 04/06/2016    Osteoarthritis 01/04/2017    Osteoporosis 11/07/2017    Otalgia, right ear 10/17/2016    Other cervical disc degeneration, unspecified cervical region 08/15/2017    Overflow incontinence 01/06/2020    Pain in right knee 01/06/2020    osteoarthritis    Pain in right leg 09/10/2020    Peripheral arterial occlusive disease 02/24/2015    Peripheral vascular disease, unspecified 08/06/2019    Peripheral venous insufficiency 09/29/2015    Personal history of PE (pulmonary embolism) 08/06/2019    Presence of vena cava filter 03/2015    Pulmonary embolus 03/10/2015    Pulmonary infarction 03/16/2012    Pure hypercholesterolemia 07/25/2011    Restless legs 05/22/2018    Right temporomandibular joint disorder, unspecified 08/18/2020    Rosacea 07/01/2015    Sciatica, right side 07/06/2016    Seborrheic keratosis 01/07/2021    Segmental and somatic dysfunction of thoracic region 03/28/2019    Senile osteoporosis 04/24/2017    Spasm of back muscles 07/25/2011    Trochanteric bursitis of right hip 01/06/2020    Unspecified urinary incontinence 02/07/2020    Vitamin D deficiency 10/27/2014       Past Surgical History:   Procedure Laterality Date    APPENDECTOMY      bone density  06/27/2019    Ajith/Abiel    CARPAL TUNNEL RELEASE      BLI    LUMBAR LAMINECTOMY      prolia  03/12/2019    1mg     remove cataract Right     insert lens    TONSILLECTOMY      TOTAL ABDOMINAL HYSTERECTOMY      VAGINAL DELIVERY      vascular surgery procedure       Right SSV Laser Ablation performed by Dr. Carlo hernandez screen  05/24/2018       Review of patient's allergies indicates:  No Known Allergies    No current facility-administered medications on file prior to encounter.     Current Outpatient Medications on File Prior to Encounter   Medication Sig    albuterol (PROVENTIL/VENTOLIN HFA) 90 mcg/actuation inhaler Inhale 1-2 puffs into the lungs every 4 (four) hours as needed for Wheezing. Rescue    albuterol-ipratropium (DUO-NEB) 2.5 mg-0.5 mg/3 mL nebulizer solution Take 3 mLs by nebulization every 6 (six) hours as needed for Wheezing. Rescue    amLODIPine (NORVASC) 5 MG tablet Take 1 tablet (5 mg total) by mouth 2 (two) times daily.    ascorbic acid, vitamin C, (VITAMIN C) 500 MG tablet Take 500 mg by mouth once daily.    atorvastatin (LIPITOR) 40 MG tablet Take 1 tablet (40 mg total) by mouth once daily.    calcium-vitamin D tablet 600 mg-200 units Take 1 tablet by mouth 2 (two) times a day.    cetirizine (ZYRTEC) 10 MG tablet Take 1 tablet (10 mg total) by mouth once daily. (Patient not taking: Reported on 9/25/2023)    denosumab (PROLIA) 60 mg/mL Syrg Inject 60 mg into the skin every 6 (six) months.    desvenlafaxine succinate (PRISTIQ) 100 MG Tb24 Take 1 tablet (100 mg total) by mouth once daily.    diaper,brief,adult,disposable Misc 1 each by Misc.(Non-Drug; Combo Route) route continuous prn (prn).    diclofenac sodium (VOLTAREN) 1 % Gel Apply 4 grams to large joints 4 times daily    furosemide (LASIX) 20 MG tablet Take 1 tablet (20 mg total) by mouth once daily.    gabapentin (NEURONTIN) 300 MG capsule TAKE 1 CAPSULE BY MOUTH IN THE MORNING, TAKE 1 CAPSULE at LUNCH, AND TAKE 2 CAPSULES AT BEDTIME    HYDROcodone-acetaminophen (NORCO)  mg per tablet 05/27/21 TAKE 1 TABLET BY MOUTH EVERY 8 HOURS AS NEEDED     ipratropium (ATROVENT) 42 mcg (0.06 %) nasal spray 2 sprays by Each Nostril route 4 (four) times daily.    losartan (COZAAR) 100 MG tablet Take 1 tablet (100 mg total) by mouth once daily.    omeprazole (PRILOSEC) 20 MG capsule Take 1 capsule (20 mg total) by mouth once daily.    pramipexole (MIRAPEX) 0.125 MG tablet Take 2 tablets (0.25 mg total) by mouth nightly.    primidone (MYSOLINE) 50 MG Tab TAKE 2 TABLETS BY MOUTH EVERY MORNING AND TAKE 3 TABLETS BY MOUTH EVERY EVENING    propranoloL (INDERAL) 40 MG tablet Take 1 tablet (40 mg total) by mouth 2 (two) times daily.    pyrilam-chlophed-acetaminophen 12.5-12.5-160 mg/5 mL Liqd Take 10 mLs by mouth every 8 (eight) hours as needed (COUGH).    rivaroxaban (XARELTO) 20 mg Tab Take 1 tablet (20 mg total) by mouth once daily.    senna-docusate 8.6-50 mg (PERICOLACE) 8.6-50 mg per tablet Take 1 tablet by mouth once daily.    tiZANidine (ZANAFLEX) 4 MG tablet Take 1 tablet (4 mg total) by mouth 2 (two) times daily as needed (muscle spasms).    zinc gluconate 50 mg tablet Take 50 mg by mouth once daily.    [DISCONTINUED] celecoxib (CELEBREX) 200 MG capsule Take 1 capsule (200 mg total) by mouth once daily.     Family History       Problem Relation (Age of Onset)    COPD Sister, Daughter    Cancer Brother    Emphysema Father    Hearing loss Father    Hypertension Father, Sister    Melanoma Brother    Rheum arthritis Sister, Daughter    Skin cancer Father    Stomach cancer Sister          Tobacco Use    Smoking status: Never     Passive exposure: Never    Smokeless tobacco: Never   Substance and Sexual Activity    Alcohol use: Never    Drug use: Never    Sexual activity: Not Currently     Review of Systems   Neurological:  Positive for speech difficulty, weakness and numbness.     Objective:     Vital Signs (Most Recent):  Temp: 98.6 °F (37 °C) (09/25/23 0951)  Pulse: 70 (09/25/23 1221)  Resp: 12 (09/25/23 1136)  BP: (!) 187/76 (09/25/23 1221)  SpO2: 100 % (09/25/23 1221)  Vital Signs (24h Range):  Temp:  [97.8 °F (36.6 °C)-98.6 °F (37 °C)] 98.6 °F (37 °C)  Pulse:  [64-78] 70  Resp:  [12-18] 12  SpO2:  [96 %-100 %] 100 %  BP: (135-201)/(67-90) 187/76        Body mass index is 32.26 kg/m².     Physical Exam  Vitals and nursing note reviewed.   Constitutional:       Appearance: Normal appearance.   HENT:      Head: Normocephalic and atraumatic.      Mouth/Throat:      Mouth: Mucous membranes are moist.   Eyes:      Extraocular Movements: Extraocular movements intact.      Pupils: Pupils are equal, round, and reactive to light.   Cardiovascular:      Rate and Rhythm: Normal rate and regular rhythm.   Pulmonary:      Effort: Pulmonary effort is normal.      Breath sounds: Normal breath sounds.   Abdominal:      General: Bowel sounds are normal.      Palpations: Abdomen is soft.   Musculoskeletal:         General: Normal range of motion.      Cervical back: Normal range of motion and neck supple.   Neurological:      Mental Status: She is alert.      Cranial Nerves: Facial asymmetry present.      Motor: Weakness and tremor present.      Gait: Gait abnormal.   Psychiatric:         Mood and Affect: Mood normal.         Behavior: Behavior normal.              CRANIAL NERVES     CN III, IV, VI   Pupils are equal, round, and reactive to light.       Significant Labs: All pertinent labs within the past 24 hours have been reviewed.    Significant Imaging: I have reviewed all pertinent imaging results/findings within the past 24 hours.    Assessment/Plan:     * Acute ischemic stroke  Presented with 1 day onset of aphasia, right lower extremity weakness and unsteady gait.   CT head showed an acute area of infarct in the posterior left frontal lobe.   CTA head & neck showed no intracranial large vessel occlusion, no intracranial aneurysm or significant stenosis. Atherosclerotic disease seen along the aortic arch and in the carotid bifurcations without significant stenosis.   Tele-stroke neurologist  consulted in ED, recommend admission for MRI brain without contrast, echo with bubble study- ordered.     Antithrombotics for secondary stroke prevention: Antiplatelets: Aspirin: 81 mg daily  Anticoagulants: Rivaroxaban 20 mg daily    Statins for secondary stroke prevention and hyperlipidemia, if present:   Statins: Atorvastatin- 80 mg daily (increased from home dose of 40 mg, ordered lipid panel).     Aggressive risk factor modification: HTN, HLD     Rehab efforts: The patient has been evaluated by a stroke team provider and the therapy needs have been fully considered based off the presenting complaints and exam findings. The following therapy evaluations are needed: PT evaluate and treat, OT evaluate and treat, SLP evaluate and treat    Diagnostics ordered/pending: Carotid ultrasound to assess vasculature, CT scan of head without contrast to asses brain parenchyma, CTA Head to assess vasculature , CTA Neck/Arch to assess vasculature, HgbA1C to assess blood glucose levels, Lipid Profile to assess cholesterol levels, MRI head without contrast to assess brain parenchyma, TTE to assess cardiac function/status , TSH to assess thyroid function    VTE prophylaxis: Mechanical prophylaxis: Place SCDs    BP parameters: Infarct: No intervention, SBP <220        Uncontrolled hypertension  BP elevated on admission.  Allow permissive HTN for 24 hours.   Resume home amlodipine/losartan once out of window.       Chronic pain syndrome   reviewed: Resume home Norco, tizanidine and gabapentin.       Nodule of parotid gland  Incidental finding.   CTA head/neck showed nodule in the right superficial parotid gland measuring 1.4 cm favors a primary parotid neoplasm.\  Recommend ENT evaluation as outpatient.       Recurrent deep vein thrombosis (DVT)  Resume home Xarelto.       Essential tremor  Resume home propranolol and primidone.       Chronic obstructive pulmonary disease  Stable without exacerbation.  PRN albuterol inhaler.        Hyperlipidemia  Resume home statin- dose increased given CVA.       GERD without esophagitis  Resume home oral PPI.       Restless legs  Resume home pramipexole.       Depression  Patient has recurrent depression which is moderate and is currently controlled. Will Continue anti-depressant medications. We will not consult psychiatry at this time. Patient does not display psychosis at this time. Continue to monitor closely and adjust plan of care as needed.          VTE Risk Mitigation (From admission, onward)           Ordered     rivaroxaban tablet 20 mg  Daily         09/25/23 1329     Reason for No Pharmacological VTE Prophylaxis  Once        Question:  Reasons:  Answer:  Already adequately anticoagulated on oral Anticoagulants    09/25/23 1329     IP VTE HIGH RISK PATIENT  Once         09/25/23 1329     Place sequential compression device  Until discontinued         09/25/23 1329                       Grandson updated at bedside regarding plan of care. All questions answered.         JOHN GREGORIO MD  Department of Hospital Medicine  [facility] - Emergency Department

## 2023-09-25 NOTE — HPI
79 yo female w/ PMHx of HTN, HLD, PVD and Hx of PE, Hx of tobacco abuse (remote) who presents w/ difficulty speaking and gait impairment.   LKW on 9/24 at 17:00.  No previous episodes such as this. Symptoms have persistent since then.  On Xarelto for Hx of PE. Compliant   Did have an episode of palpitations yesterday, no previous episodes such as this.

## 2023-09-25 NOTE — SUBJECTIVE & OBJECTIVE
"  Woke up with symptoms?: yes    Recent bleeding noted: no  Does the patient take any Blood Thinners? yes  Medications: Anticoagulants:  rivaroxaban/Xarelto      Past Medical History: hypertension, hyperlipidemia, and PE, PVD    Past Surgical History: none    Family History: no relevant history    Social History: former smoker    Allergies: No Known Allergies No relevant allergies    Review of Systems   Constitutional:  Positive for activity change.   HENT:  Positive for voice change.    Eyes: Negative.    Respiratory: Negative.     Cardiovascular:  Positive for palpitations.   Gastrointestinal:  Negative for nausea and vomiting.   Endocrine: Negative.    Musculoskeletal:  Positive for gait problem.   Skin: Negative.    Neurological:  Positive for facial asymmetry, speech difficulty, weakness and numbness. Negative for light-headedness.   Hematological: Negative.    Psychiatric/Behavioral:  Positive for decreased concentration.      Objective:   Vitals: Blood pressure (!) 187/76, pulse 70, temperature 98.6 °F (37 °C), temperature source Oral, resp. rate 12, height 5' 7" (1.702 m), SpO2 100 %.     CT READ: Yes  Abnormal CT L posterior frontal lobe infarct, acute.     Physical Exam  Constitutional:       Appearance: Normal appearance.   HENT:      Head:      Comments: Mild RFD noted  Eyes:      Extraocular Movements: Extraocular movements intact.   Cardiovascular:      Rate and Rhythm: Normal rate.   Pulmonary:      Effort: Pulmonary effort is normal.   Musculoskeletal:      Cervical back: Normal range of motion.   Neurological:      Mental Status: She is alert.      Cranial Nerves: Cranial nerve deficit present.      Sensory: Sensory deficit present.      Motor: Weakness present.      Comments: Mild RFD   Expressive aphasia, mild-mod  RLE drift - chronic due to LBP/surgery  RUE sensory deficit             "

## 2023-09-25 NOTE — ASSESSMENT & PLAN NOTE
Incidental finding.   CTA head/neck showed nodule in the right superficial parotid gland measuring 1.4 cm favors a primary parotid neoplasm.\  Recommend ENT evaluation as outpatient.

## 2023-09-26 LAB
ALBUMIN SERPL BCP-MCNC: 3.3 G/DL (ref 3.5–5)
ALBUMIN/GLOB SERPL: 1.1 {RATIO}
ALP SERPL-CCNC: 80 U/L (ref 55–142)
ALT SERPL W P-5'-P-CCNC: 26 U/L (ref 13–56)
ANION GAP SERPL CALCULATED.3IONS-SCNC: 9 MMOL/L (ref 7–16)
AORTIC ROOT ANNULUS: 1.99 CM
AORTIC VALVE CUSP SEPERATION: 1.53 CM
AST SERPL W P-5'-P-CCNC: 24 U/L (ref 15–37)
AV INDEX (PROSTH): 0.72
AV MEAN GRADIENT: 4 MMHG
AV PEAK GRADIENT: 8 MMHG
AV VALVE AREA BY VELOCITY RATIO: 2.01 CM²
AV VALVE AREA: 2.17 CM²
AV VELOCITY RATIO: 0.67
BASOPHILS # BLD AUTO: 0.06 K/UL (ref 0–0.2)
BASOPHILS NFR BLD AUTO: 1 % (ref 0–1)
BILIRUB SERPL-MCNC: 0.3 MG/DL (ref ?–1.2)
BSA FOR ECHO PROCEDURE: 2.1 M2
BUN SERPL-MCNC: 14 MG/DL (ref 7–18)
BUN/CREAT SERPL: 14 (ref 6–20)
CALCIUM SERPL-MCNC: 8.3 MG/DL (ref 8.5–10.1)
CHLORIDE SERPL-SCNC: 96 MMOL/L (ref 98–107)
CHOLEST SERPL-MCNC: 165 MG/DL (ref 0–200)
CHOLEST/HDLC SERPL: 2 {RATIO}
CO2 SERPL-SCNC: 31 MMOL/L (ref 21–32)
CREAT SERPL-MCNC: 0.98 MG/DL (ref 0.55–1.02)
CV ECHO LV RWT: 0.42 CM
DIFFERENTIAL METHOD BLD: ABNORMAL
DOP CALC AO PEAK VEL: 1.41 M/S
DOP CALC AO VTI: 33 CM
DOP CALC LVOT AREA: 3 CM2
DOP CALC LVOT DIAMETER: 1.96 CM
DOP CALC LVOT PEAK VEL: 0.94 M/S
DOP CALC LVOT STROKE VOLUME: 71.47 CM3
DOP CALCLVOT PEAK VEL VTI: 23.7 CM
E WAVE DECELERATION TIME: 309.49 MSEC
E/A RATIO: 1.02
E/E' RATIO: 10.38 M/S
ECHO LV POSTERIOR WALL: 0.96 CM (ref 0.6–1.1)
EGFR (NO RACE VARIABLE) (RUSH/TITUS): 59 ML/MIN/1.73M2
EOSINOPHIL # BLD AUTO: 0.13 K/UL (ref 0–0.5)
EOSINOPHIL NFR BLD AUTO: 2.1 % (ref 1–4)
ERYTHROCYTE [DISTWIDTH] IN BLOOD BY AUTOMATED COUNT: 15.2 % (ref 11.5–14.5)
EST. AVERAGE GLUCOSE BLD GHB EST-MCNC: 97 MG/DL
FRACTIONAL SHORTENING: 60 % (ref 28–44)
GLOBULIN SER-MCNC: 3.1 G/DL (ref 2–4)
GLUCOSE SERPL-MCNC: 108 MG/DL (ref 74–106)
HBA1C MFR BLD HPLC: 5.5 % (ref 4.5–6.6)
HCT VFR BLD AUTO: 37.5 % (ref 38–47)
HDLC SERPL-MCNC: 82 MG/DL (ref 40–60)
HGB BLD-MCNC: 12.4 G/DL (ref 12–16)
IMM GRANULOCYTES # BLD AUTO: 0.02 K/UL (ref 0–0.04)
IMM GRANULOCYTES NFR BLD: 0.3 % (ref 0–0.4)
INTERVENTRICULAR SEPTUM: 0.95 CM (ref 0.6–1.1)
IVC DIAMETER: 1.28 CM
LA MINOR: 3.5 CM
LDLC SERPL CALC-MCNC: 74 MG/DL
LEFT INTERNAL DIMENSION IN SYSTOLE: 1.85 CM (ref 2.1–4)
LEFT VENTRICLE DIASTOLIC VOLUME INDEX: 46.8 ML/M2
LEFT VENTRICLE DIASTOLIC VOLUME: 95.94 ML
LEFT VENTRICLE MASS INDEX: 72 G/M2
LEFT VENTRICLE SYSTOLIC VOLUME INDEX: 5.1 ML/M2
LEFT VENTRICLE SYSTOLIC VOLUME: 10.5 ML
LEFT VENTRICULAR INTERNAL DIMENSION IN DIASTOLE: 4.57 CM (ref 3.5–6)
LEFT VENTRICULAR MASS: 147.58 G
LV LATERAL E/E' RATIO: 8.3 M/S
LV SEPTAL E/E' RATIO: 13.83 M/S
LVOT MG: 1.82 MMHG
LVOT MV: 0.63 CM/S
LYMPHOCYTES # BLD AUTO: 1.97 K/UL (ref 1–4.8)
LYMPHOCYTES NFR BLD AUTO: 31.4 % (ref 27–41)
MAGNESIUM SERPL-MCNC: 2.6 MG/DL (ref 1.7–2.3)
MCH RBC QN AUTO: 27.6 PG (ref 27–31)
MCHC RBC AUTO-ENTMCNC: 33.1 G/DL (ref 32–36)
MCV RBC AUTO: 83.5 FL (ref 80–96)
MONOCYTES # BLD AUTO: 0.61 K/UL (ref 0–0.8)
MONOCYTES NFR BLD AUTO: 9.7 % (ref 2–6)
MPC BLD CALC-MCNC: 10.5 FL (ref 9.4–12.4)
MV PEAK A VEL: 0.81 M/S
MV PEAK E VEL: 0.83 M/S
NEUTROPHILS # BLD AUTO: 3.49 K/UL (ref 1.8–7.7)
NEUTROPHILS NFR BLD AUTO: 55.5 % (ref 53–65)
NONHDLC SERPL-MCNC: 83 MG/DL
NRBC # BLD AUTO: 0 X10E3/UL
NRBC, AUTO (.00): 0 %
OHS LV EJECTION FRACTION SIMPSONS BIPLANE MOD: 55 %
PISA TR MAX VEL: 2.42 M/S
PLATELET # BLD AUTO: 258 K/UL (ref 150–400)
POTASSIUM SERPL-SCNC: 3.6 MMOL/L (ref 3.5–5.1)
PROT SERPL-MCNC: 6.4 G/DL (ref 6.4–8.2)
PV PEAK GRADIENT: 2 MMHG
PV PEAK VELOCITY: 0.75 M/S
RA PRESSURE ESTIMATED: 3 MMHG
RA VOL SYS: 38.86 ML
RBC # BLD AUTO: 4.49 M/UL (ref 4.2–5.4)
RIGHT ATRIAL AREA: 16.5 CM2
RIGHT VENTRICLE DIASTOLIC LENGTH: 5.9 CM
RIGHT VENTRICLE DIASTOLIC MID DIMENSION: 1.8 CM
RIGHT VENTRICULAR END-DIASTOLIC DIMENSION: 3.4 CM
RIGHT VENTRICULAR LENGTH IN DIASTOLE (APICAL 4-CHAMBER VIEW): 5.95 CM
RV MID DIAMA: 1.82 CM
RV TB RVSP: 5 MMHG
SODIUM SERPL-SCNC: 132 MMOL/L (ref 136–145)
TDI LATERAL: 0.1 M/S
TDI SEPTAL: 0.06 M/S
TDI: 0.08 M/S
TR MAX PG: 23 MMHG
TRICUSPID ANNULAR PLANE SYSTOLIC EXCURSION: 2.68 CM
TRIGL SERPL-MCNC: 44 MG/DL (ref 35–150)
TV REST PULMONARY ARTERY PRESSURE: 26 MMHG
VLDLC SERPL-MCNC: 9 MG/DL
WBC # BLD AUTO: 6.28 K/UL (ref 4.5–11)
Z-SCORE OF LEFT VENTRICULAR DIMENSION IN END DIASTOLE: -2.97
Z-SCORE OF LEFT VENTRICULAR DIMENSION IN END SYSTOLE: -5.62

## 2023-09-26 PROCEDURE — 99232 PR SUBSEQUENT HOSPITAL CARE,LEVL II: ICD-10-PCS | Mod: GC,,, | Performed by: FAMILY MEDICINE

## 2023-09-26 PROCEDURE — 92610 EVALUATE SWALLOWING FUNCTION: CPT

## 2023-09-26 PROCEDURE — 97162 PT EVAL MOD COMPLEX 30 MIN: CPT

## 2023-09-26 PROCEDURE — 85025 COMPLETE CBC W/AUTO DIFF WBC: CPT | Performed by: INTERNAL MEDICINE

## 2023-09-26 PROCEDURE — 11000001 HC ACUTE MED/SURG PRIVATE ROOM

## 2023-09-26 PROCEDURE — 99232 SBSQ HOSP IP/OBS MODERATE 35: CPT | Mod: GC,,, | Performed by: FAMILY MEDICINE

## 2023-09-26 PROCEDURE — 83036 HEMOGLOBIN GLYCOSYLATED A1C: CPT | Performed by: INTERNAL MEDICINE

## 2023-09-26 PROCEDURE — 80061 LIPID PANEL: CPT | Performed by: INTERNAL MEDICINE

## 2023-09-26 PROCEDURE — 80053 COMPREHEN METABOLIC PANEL: CPT | Performed by: INTERNAL MEDICINE

## 2023-09-26 PROCEDURE — 83735 ASSAY OF MAGNESIUM: CPT | Performed by: INTERNAL MEDICINE

## 2023-09-26 PROCEDURE — 97165 OT EVAL LOW COMPLEX 30 MIN: CPT

## 2023-09-26 PROCEDURE — 94761 N-INVAS EAR/PLS OXIMETRY MLT: CPT

## 2023-09-26 PROCEDURE — 25000003 PHARM REV CODE 250: Performed by: INTERNAL MEDICINE

## 2023-09-26 PROCEDURE — 92523 SPEECH SOUND LANG COMPREHEN: CPT

## 2023-09-26 RX ADMIN — ATORVASTATIN CALCIUM 80 MG: 80 TABLET, FILM COATED ORAL at 09:09

## 2023-09-26 RX ADMIN — GABAPENTIN 600 MG: 300 CAPSULE ORAL at 08:09

## 2023-09-26 RX ADMIN — PROPRANOLOL HYDROCHLORIDE 40 MG: 40 TABLET ORAL at 08:09

## 2023-09-26 RX ADMIN — PRIMIDONE 150 MG: 50 TABLET ORAL at 09:09

## 2023-09-26 RX ADMIN — SENNOSIDES AND DOCUSATE SODIUM 1 TABLET: 50; 8.6 TABLET ORAL at 09:09

## 2023-09-26 RX ADMIN — GABAPENTIN 300 MG: 300 CAPSULE ORAL at 09:09

## 2023-09-26 RX ADMIN — HYDROCODONE BITARTRATE AND ACETAMINOPHEN 1 TABLET: 10; 325 TABLET ORAL at 10:09

## 2023-09-26 RX ADMIN — PROPRANOLOL HYDROCHLORIDE 40 MG: 40 TABLET ORAL at 09:09

## 2023-09-26 RX ADMIN — PRIMIDONE 100 MG: 50 TABLET ORAL at 09:09

## 2023-09-26 RX ADMIN — PANTOPRAZOLE SODIUM 40 MG: 40 TABLET, DELAYED RELEASE ORAL at 09:09

## 2023-09-26 RX ADMIN — RIVAROXABAN 20 MG: 20 TABLET, FILM COATED ORAL at 09:09

## 2023-09-26 RX ADMIN — ASPIRIN 81 MG: 81 TABLET, COATED ORAL at 09:09

## 2023-09-26 RX ADMIN — DESVENLAFAXINE 100 MG: 50 TABLET, FILM COATED, EXTENDED RELEASE ORAL at 09:09

## 2023-09-26 RX ADMIN — HYDROCODONE BITARTRATE AND ACETAMINOPHEN 1 TABLET: 10; 325 TABLET ORAL at 06:09

## 2023-09-26 RX ADMIN — PRAMIPEXOLE DIHYDROCHLORIDE 0.25 MG: 0.25 TABLET ORAL at 08:09

## 2023-09-26 NOTE — PLAN OF CARE
Problem: Physical Therapy  Goal: Physical Therapy Goal  Description: Short Term Goals  Ambulate SBA - 100 feet with rolling walker assistive device.   Supine to sit contact guard  Sit to stand contact guard  SPT contact guard  5. Independent with HEP    Long Term Goals  Ambulate SBA - 300 feet with none assistive device.   Supine to sit independent without device  Sit to stand independent without device  SPT independent without device  Needed equipment for home.         Outcome: Ongoing, Progressing

## 2023-09-26 NOTE — PLAN OF CARE
SS was notified pt will need swb. Pt choice obtained for [facility]. SS called and left Paulina a message to start precert.

## 2023-09-26 NOTE — PT/OT/SLP EVAL
Occupational Therapy Evaluation     Name: Felicia Keita  MRN: [id number]  Admitting Diagnosis: Acute ischemic stroke  Recent Surgery: * No surgery found *      Recommendations:     Discharge Recommendations: rehabilitation facility, nursing facility, skilled  Level of Assistance Recommended: 24 hours light assistance  Discharge Equipment Recommendations: none  Barriers to discharge:      Assessment:     Felicia Keita is a 78 y.o. female with a medical diagnosis of Acute ischemic stroke. She presents with performance deficits affecting function including weakness, impaired endurance, impaired self care skills, impaired functional mobility, gait instability, impaired balance, impaired cognition, decreased safety awareness. Pt admitted with decreased ability to speak, decreased ability to ambulate with (R) UE numbness. Pt is wanting to return home today, but has decreased balance and is at a high risk to fall. Pt's daughter is not able to assist pt. Recommend swing bed or inpt rehab at D/C.    Rehab Prognosis: Good; patient would benefit from acute OT services to address these deficits and reach maximum level of function.      Plan:     Patient to be seen 5 x/week to address the above listed problems via self-care/home management, therapeutic activities, therapeutic exercises  Plan of Care Expires: 10/10/23  Plan of Care Reviewed with: patient    Subjective     Chief Complaint: weakness of (L) LE , decreased balance, and difficulty speaking, and numbness affecting (R) hand  Patient Comments/Goals: Pt wants to be able to return home  Pain/Comfort:  Pain Rating 1: 3/10  Location - Orientation 1: lower  Location 1: back  Pain Addressed 1: Pre-medicate for activity, Reposition, Distraction, Cessation of Activity  Pain Rating Post-Intervention 1: 2/10 (Pt with chronic low back pain)    Patients cultural, spiritual, Orthodox conflicts given the current situation: no    Social History:  Living Environment: Patient lives with  their daughter who is disabled from strokes and aneurysm  in a single story home with walk-in shower and step up to enter.  Prior Level of Function: Prior to admission, patient was independent.  Roles and Routines: Patient was driving, retired, and taking care of her daughter  prior to admission.  Equipment Used at Home: cane, straight, rollator  DME owned (not currently used): rolling walker and single point cane  Assistance Upon Discharge: facility staff    Objective:     Communicated with BARBER Zayas prior to session. Patient found supine with pulse ox (continuous), PureWick, telemetry, blood pressure cuff, peripheral IV upon OT entry to room.    General Precautions: Standard, fall   Orthopedic Precautions: N/A   Braces: N/A    Respiratory Status: Room air    Cognitive and Psychosocial Function:  Oriented to: Person, Place, and Situation  Follows Commands/Attention: follows one-step commands  Communication: generally clear and fluent with occasional difficulty with word recall  Memory:  mildly impaired immediate recall  Safety Awareness/Insight to Disability: impaired  Mood/Affect/Coping Skills/Emotional Control: Cooperative, Happy, and Pleasant    Hearing: Intact    Vision: No deficits noted      Physical Exam:  Postural examination/scapula alignment:    -       Rounded shoulders  Skin integrity: Visible skin intact  Hand dominance: Right        Left UE Right UE   UE Edema None noted None noted   UE ROM AROM WFL AROM WFL   UE Strength 4/5 mildly decreased    Strength grasp WFL grasp WFL   Sensation LUE  INTACT:  Mild tingling affecting (R) hand RUE  INTACT:  WFL   Fine Motor Coordination:  LUE  INTACT:  hand, finger to nose and hand thumb/finger opposition skills RUE  IMPAIRED:    hand, finger to nose   Gross Motor Coordination: LUE  INTACT:  Pt with essential tremor RUE  INTACT:  Pt with essential tremor     Occupational Performance    Gait belt applied - Yes    Bed Mobility:   Supine to sit from  right side of bed with supervision and increased time and effort  Sit to Supine with modified independence on right side of bed    Functional Mobility/Transfers  Static Sitting EOB: supervision  Dynamic Sitting EOB: supervision  Static Standing Balance: minimum assistance  Dynamic Standing Balance: minimum assistance and moderate assistance  Sit <> Stand Transfer with contact guard assistance and minimum assistance with hand-held assist  Functionality Mobility: Pt ambulated with slow steps, much trunk sway as she attempted to balance herself. Pt required hand held assist and min(A) with 2 episodes of LOB to the (L) with mod(A) to correct    Activities of Daily Living:  Feeding: independence  Lower Body Dressing: modified independence for socks    AMPA 6 Click ADL:  AMPA Total Score: 21    Treatment & Education:  Therapist provided facilitation and instruction of proper body mechanics, energy conservation, and fall prevention strategies during tasks listed above.  Patient educated on role of OT, POC, and goals for therapy.  Patient educated on importance of OOB activities with staff member assistance and sitting OOB majority of the day.      Patient clear to ambulate to/from bathroom with RN/PCT, assist x1 or 2 .    Patient left HOB elevated with all lines intact, call button in reach, RN notified, and bed alarm on.    GOALS:   Multidisciplinary Problems       Occupational Therapy Goals          Problem: Occupational Therapy    Goal Priority Disciplines Outcome Interventions   Occupational Therapy Goal     OT, PT/OT Ongoing, Progressing    Description: STG: (in 1 week pt will)  Pt will perform grooming with setup  Pt will bathe with setup and SBA  Pt will perform UE dressing with with setup  Pt will perform LE dressing with with SBA  Pt will perform toileting with supervision/ SBA  Pt will transfer bed/chair/bsc with RW and SBA  Pt will perform standing task x 5 min with SBA with RW  Pt will tolerate 20 minutes of  tx without fatigue      LTG: (in 3 weeks)  1.Restore to max I with self care and mobility.                          History:     Past Medical History:   Diagnosis Date    Abnormal gait 05/22/2013    Actinic keratosis 08/12/2020    L forearm     Allergic rhinitis 04/07/2020    Blepharophimosis 04/22/2014    senile    Cervical somatic dysfunction 08/15/2017    Cervicalgia 03/28/2019    Chronic peripheral venous hypertension 06/16/2015    Chronic serous otitis media, bilateral 09/05/2019    Chronic venous hypertension (idiopathic) without complications of unspecified lower extremity 08/15/2017    Conjunctival hemorrhage, right eye 11/02/2020    Deep venous thrombosis of lower extremity 09/17/2012    Degeneration of cervical intervertebral disc 10/21/2011    Degeneration of lumbar intervertebral disc 10/21/2011    Degenerative joint disease involving multiple joints 07/25/2011    Depressive disorder 07/25/2011    Essential hypertension 08/12/2020    Essential tremor 06/24/2014    GERD without esophagitis 08/15/2017    Headache 04/07/2020    Hyperlipidemia 05/08/2012    Insomnia 04/06/2016    Osteoarthritis 01/04/2017    Osteoporosis 11/07/2017    Otalgia, right ear 10/17/2016    Other cervical disc degeneration, unspecified cervical region 08/15/2017    Overflow incontinence 01/06/2020    Pain in right knee 01/06/2020    osteoarthritis    Pain in right leg 09/10/2020    Peripheral arterial occlusive disease 02/24/2015    Peripheral vascular disease, unspecified 08/06/2019    Peripheral venous insufficiency 09/29/2015    Personal history of PE (pulmonary embolism) 08/06/2019    Presence of vena cava filter 03/2015    Pulmonary embolus 03/10/2015    Pulmonary infarction 03/16/2012    Pure hypercholesterolemia 07/25/2011    Restless legs 05/22/2018    Right temporomandibular joint disorder, unspecified 08/18/2020    Rosacea 07/01/2015    Sciatica, right side 07/06/2016    Seborrheic keratosis 01/07/2021    Segmental and  somatic dysfunction of thoracic region 03/28/2019    Senile osteoporosis 04/24/2017    Spasm of back muscles 07/25/2011    Trochanteric bursitis of right hip 01/06/2020    Unspecified urinary incontinence 02/07/2020    Vitamin D deficiency 10/27/2014         Past Surgical History:   Procedure Laterality Date    APPENDECTOMY      bone density  06/27/2019    Ajith/Abiel    CARPAL TUNNEL RELEASE      BLI    LUMBAR LAMINECTOMY      prolia  03/12/2019    1mg    remove cataract Right     insert lens    TONSILLECTOMY      TOTAL ABDOMINAL HYSTERECTOMY      VAGINAL DELIVERY      vascular surgery procedure       Right SSV Laser Ablation performed by Dr. Carlo hernandez screen  05/24/2018       Time Tracking:     OT Date of Treatment: 09/26/23  OT Start Time: 1100  OT Stop Time: 1132  OT Total Time (min): 32 min    Billable Minutes: Evaluation low complexity    9/26/2023

## 2023-09-26 NOTE — PLAN OF CARE
Problem: Occupational Therapy  Goal: Occupational Therapy Goal  Description: STG: (in 1 week pt will)  Pt will perform grooming with setup  Pt will bathe with setup and SBA  Pt will perform UE dressing with with setup  Pt will perform LE dressing with with SBA  Pt will perform toileting with supervision/ SBA  Pt will transfer bed/chair/bsc with RW and SBA  Pt will perform standing task x 5 min with SBA with RW  Pt will tolerate 20 minutes of tx without fatigue      LTG: (in 3 weeks)  1.Restore to max I with self care and mobility.     Outcome: Ongoing, Progressing     Pt admitted with difficulty with speech, difficulty ambulating, c/o new (L) LE weakness and new (R) hand tingling. Pt is improving from admission, but her balance is impaired and pt is at a high risk for falls. Pt would benefit from swingbed or inpt rehab at D/C if her balance is not dramatically improved prior to D/C.

## 2023-09-26 NOTE — SUBJECTIVE & OBJECTIVE
Interval History: Patient was seen in the bed. She reports feeling better. Speech is improved but weakness in the right leg still exist    Review of Systems   Neurological:  Positive for speech difficulty, weakness and numbness.   All other systems reviewed and are negative.    Objective:     Vital Signs (Most Recent):  Temp: 98 °F (36.7 °C) (09/26/23 1159)  Pulse: 64 (09/26/23 1159)  Resp: 17 (09/26/23 1159)  BP: 124/67 (09/26/23 1057)  SpO2: 96 % (09/26/23 1057) Vital Signs (24h Range):  Temp:  [97.9 °F (36.6 °C)-98.2 °F (36.8 °C)] 98 °F (36.7 °C)  Pulse:  [56-75] 64  Resp:  [13-18] 17  SpO2:  [92 %-98 %] 96 %  BP: (102-177)/(44-98) 124/67     Weight: 93.4 kg (206 lb)  Body mass index is 32.26 kg/m².    Intake/Output Summary (Last 24 hours) at 9/26/2023 1259  Last data filed at 9/26/2023 1100  Gross per 24 hour   Intake --   Output 1200 ml   Net -1200 ml         Physical Exam  Vitals and nursing note reviewed.   Constitutional:       Appearance: Normal appearance.   HENT:      Head: Normocephalic and atraumatic.      Mouth/Throat:      Mouth: Mucous membranes are moist.   Eyes:      Extraocular Movements: Extraocular movements intact.      Pupils: Pupils are equal, round, and reactive to light.   Cardiovascular:      Rate and Rhythm: Normal rate and regular rhythm.   Pulmonary:      Effort: Pulmonary effort is normal.      Breath sounds: Normal breath sounds.   Abdominal:      General: Bowel sounds are normal.      Palpations: Abdomen is soft.   Musculoskeletal:         General: Normal range of motion.      Cervical back: Normal range of motion and neck supple.   Neurological:      Mental Status: She is alert.      Cranial Nerves: Facial asymmetry present.      Motor: Weakness and tremor present.      Coordination: Coordination normal. Finger-Nose-Finger Test and Heel to Shin Test normal. Rapid alternating movements normal.      Gait: Gait abnormal.   Psychiatric:         Mood and Affect: Mood normal.          Behavior: Behavior normal.             Significant Labs: All pertinent labs within the past 24 hours have been reviewed.    Significant Imaging: I have reviewed all pertinent imaging results/findings within the past 24 hours.

## 2023-09-26 NOTE — PT/OT/SLP EVAL
Physical Therapy Evaluation    Patient Name:  Felicia Keita   MRN:  [id number]    Recommendations:     Discharge Recommendations: outpatient PT   Discharge Equipment Recommendations: none   Barriers to discharge: Decreased support at home    Assessment:     Felicia Keita is a 78 y.o. female admitted with a medical diagnosis of Acute ischemic stroke.  She presents with the following impairments/functional limitations: impaired balance, gait instability, decreased ROM, pain Patient with balance and LE weakness from cva. Patient is high fall risk and is not ready to return home at this point. Patient is in understanding and wishes to go to swingbed until safe to return home. .    Rehab Prognosis: Good; patient would benefit from acute skilled PT services to address these deficits and reach maximum level of function.    Recent Surgery: * No surgery found *      Plan:     During this hospitalization, patient to be seen 5 x/week to address the identified rehab impairments via gait training, therapeutic activities, therapeutic exercises and progress toward the following goals:    Plan of Care Expires:  10/26/23    Subjective     Chief Complaint: CVA  Patient/Family Comments/goals: Patient states speech and gait difficulty since ysterday.   Pain/Comfort:  Pain Rating 1: 3/10  Location 1: back  Pain Addressed 1: Cessation of Activity    Patients cultural, spiritual, Yazidism conflicts given the current situation:      Living Environment:  Lives with daughter  Prior to admission, patients level of function was independent community ambulation.  Equipment used at home: cane, straight, walker, rolling.  DME owned (not currently used): rolling walker.  Upon discharge, patient will have assistance from daughter.    Objective:     Communicated with nurse prior to session.  Patient found supine with    upon PT entry to room.    General Precautions: Standard, fall  Orthopedic Precautions:    Braces:    Respiratory Status: Room  air    Exams:  Cognitive Exam:  Patient is oriented to Person, Place, Time, and Situation  Sensation:    -       Intact  RLE ROM: WNL  RLE Strength: 3/5  LLE ROM: WFL  LLE Strength: 4/5    Functional Mobility:  Bed Mobility:     Supine to Sit: contact guard assistance  Sit to Supine: minimum assistance  Transfers:     Sit to Stand:  minimum assistance with rolling walker  Gait: ambulated 20 feet mod assist, 90% decreased [place], poor balance requiring assist to prevent fall      AM-PAC 6 CLICK MOBILITY  Total Score:18       Treatment & Education:  Call for assist oob    Patient left supine with all lines intact and bed alarm on.    GOALS:   Multidisciplinary Problems       Physical Therapy Goals          Problem: Physical Therapy    Goal Priority Disciplines Outcome Goal Variances Interventions   Physical Therapy Goal     PT, PT/OT Ongoing, Progressing     Description: Short Term Goals  Ambulate SBA - 100 feet with rolling walker assistive device.   Supine to sit contact guard  Sit to stand contact guard  SPT contact guard  5. Independent with HEP    Long Term Goals  Ambulate SBA - 300 feet with none assistive device.   Supine to sit independent without device  Sit to stand independent without device  SPT independent without device  Needed equipment for home.                              History:     Past Medical History:   Diagnosis Date    Abnormal gait 05/22/2013    Actinic keratosis 08/12/2020    L forearm     Allergic rhinitis 04/07/2020    Blepharophimosis 04/22/2014    senile    Cervical somatic dysfunction 08/15/2017    Cervicalgia 03/28/2019    Chronic peripheral venous hypertension 06/16/2015    Chronic serous otitis media, bilateral 09/05/2019    Chronic venous hypertension (idiopathic) without complications of unspecified lower extremity 08/15/2017    Conjunctival hemorrhage, right eye 11/02/2020    Deep venous thrombosis of lower extremity 09/17/2012    Degeneration of cervical intervertebral disc  10/21/2011    Degeneration of lumbar intervertebral disc 10/21/2011    Degenerative joint disease involving multiple joints 07/25/2011    Depressive disorder 07/25/2011    Essential hypertension 08/12/2020    Essential tremor 06/24/2014    GERD without esophagitis 08/15/2017    Headache 04/07/2020    Hyperlipidemia 05/08/2012    Insomnia 04/06/2016    Osteoarthritis 01/04/2017    Osteoporosis 11/07/2017    Otalgia, right ear 10/17/2016    Other cervical disc degeneration, unspecified cervical region 08/15/2017    Overflow incontinence 01/06/2020    Pain in right knee 01/06/2020    osteoarthritis    Pain in right leg 09/10/2020    Peripheral arterial occlusive disease 02/24/2015    Peripheral vascular disease, unspecified 08/06/2019    Peripheral venous insufficiency 09/29/2015    Personal history of PE (pulmonary embolism) 08/06/2019    Presence of vena cava filter 03/2015    Pulmonary embolus 03/10/2015    Pulmonary infarction 03/16/2012    Pure hypercholesterolemia 07/25/2011    Restless legs 05/22/2018    Right temporomandibular joint disorder, unspecified 08/18/2020    Rosacea 07/01/2015    Sciatica, right side 07/06/2016    Seborrheic keratosis 01/07/2021    Segmental and somatic dysfunction of thoracic region 03/28/2019    Senile osteoporosis 04/24/2017    Spasm of back muscles 07/25/2011    Trochanteric bursitis of right hip 01/06/2020    Unspecified urinary incontinence 02/07/2020    Vitamin D deficiency 10/27/2014       Past Surgical History:   Procedure Laterality Date    APPENDECTOMY      bone density  06/27/2019    Ajith/Abiel    CARPAL TUNNEL RELEASE      BLI    LUMBAR LAMINECTOMY      prolia  03/12/2019    1mg    remove cataract Right     insert lens    TONSILLECTOMY      TOTAL ABDOMINAL HYSTERECTOMY      VAGINAL DELIVERY      vascular surgery procedure       Right SSV Laser Ablation performed by Dr. Carlo hernandez screen  05/24/2018       Time Tracking:     PT Received On: 09/26/23  PT Start Time:  1050     PT Stop Time: 1115  PT Total Time (min): 25 min     Billable Minutes: Evaluation 20 09/26/2023

## 2023-09-26 NOTE — PROGRESS NOTES
[facility] - Emergency Department  Hospital Medicine  Progress Note    Patient Name: Felicia Keita  MRN: [id number]  Patient Class: IP- Inpatient   Admission Date: 9/25/2023  Length of Stay: 1 days  Attending Physician: Mag Torre DO  Primary Care Provider: Alfonso Wagoner DO        Subjective:     Principal Problem:Acute ischemic stroke        HPI:  Ms. 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 Per patient, she was in usual state around 5PM yesterday and then suddenly she noted to have work finding difficulty associated with unsteady gait. Her speech was slurred and she felt right leg heavier with numbness. Her symptoms persisted through the evening but she did not seek medical attention thinking that this will get better. Next day (which is today), she woke up with slightly worsening of her speech and her weakness and unsteady gait persisted so she presented to ED. No reports of head trauma, seizure like activity, fever, chills. She denies any right arm weakness. Her speech has slightly improved since she has been in the ED. She reports a history of mini-stroke a few years ago without residual deficits. She is very functional at baseline, she drives and takes care of her daughter who has history of aneurysm and strokes.       Overview/Hospital Course:  No notes on file    Interval History: Patient was seen in the bed. She reports feeling better. Speech is improved but weakness in the right leg still exist    Review of Systems   Neurological:  Positive for speech difficulty, weakness and numbness.   All other systems reviewed and are negative.    Objective:     Vital Signs (Most Recent):  Temp: 98 °F (36.7 °C) (09/26/23 1159)  Pulse: 64 (09/26/23 1159)  Resp: 17 (09/26/23 1159)  BP: 124/67 (09/26/23 1057)  SpO2: 96 %  (09/26/23 1057) Vital Signs (24h Range):  Temp:  [97.9 °F (36.6 °C)-98.2 °F (36.8 °C)] 98 °F (36.7 °C)  Pulse:  [56-75] 64  Resp:  [13-18] 17  SpO2:  [92 %-98 %] 96 %  BP: (102-177)/(44-98) 124/67     Weight: 93.4 kg (206 lb)  Body mass index is 32.26 kg/m².    Intake/Output Summary (Last 24 hours) at 9/26/2023 1259  Last data filed at 9/26/2023 1100  Gross per 24 hour   Intake --   Output 1200 ml   Net -1200 ml         Physical Exam  Vitals and nursing note reviewed.   Constitutional:       Appearance: Normal appearance.   HENT:      Head: Normocephalic and atraumatic.      Mouth/Throat:      Mouth: Mucous membranes are moist.   Eyes:      Extraocular Movements: Extraocular movements intact.      Pupils: Pupils are equal, round, and reactive to light.   Cardiovascular:      Rate and Rhythm: Normal rate and regular rhythm.   Pulmonary:      Effort: Pulmonary effort is normal.      Breath sounds: Normal breath sounds.   Abdominal:      General: Bowel sounds are normal.      Palpations: Abdomen is soft.   Musculoskeletal:         General: Normal range of motion.      Cervical back: Normal range of motion and neck supple.   Neurological:      Mental Status: She is alert.      Cranial Nerves: Facial asymmetry present.      Motor: Weakness and tremor present.      Coordination: Coordination normal. Finger-Nose-Finger Test and Heel to Shin Test normal. Rapid alternating movements normal.      Gait: Gait abnormal.   Psychiatric:         Mood and Affect: Mood normal.         Behavior: Behavior normal.             Significant Labs: All pertinent labs within the past 24 hours have been reviewed.    Significant Imaging: I have reviewed all pertinent imaging results/findings within the past 24 hours.      Assessment/Plan:      * Acute ischemic stroke  Presented with 1 day onset of aphasia, right lower extremity weakness and unsteady gait.   CT head showed an acute area of infarct in the posterior left frontal lobe.   CTA head &  neck showed no intracranial large vessel occlusion, no intracranial aneurysm or significant stenosis. Atherosclerotic disease seen along the aortic arch and in the carotid bifurcations without significant stenosis.   Tele-stroke neurologist consulted in ED, recommend admission for MRI brain without contrast, echo with bubble study- ordered.     MRI of brain: Acute to subacute area of infarct of the left posterior insular cortex and left posterior frontal lobe as described. Mild chronic microvascular ischemic change. No mass effect, midline shift, hemorrhage, or herniation.    PT OT on board    Antithrombotics for secondary stroke prevention: Antiplatelets: Aspirin: 81 mg daily  Anticoagulants: Rivaroxaban 20 mg daily    Statins for secondary stroke prevention and hyperlipidemia, if present:   Statins: Atorvastatin- 80 mg daily (increased from home dose of 40 mg, ordered lipid panel).     Aggressive risk factor modification: HTN, HLD     Rehab efforts: The patient has been evaluated by a stroke team provider and the therapy needs have been fully considered based off the presenting complaints and exam findings. The following therapy evaluations are needed: PT evaluate and treat, OT evaluate and treat, SLP evaluate and treat    Diagnostics ordered/pending: Carotid ultrasound to assess vasculature, CT scan of head without contrast to asses brain parenchyma, CTA Head to assess vasculature , CTA Neck/Arch to assess vasculature, HgbA1C to assess blood glucose levels, Lipid Profile to assess cholesterol levels, MRI head without contrast to assess brain parenchyma, TTE to assess cardiac function/status , TSH to assess thyroid function    VTE prophylaxis: Mechanical prophylaxis: Place SCDs    BP parameters: Infarct: No intervention, SBP <220        Chronic pain syndrome   reviewed: Resume home Norco, tizanidine and gabapentin.       Nodule of parotid gland  Incidental finding.   CTA head/neck showed nodule in the right  superficial parotid gland measuring 1.4 cm favors a primary parotid neoplasm.\  Recommend ENT evaluation as outpatient.       Recurrent deep vein thrombosis (DVT)  Resume home Xarelto.       Essential tremor  Resume home propranolol and primidone.       Chronic obstructive pulmonary disease  Stable without exacerbation.  PRN albuterol inhaler.       Hyperlipidemia  Resume home statin- dose increased given CVA.       GERD without esophagitis  Resume home oral PPI.       Uncontrolled hypertension  BP elevated on admission.  Allow permissive HTN for 24 hours.   Resume home amlodipine/losartan once out of window.       Restless legs  Resume home pramipexole.       Depression  Patient has recurrent depression which is moderate and is currently controlled. Will Continue anti-depressant medications. We will not consult psychiatry at this time. Patient does not display psychosis at this time. Continue to monitor closely and adjust plan of care as needed.          VTE Risk Mitigation (From admission, onward)         Ordered     rivaroxaban tablet 20 mg  Daily         09/25/23 1329     Reason for No Pharmacological VTE Prophylaxis  Once        Question:  Reasons:  Answer:  Already adequately anticoagulated on oral Anticoagulants    09/25/23 1329     IP VTE HIGH RISK PATIENT  Once         09/25/23 1329     Place sequential compression device  Until discontinued         09/25/23 1329                Discharge Planning   GINO:      Code Status: Full Code   Is the patient medically ready for discharge?:     Reason for patient still in hospital (select all that apply): Treatment  Discharge Plan A: Home with family                  Mervin Crespo MD  Department of Hospital Medicine   [facility] - Emergency Department

## 2023-09-26 NOTE — ASSESSMENT & PLAN NOTE
Presented with 1 day onset of aphasia, right lower extremity weakness and unsteady gait.   CT head showed an acute area of infarct in the posterior left frontal lobe.   CTA head & neck showed no intracranial large vessel occlusion, no intracranial aneurysm or significant stenosis. Atherosclerotic disease seen along the aortic arch and in the carotid bifurcations without significant stenosis.   Tele-stroke neurologist consulted in ED, recommend admission for MRI brain without contrast, echo with bubble study- ordered.     MRI of brain: Acute to subacute area of infarct of the left posterior insular cortex and left posterior frontal lobe as described. Mild chronic microvascular ischemic change. No mass effect, midline shift, hemorrhage, or herniation.    PT OT on board    Antithrombotics for secondary stroke prevention: Antiplatelets: Aspirin: 81 mg daily  Anticoagulants: Rivaroxaban 20 mg daily    Statins for secondary stroke prevention and hyperlipidemia, if present:   Statins: Atorvastatin- 80 mg daily (increased from home dose of 40 mg, ordered lipid panel).     Aggressive risk factor modification: HTN, HLD     Rehab efforts: The patient has been evaluated by a stroke team provider and the therapy needs have been fully considered based off the presenting complaints and exam findings. The following therapy evaluations are needed: PT evaluate and treat, OT evaluate and treat, SLP evaluate and treat    Diagnostics ordered/pending: Carotid ultrasound to assess vasculature, CT scan of head without contrast to asses brain parenchyma, CTA Head to assess vasculature , CTA Neck/Arch to assess vasculature, HgbA1C to assess blood glucose levels, Lipid Profile to assess cholesterol levels, MRI head without contrast to assess brain parenchyma, TTE to assess cardiac function/status , TSH to assess thyroid function    VTE prophylaxis: Mechanical prophylaxis: Place SCDs    BP parameters: Infarct: No intervention, SBP <220

## 2023-09-26 NOTE — PLAN OF CARE
[facility] - Emergency Department  Initial Discharge Assessment       Primary Care Provider: Alfonso Wagoner DO    Admission Diagnosis: Acute CVA (cerebrovascular accident) [I63.9]    Admission Date: 9/25/2023  Expected Discharge Date:     Transition of Care Barriers: None    Payor: HU[employer] MANAGED MEDICARE / Plan: HU[employer] SNP HMO PPO SPECIAL NEEDS / Product Type: Medicare Advantage /     Extended Emergency Contact Information  Primary Emergency Contact: Robert Keita  Mobile Phone: [phone]  Relation: Son  Secondary Emergency Contact: Felicia Renner  Quinn Phone: [phone]  Relation: Daughter  Preferred language: English   needed? No    Discharge Plan A: Home with family  Discharge Plan B: Home with family      [facility] - [address] [address]  Phone: [phone] Fax: [phone]      Initial Assessment (most recent)       Adult Discharge Assessment - 09/26/23 1049          Discharge Assessment    Assessment Type Discharge Planning Assessment     Source of Information patient     Communicated GINO with patient/caregiver Date not available/Unable to determine     People in Home child(dex), adult     Do you expect to return to your current living situation? Yes     Do you have help at home or someone to help you manage your care at home? Yes     Who are your caregiver(s) and their phone number(s)? Felicia Renner- daughter [phone]     Prior to hospitilization cognitive status: Unable to Assess     Current cognitive status: Alert/Oriented     Walking or Climbing Stairs ambulation difficulty, requires equipment     Mobility Management cane/ walker     Dressing/Bathing --   none    Home Accessibility wheelchair accessible     Home Layout Able to live on 1st floor     Equipment Currently Used at Home cane, straight;rollator     Patient currently being followed by outpatient case management? No     Do you currently have service(s) that help  you manage your care at home? No     Do you take prescription medications? Yes     Do you have prescription coverage? Yes     Coverage [employer]     Do you have any problems affording any of your prescribed medications? No     Is the patient taking medications as prescribed? yes     Who is going to help you get home at discharge? son     How do you get to doctors appointments? family or friend will provide;car, drives self     Are you on dialysis? No     Do you take coumadin? No     DME Needed Upon Discharge  none     Discharge Plan discussed with: Patient     Transition of Care Barriers None     Discharge Plan A Home with family     Discharge Plan B Home with family        Physical Activity    On average, how many days per week do you engage in moderate to strenuous exercise (like a brisk walk)? 0 days     On average, how many minutes do you engage in exercise at this level? 0 min        Financial Resource Strain    How hard is it for you to pay for the very basics like food, housing, medical care, and heating? Not hard at all        Housing Stability    In the last 12 months, was there a time when you were not able to pay the mortgage or rent on time? No     In the last 12 months, how many places have you lived? 1     In the last 12 months, was there a time when you did not have a steady place to sleep or slept in a shelter (including now)? No        Transportation Needs    In the past 12 months, has lack of transportation kept you from medical appointments or from getting medications? No     In the past 12 months, has lack of transportation kept you from meetings, work, or from getting things needed for daily living? No        Food Insecurity    Within the past 12 months, you worried that your food would run out before you got the money to buy more. Never true     Within the past 12 months, the food you bought just didn't last and you didn't have money to get more. Never true        Stress    Do you feel stress -  tense, restless, nervous, or anxious, or unable to sleep at night because your mind is troubled all the time - these days? Only a little        Social Connections    In a typical week, how many times do you talk on the phone with family, friends, or neighbors? More than three times a week     How often do you get together with friends or relatives? More than three times a week     How often do you attend Buddhism or Orthodox services? Never     Do you belong to any clubs or organizations such as Buddhism groups, unions, fraternal or athletic groups, or school groups? No     How often do you attend meetings of the clubs or organizations you belong to? Never     Are you , , , , never , or living with a partner?         Alcohol Use    Q1: How often do you have a drink containing alcohol? Never     Q2: How many drinks containing alcohol do you have on a typical day when you are drinking? Patient does not drink     Q3: How often do you have six or more drinks on one occasion? Never                        SS spoke with pt in the ER. Pt lives at home with her daughter. SS was consulted on pt for discharge planning. Pt wants to return home and do outpt therapy. Denies any needs. IM obtained.

## 2023-09-26 NOTE — PT/OT/SLP EVAL
Speech Language Pathology Evaluation  Cognitive/Bedside Swallow    Patient Name:  Felicia Keita   MRN:  [id number]  Admitting Diagnosis: Acute ischemic stroke    Recommendations:                  General Recommendations:  Follow-up not indicated  Diet recommendations:  Regular, Thin   Aspiration Precautions: 1 bite/sip at a time, HOB to 90 degrees, Remain upright 30 minutes post meal, Small bites/sips, and Standard aspiration precautions   General Precautions: Standard,    Communication strategies:  none    Assessment:     Felicia Keita is a 78 y.o. female with an SLP diagnosis of  acute ischemic stroke .  She presents with no difficulties with speech/language/cognition or swallowing at this time. Any symptoms she was having have resolved.    History:     Past Medical History:   Diagnosis Date    Abnormal gait 05/22/2013    Actinic keratosis 08/12/2020    L forearm     Allergic rhinitis 04/07/2020    Blepharophimosis 04/22/2014    senile    Cervical somatic dysfunction 08/15/2017    Cervicalgia 03/28/2019    Chronic peripheral venous hypertension 06/16/2015    Chronic serous otitis media, bilateral 09/05/2019    Chronic venous hypertension (idiopathic) without complications of unspecified lower extremity 08/15/2017    Conjunctival hemorrhage, right eye 11/02/2020    Deep venous thrombosis of lower extremity 09/17/2012    Degeneration of cervical intervertebral disc 10/21/2011    Degeneration of lumbar intervertebral disc 10/21/2011    Degenerative joint disease involving multiple joints 07/25/2011    Depressive disorder 07/25/2011    Essential hypertension 08/12/2020    Essential tremor 06/24/2014    GERD without esophagitis 08/15/2017    Headache 04/07/2020    Hyperlipidemia 05/08/2012    Insomnia 04/06/2016    Osteoarthritis 01/04/2017    Osteoporosis 11/07/2017    Otalgia, right ear 10/17/2016    Other cervical disc degeneration, unspecified cervical region 08/15/2017    Overflow incontinence 01/06/2020    Pain in  right knee 01/06/2020    osteoarthritis    Pain in right leg 09/10/2020    Peripheral arterial occlusive disease 02/24/2015    Peripheral vascular disease, unspecified 08/06/2019    Peripheral venous insufficiency 09/29/2015    Personal history of PE (pulmonary embolism) 08/06/2019    Presence of vena cava filter 03/2015    Pulmonary embolus 03/10/2015    Pulmonary infarction 03/16/2012    Pure hypercholesterolemia 07/25/2011    Restless legs 05/22/2018    Right temporomandibular joint disorder, unspecified 08/18/2020    Rosacea 07/01/2015    Sciatica, right side 07/06/2016    Seborrheic keratosis 01/07/2021    Segmental and somatic dysfunction of thoracic region 03/28/2019    Senile osteoporosis 04/24/2017    Spasm of back muscles 07/25/2011    Trochanteric bursitis of right hip 01/06/2020    Unspecified urinary incontinence 02/07/2020    Vitamin D deficiency 10/27/2014       Past Surgical History:   Procedure Laterality Date    APPENDECTOMY      bone density  06/27/2019    Ajith/Abiel    CARPAL TUNNEL RELEASE      BLI    LUMBAR LAMINECTOMY      prolia  03/12/2019    1mg    remove cataract Right     insert lens    TONSILLECTOMY      TOTAL ABDOMINAL HYSTERECTOMY      VAGINAL DELIVERY      vascular surgery procedure       Right SSV Laser Ablation performed by Dr. Carlo hernandez screen  05/24/2018       Social History: Patient lives at home with her daughter. She is the primary caregiver for her daughter.    Prior Intubation HX:  n/a    Modified Barium Swallow: n/a    Chest X-Rays: see chart    Prior diet: Patient eats regular consistency foods at home.    Occupation/hobbies/homemaking: retired and now takes care of her daughter who had a stroke ~3 years ago.    Subjective     Patient lying in bed awake and alert. Patient reports no trouble swallowing and that all speech symptoms have resolved. However, patient still agreeable to SPEECH THERAPY Evaluation/BEDSIDE SWALLOW EVALUATION.  Patient goals: to go home      Pain/Comfort:  Pain Rating 1: 0/10    Respiratory Status: Room air    Objective:     Cognitive Status:    Attention No obvious deficits observed WFL  Orientation Oriented x4  Problem Solving No difficulties noted.  Reasoning Cause/effect comment No difficulties noted.       Receptive Language:   Comprehension:      WFL    Pragmatics:    normal    Expressive Language:  Verbal:    Patient able to answer questions and participate in conversation with no deficits at this time.        Motor Speech:  WFL    Voice:   WFL        Oral Musculature Evaluation  Secretion Management: adequate  Mucosal Quality: adequate  Oral Labial Strength and Mobility: WFL  Lingual Strength and Mobility: WF    Bedside Swallow Eval:   Consistencies Assessed:  Thin liquids No difficulties noted with trials of water via a straw. No overt s/s of aspiration noted on any trials.   Puree No difficulties noted with bites of pudding. No overt s/s of aspiration noted on any trials. Patient reported no difficulty with her breakfast tray this morning.       Oral Phase:   WFL    Pharyngeal Phase:   no overt clinical signs/symptoms of aspiration  no overt clinical signs/symptoms of pharyngeal dysphagia    Compensatory Strategies  None    Treatment: Rec continue a regular consistency diet with thin liquids as tolerated. Speech/language/cognition all WFL. Skilled SPEECH THERAPY not indicated.     Goals:   Multidisciplinary Problems       SLP Goals       Not on file                    Plan:     Patient to be seen:      Plan of Care expires:     Plan of Care reviewed with:      SLP Follow-Up:  No       Discharge recommendations:      Barriers to Discharge:  None    Time Tracking:     SLP Treatment Date:      Speech Start Time:  0950  Speech Stop Time:  1020     Speech Total Time (min):  30 min    Billable Minutes: Eval 20  and Eval Swallow and Oral Function 10    09/26/2023

## 2023-09-27 VITALS
RESPIRATION RATE: 18 BRPM | HEIGHT: 67 IN | TEMPERATURE: 98 F | SYSTOLIC BLOOD PRESSURE: 126 MMHG | WEIGHT: 205.94 LBS | HEART RATE: 67 BPM | BODY MASS INDEX: 32.32 KG/M2 | OXYGEN SATURATION: 94 % | DIASTOLIC BLOOD PRESSURE: 55 MMHG

## 2023-09-27 PROBLEM — R42 DIZZINESS: Status: ACTIVE | Noted: 2023-09-27

## 2023-09-27 LAB
ALBUMIN SERPL BCP-MCNC: 3 G/DL (ref 3.5–5)
ALBUMIN/GLOB SERPL: 1 {RATIO}
ALP SERPL-CCNC: 77 U/L (ref 55–142)
ALT SERPL W P-5'-P-CCNC: 25 U/L (ref 13–56)
ANION GAP SERPL CALCULATED.3IONS-SCNC: 9 MMOL/L (ref 7–16)
AST SERPL W P-5'-P-CCNC: 23 U/L (ref 15–37)
BASOPHILS # BLD AUTO: 0.07 K/UL (ref 0–0.2)
BASOPHILS NFR BLD AUTO: 1 % (ref 0–1)
BILIRUB SERPL-MCNC: 0.4 MG/DL (ref ?–1.2)
BUN SERPL-MCNC: 18 MG/DL (ref 7–18)
BUN/CREAT SERPL: 18 (ref 6–20)
CALCIUM SERPL-MCNC: 8.2 MG/DL (ref 8.5–10.1)
CHLORIDE SERPL-SCNC: 102 MMOL/L (ref 98–107)
CO2 SERPL-SCNC: 27 MMOL/L (ref 21–32)
CREAT SERPL-MCNC: 1 MG/DL (ref 0.55–1.02)
DIFFERENTIAL METHOD BLD: ABNORMAL
EGFR (NO RACE VARIABLE) (RUSH/TITUS): 57 ML/MIN/1.73M2
EOSINOPHIL # BLD AUTO: 0.25 K/UL (ref 0–0.5)
EOSINOPHIL NFR BLD AUTO: 3.5 % (ref 1–4)
ERYTHROCYTE [DISTWIDTH] IN BLOOD BY AUTOMATED COUNT: 15.2 % (ref 11.5–14.5)
GLOBULIN SER-MCNC: 3.1 G/DL (ref 2–4)
GLUCOSE SERPL-MCNC: 100 MG/DL (ref 74–106)
HCT VFR BLD AUTO: 38 % (ref 38–47)
HGB BLD-MCNC: 12.6 G/DL (ref 12–16)
IMM GRANULOCYTES # BLD AUTO: 0.02 K/UL (ref 0–0.04)
IMM GRANULOCYTES NFR BLD: 0.3 % (ref 0–0.4)
LYMPHOCYTES # BLD AUTO: 2.77 K/UL (ref 1–4.8)
LYMPHOCYTES NFR BLD AUTO: 39 % (ref 27–41)
MCH RBC QN AUTO: 27.8 PG (ref 27–31)
MCHC RBC AUTO-ENTMCNC: 33.2 G/DL (ref 32–36)
MCV RBC AUTO: 83.7 FL (ref 80–96)
MONOCYTES # BLD AUTO: 0.81 K/UL (ref 0–0.8)
MONOCYTES NFR BLD AUTO: 11.4 % (ref 2–6)
MPC BLD CALC-MCNC: 11.2 FL (ref 9.4–12.4)
NEUTROPHILS # BLD AUTO: 3.19 K/UL (ref 1.8–7.7)
NEUTROPHILS NFR BLD AUTO: 44.8 % (ref 53–65)
NRBC # BLD AUTO: 0 X10E3/UL
NRBC, AUTO (.00): 0 %
PLATELET # BLD AUTO: 256 K/UL (ref 150–400)
POTASSIUM SERPL-SCNC: 4.2 MMOL/L (ref 3.5–5.1)
PROT SERPL-MCNC: 6.1 G/DL (ref 6.4–8.2)
RBC # BLD AUTO: 4.54 M/UL (ref 4.2–5.4)
SODIUM SERPL-SCNC: 134 MMOL/L (ref 136–145)
WBC # BLD AUTO: 7.11 K/UL (ref 4.5–11)

## 2023-09-27 PROCEDURE — 99239 HOSP IP/OBS DSCHRG MGMT >30: CPT | Mod: ,,, | Performed by: STUDENT IN AN ORGANIZED HEALTH CARE EDUCATION/TRAINING PROGRAM

## 2023-09-27 PROCEDURE — 1111F DSCHRG MED/CURRENT MED MERGE: CPT | Mod: CPTII,,, | Performed by: STUDENT IN AN ORGANIZED HEALTH CARE EDUCATION/TRAINING PROGRAM

## 2023-09-27 PROCEDURE — 85025 COMPLETE CBC W/AUTO DIFF WBC: CPT | Performed by: INTERNAL MEDICINE

## 2023-09-27 PROCEDURE — 99239 PR HOSPITAL DISCHARGE DAY,>30 MIN: ICD-10-PCS | Mod: ,,, | Performed by: STUDENT IN AN ORGANIZED HEALTH CARE EDUCATION/TRAINING PROGRAM

## 2023-09-27 PROCEDURE — 25000003 PHARM REV CODE 250: Performed by: INTERNAL MEDICINE

## 2023-09-27 PROCEDURE — 1111F PR DISCHARGE MEDS RECONCILED W/ CURRENT OUTPATIENT MED LIST: ICD-10-PCS | Mod: CPTII,,, | Performed by: STUDENT IN AN ORGANIZED HEALTH CARE EDUCATION/TRAINING PROGRAM

## 2023-09-27 PROCEDURE — 97116 GAIT TRAINING THERAPY: CPT

## 2023-09-27 PROCEDURE — 80053 COMPREHEN METABOLIC PANEL: CPT | Performed by: INTERNAL MEDICINE

## 2023-09-27 PROCEDURE — 97110 THERAPEUTIC EXERCISES: CPT

## 2023-09-27 RX ORDER — ATORVASTATIN CALCIUM 80 MG/1
80 TABLET, FILM COATED ORAL DAILY
Qty: 90 TABLET | Refills: 0 | Status: SHIPPED | OUTPATIENT
Start: 2023-09-28 | End: 2023-12-11 | Stop reason: SDUPTHER

## 2023-09-27 RX ORDER — LOSARTAN POTASSIUM 100 MG/1
100 TABLET ORAL DAILY
Status: DISCONTINUED | OUTPATIENT
Start: 2023-09-27 | End: 2023-09-27 | Stop reason: HOSPADM

## 2023-09-27 RX ORDER — ASPIRIN 81 MG/1
81 TABLET ORAL DAILY
Qty: 30 TABLET | Refills: 0 | Status: SHIPPED | OUTPATIENT
Start: 2023-09-28 | End: 2023-10-28

## 2023-09-27 RX ADMIN — ASPIRIN 81 MG: 81 TABLET, COATED ORAL at 09:09

## 2023-09-27 RX ADMIN — HYDROCODONE BITARTRATE AND ACETAMINOPHEN 1 TABLET: 10; 325 TABLET ORAL at 09:09

## 2023-09-27 RX ADMIN — PROPRANOLOL HYDROCHLORIDE 40 MG: 40 TABLET ORAL at 09:09

## 2023-09-27 RX ADMIN — PRIMIDONE 100 MG: 50 TABLET ORAL at 09:09

## 2023-09-27 RX ADMIN — PANTOPRAZOLE SODIUM 40 MG: 40 TABLET, DELAYED RELEASE ORAL at 09:09

## 2023-09-27 RX ADMIN — GABAPENTIN 300 MG: 300 CAPSULE ORAL at 09:09

## 2023-09-27 RX ADMIN — DESVENLAFAXINE 100 MG: 50 TABLET, FILM COATED, EXTENDED RELEASE ORAL at 09:09

## 2023-09-27 RX ADMIN — SENNOSIDES AND DOCUSATE SODIUM 1 TABLET: 50; 8.6 TABLET ORAL at 09:09

## 2023-09-27 RX ADMIN — OXYCODONE HYDROCHLORIDE AND ACETAMINOPHEN 500 MG: 500 TABLET ORAL at 09:09

## 2023-09-27 RX ADMIN — ATORVASTATIN CALCIUM 80 MG: 80 TABLET, FILM COATED ORAL at 09:09

## 2023-09-27 NOTE — ASSESSMENT & PLAN NOTE
Incidental finding.   CTA head/neck showed nodule in the right superficial parotid gland measuring 1.4 cm favors a primary parotid neoplasm.\  Recommend ENT evaluation as outpatient.     Follow up with PCP

## 2023-09-27 NOTE — PROGRESS NOTES
"[facility] - [address] [place] [address]  Adult Nutrition  First Assessment Note         Reason for Assessment  Reason For Assessment: identified at risk by screening criteria (MST2)   Nutrition Risk Screen: no indicators present    Assessment and Plan  Patient assessed for MST2. She was admitted 9/25 for acute ischemic stroke.    Patient is 205 pounds with a BMI of 32.25 and is obese. Needs adjusted. She is currently near her usual body weight, chart review reveals no significant weight changes x 6 months and she appears well-nourished. Patient does not meet criteria for malnutrition per ASPEN guidelines at this time.    Patient is ordered a cardiac diet. Recommend continue current diet as able/appropriate and tolerated. Encourage good po intakes.    Last BM 9/25 per flowsheet.    Medications/labs reviewed. RD following.    Per MD:  "HPI: Ms. 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 Per patient, she was in usual state around 5PM yesterday and then suddenly she noted to have work finding difficulty associated with unsteady gait. Her speech was slurred and she felt right leg heavier with numbness. Her symptoms persisted through the evening but she did not seek medical attention thinking that this will get better. Next day (which is today), she woke up with slightly worsening of her speech and her weakness and unsteady gait persisted so she presented to ED. No reports of head trauma, seizure like activity, fever, chills. She denies any right arm weakness. Her speech has slightly improved since she has been in the ED. She reports a history of mini-stroke a few years ago without residual deficits. She is very functional at baseline, she drives and takes care of her daughter who has history of aneurysm and strokes. "    Malnutrition  Is " Patient Malnourished: No    Skin Integrity  Charlie Risk Assessment  Sensory Perception: 3-->slightly limited  Moisture: 4-->rarely moist  Activity: 3-->walks occasionally  Mobility: 3-->slightly limited  Nutrition: 3-->adequate  Friction and Shear: 3-->no apparent problem  Charlie Score: 19      Nutrition Diagnosis  Overweight related to Excessive Caloric intake as evidenced by elevated BMI      Nutrition Risk  Level of Risk/Frequency of Follow-up: low      Recent Labs   Lab 09/25/23  1001 09/25/23  1152 09/27/23  0238   GLU  --    < > 100   POCGLU 105  --   --     < > = values in this interval not displayed.         Nutrition Prescription / Recommendations  Recommendation/Intervention: Recomend continue current diet as able/appropriate and tolerated. Encourage good po intakes.  Goals: Weight maintenance, intake % of meals  Nutrition Goal Status: new  Current Diet Order: Cardiac  Chewing or Swallowing Difficulty?: No Chewing or swallowing difficulty  Recommended Diet: Heart Healthy  Recommended Oral Supplement: No Oral Supplements  Is Nutrition Support Recommended: [facility] Support Yes/No: No   Is Nutrition Education Recommended: No    Monitor and Evaluation  % current Intake: New Diet order with no P.O. intake documented currently  % intake to meet estimated needs: 75 - 100 %  Food and Nutrient Adminstration: diet order  Anthropometric Measurements: weight, weight change  Biochemical Data, Medical Tests and Procedures: electrolyte and renal panel, gastrointestinal profile, glucose/endocrine profile, inflammatory profile, lipid profile       Current Medical Diagnosis and Past Medical History  Diagnosis: stroke/CVA  Past Medical History:   Diagnosis Date    Abnormal gait 05/22/2013    Actinic keratosis 08/12/2020    L forearm     Allergic rhinitis 04/07/2020    Blepharophimosis 04/22/2014    senile    Cervical somatic dysfunction 08/15/2017    Cervicalgia 03/28/2019    Chronic peripheral venous  hypertension 06/16/2015    Chronic serous otitis media, bilateral 09/05/2019    Chronic venous hypertension (idiopathic) without complications of unspecified lower extremity 08/15/2017    Conjunctival hemorrhage, right eye 11/02/2020    Deep venous thrombosis of lower extremity 09/17/2012    Degeneration of cervical intervertebral disc 10/21/2011    Degeneration of lumbar intervertebral disc 10/21/2011    Degenerative joint disease involving multiple joints 07/25/2011    Depressive disorder 07/25/2011    Essential hypertension 08/12/2020    Essential tremor 06/24/2014    GERD without esophagitis 08/15/2017    Headache 04/07/2020    Hyperlipidemia 05/08/2012    Insomnia 04/06/2016    Osteoarthritis 01/04/2017    Osteoporosis 11/07/2017    Otalgia, right ear 10/17/2016    Other cervical disc degeneration, unspecified cervical region 08/15/2017    Overflow incontinence 01/06/2020    Pain in right knee 01/06/2020    osteoarthritis    Pain in right leg 09/10/2020    Peripheral arterial occlusive disease 02/24/2015    Peripheral vascular disease, unspecified 08/06/2019    Peripheral venous insufficiency 09/29/2015    Personal history of PE (pulmonary embolism) 08/06/2019    Presence of vena cava filter 03/2015    Pulmonary embolus 03/10/2015    Pulmonary infarction 03/16/2012    Pure hypercholesterolemia 07/25/2011    Restless legs 05/22/2018    Right temporomandibular joint disorder, unspecified 08/18/2020    Rosacea 07/01/2015    Sciatica, right side 07/06/2016    Seborrheic keratosis 01/07/2021    Segmental and somatic dysfunction of thoracic region 03/28/2019    Senile osteoporosis 04/24/2017    Spasm of back muscles 07/25/2011    Trochanteric bursitis of right hip 01/06/2020    Unspecified urinary incontinence 02/07/2020    Vitamin D deficiency 10/27/2014       Nutrition/Diet History  Spiritual, Cultural Beliefs, Rastafarian Practices, Values that Affect Care: no    Lab/Procedures/Meds  Recent Labs   Lab 09/27/23  0238  "  *   K 4.2   BUN 18   CREATININE 1.00   CALCIUM 8.2*   ALBUMIN 3.0*      ALT 25   AST 23   Note: Na+, Ca++, Alb low.     Last A1c:   Lab Results   Component Value Date    HGBA1C 5.5 09/26/2023     Lab Results   Component Value Date    RBC 4.54 09/27/2023    HGB 12.6 09/27/2023    HCT 38.0 09/27/2023    MCV 83.7 09/27/2023    MCH 27.8 09/27/2023    MCHC 33.2 09/27/2023     Pertinent Labs Reviewed: reviewed  Pertinent Medications Reviewed: reviewed  Scheduled Meds:   ascorbic acid (vitamin C)  500 mg Oral Daily    aspirin  81 mg Oral Daily    atorvastatin  80 mg Oral Daily    desvenlafaxine succinate  100 mg Oral Daily    gabapentin  300 mg Oral Daily    gabapentin  600 mg Oral QHS    pantoprazole  40 mg Oral Daily    pramipexole  0.25 mg Oral Nightly    primidone  100 mg Oral Daily    primidone  150 mg Oral QHS    propranoloL  40 mg Oral BID    rivaroxaban  20 mg Oral Daily    senna-docusate 8.6-50 mg  1 tablet Oral Daily     Continuous Infusions:  PRN Meds:.albuterol sulfate, HYDROcodone-acetaminophen, labetalol, ondansetron, polyethylene glycol, sodium chloride 0.9%, tiZANidine      Anthropometrics  Temp: 97.5 °F (36.4 °C)  Height Method: Stated  Height: 5' 7" (170.2 cm)  Height (inches): 67 in  Weight Method: Bed Scale  Weight: 93.4 kg (205 lb 14.6 oz)  Weight (lb): 205.91 lb  Ideal Body Weight (IBW), Female: 135 lb  % Ideal Body Weight, Female (lb): 152.53 %  BMI (Calculated): 32.2       Estimated/Assessed Needs  RMR ([place]-St. Jeor Equation): 1441.63     Temp: 97.5 °F (36.4 °C)Oral  Weight Used For Calorie Calculations: 69.4 kg (153 lb)     Energy Calorie Requirements (kcal): 1735-2082kcal (25-30kcal/kg Adj BW)  Weight Used For Protein Calculations: 69.4 kg (153 lb)  Protein Requirements: 70-83g (1.0-1.2g/kg adj BW)       RDA Method (mL): 1735       Nutrition by Nursing              Last Bowel Movement: 09/25/23                Nutrition Follow-Up  RD Follow-up?: Yes      Sandra Smith MS, RD, " LD  Available via Secure Chat

## 2023-09-27 NOTE — PROGRESS NOTES
[facility] - [address] [place] [address] Medicine  Progress Note    Patient Name: Felicia Keita  MRN: [id number]  Patient Class: IP- Inpatient   Admission Date: 9/25/2023  Length of Stay: 2 days  Attending Physician: Lionel Snyder DO  Primary Care Provider: Alfonso Wagoner DO        Subjective:     Principal Problem:Acute ischemic stroke        HPI:  Ms. 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 Per patient, she was in usual state around 5PM yesterday and then suddenly she noted to have work finding difficulty associated with unsteady gait. Her speech was slurred and she felt right leg heavier with numbness. Her symptoms persisted through the evening but she did not seek medical attention thinking that this will get better. Next day (which is today), she woke up with slightly worsening of her speech and her weakness and unsteady gait persisted so she presented to ED. No reports of head trauma, seizure like activity, fever, chills. She denies any right arm weakness. Her speech has slightly improved since she has been in the ED. She reports a history of mini-stroke a few years ago without residual deficits. She is very functional at baseline, she drives and takes care of her daughter who has history of aneurysm and strokes.       Overview/Hospital Course:  No notes on file    Interval History: Patient was seen resting in bed comfortably. She reports feeling better. Speech is improved but weakness in the right leg still exist. Pending insurance approval to swing bed at [place].     Review of Systems   Respiratory:  Negative for shortness of breath, wheezing and stridor.    Cardiovascular:  Negative for chest pain, palpitations and leg swelling.   Gastrointestinal:  Negative for abdominal distention, abdominal pain and anal  bleeding.   Allergic/Immunologic: Negative for environmental allergies and food allergies.   Neurological:  Positive for speech difficulty, weakness and numbness.   All other systems reviewed and are negative.    Objective:     Vital Signs (Most Recent):  Temp: 97.5 °F (36.4 °C) (09/27/23 0700)  Pulse: 70 (09/27/23 0700)  Resp: 18 (09/27/23 0940)  BP: (!) 174/73 (reported to nurse Jhony) (09/27/23 0700)  SpO2: (!) 94 % (09/27/23 0700) Vital Signs (24h Range):  Temp:  [97.5 °F (36.4 °C)-98 °F (36.7 °C)] 97.5 °F (36.4 °C)  Pulse:  [60-74] 70  Resp:  [17-18] 18  SpO2:  [93 %-97 %] 94 %  BP: (131-174)/() 174/73     Weight: 93.4 kg (205 lb 14.6 oz)  Body mass index is 32.25 kg/m².    Intake/Output Summary (Last 24 hours) at 9/27/2023 1130  Last data filed at 9/27/2023 0150  Gross per 24 hour   Intake --   Output 1100 ml   Net -1100 ml         Physical Exam  Vitals and nursing note reviewed.   Constitutional:       General: She is awake. She is not in acute distress.     Appearance: Normal appearance. She is well-developed and well-groomed. She is obese. She is not ill-appearing or toxic-appearing.   HENT:      Head: Normocephalic and atraumatic.      Mouth/Throat:      Mouth: Mucous membranes are moist.   Eyes:      Extraocular Movements: Extraocular movements intact.      Pupils: Pupils are equal, round, and reactive to light.   Cardiovascular:      Rate and Rhythm: Normal rate and regular rhythm.      Pulses: Normal pulses.      Heart sounds: Normal heart sounds.   Pulmonary:      Effort: Pulmonary effort is normal.      Breath sounds: Normal breath sounds.   Abdominal:      General: Bowel sounds are normal.      Palpations: Abdomen is soft.   Musculoskeletal:         General: Normal range of motion.      Cervical back: Normal range of motion and neck supple.   Skin:     General: Skin is warm and dry.   Neurological:      Mental Status: She is alert.      Cranial Nerves: Facial asymmetry present.      Motor:  Weakness and tremor present.      Coordination: Coordination normal. Finger-Nose-Finger Test and Heel to Shin Test normal. Rapid alternating movements normal.      Gait: Gait abnormal.   Psychiatric:         Mood and Affect: Mood normal.         Behavior: Behavior normal. Behavior is cooperative.             Significant Labs: All pertinent labs within the past 24 hours have been reviewed.    Significant Imaging: I have reviewed all pertinent imaging results/findings within the past 24 hours.      Assessment/Plan:      * Acute ischemic stroke  Presented with 1 day onset of aphasia, right lower extremity weakness and unsteady gait.   CT head showed an acute area of infarct in the posterior left frontal lobe.   CTA head & neck showed no intracranial large vessel occlusion, no intracranial aneurysm or significant stenosis. Atherosclerotic disease seen along the aortic arch and in the carotid bifurcations without significant stenosis.   Tele-stroke neurologist consulted in ED, recommend admission for MRI brain without contrast, echo with bubble study- ordered.     MRI of brain: Acute to subacute area of infarct of the left posterior insular cortex and left posterior frontal lobe as described. Mild chronic microvascular ischemic change. No mass effect, midline shift, hemorrhage, or herniation.    PT OT on board    Antithrombotics for secondary stroke prevention: Antiplatelets: Aspirin: 81 mg daily  Anticoagulants: Rivaroxaban 20 mg daily    Statins for secondary stroke prevention and hyperlipidemia, if present:   Statins: Atorvastatin- 80 mg daily (increased from home dose of 40 mg, ordered lipid panel).     Aggressive risk factor modification: HTN, HLD     Rehab efforts: The patient has been evaluated by a stroke team provider and the therapy needs have been fully considered based off the presenting complaints and exam findings. The following therapy evaluations are needed: PT evaluate and treat, OT evaluate and treat,  SLP evaluate and treat    Diagnostics ordered/pending: Carotid ultrasound to assess vasculature, CT scan of head without contrast to asses brain parenchyma, CTA Head to assess vasculature , CTA Neck/Arch to assess vasculature, HgbA1C to assess blood glucose levels, Lipid Profile to assess cholesterol levels, MRI head without contrast to assess brain parenchyma, TTE to assess cardiac function/status , TSH to assess thyroid function    VTE prophylaxis: Mechanical prophylaxis: Place SCDs    BP parameters: Infarct: No intervention, SBP <220        Hyperlipidemia  Resume home statin- dose increased given CVA.       Recurrent deep vein thrombosis (DVT)  Resume home Xarelto.       Uncontrolled hypertension  BP elevated on admission.  Allow permissive HTN for 24 hours.   Resume home amlodipine/losartan once out of window.       Chronic pain syndrome   reviewed: Resume home Norco, tizanidine and gabapentin.       Depression  Patient has recurrent depression which is moderate and is currently controlled. Will Continue anti-depressant medications. We will not consult psychiatry at this time. Patient does not display psychosis at this time. Continue to monitor closely and adjust plan of care as needed.        Restless legs  Resume home pramipexole.       GERD without esophagitis  Resume home oral PPI.       Nodule of parotid gland  Incidental finding.   CTA head/neck showed nodule in the right superficial parotid gland measuring 1.4 cm favors a primary parotid neoplasm.\  Recommend ENT evaluation as outpatient.       Essential tremor  Resume home propranolol and primidone.       Chronic obstructive pulmonary disease  Stable without exacerbation.  PRN albuterol inhaler.         VTE Risk Mitigation (From admission, onward)         Ordered     rivaroxaban tablet 20 mg  Daily         09/25/23 4067     Reason for No Pharmacological VTE Prophylaxis  Once        Question:  Reasons:  Answer:  Already adequately anticoagulated on  oral Anticoagulants    09/25/23 1329     IP VTE HIGH RISK PATIENT  Once         09/25/23 1329     Place sequential compression device  Until discontinued         09/25/23 1329                Discharge Planning   GINO:      Code Status: Full Code   Is the patient medically ready for discharge?:     Reason for patient still in hospital (select all that apply): Patient trending condition, Laboratory test, Treatment and Pending disposition  Discharge Plan A: Home with family                  Papito Gardner MD  Department of [facility] Medicine   [facility] - [facility] Telemetry

## 2023-09-27 NOTE — PT/OT/SLP PROGRESS
Physical Therapy Treatment    Patient Name:  Felicia Keita   MRN:  [id number]    Recommendations:     Discharge Recommendations: outpatient PT  Discharge Equipment Recommendations: none  Barriers to discharge: Decreased caregiver support    Assessment:     Felicia Keita is a 78 y.o. female admitted with a medical diagnosis of Acute ischemic stroke.  She presents with the following impairments/functional limitations: impaired balance, gait instability, decreased ROM, pain Patient's balance improving. Able to use rolling walker and walk with supervision today. Plan is for swingbed at [place]. .    Rehab Prognosis: Good; patient would benefit from acute skilled PT services to address these deficits and reach maximum level of function.    Recent Surgery: * No surgery found *      Plan:     During this hospitalization, patient to be seen 5 x/week to address the identified rehab impairments via gait training, therapeutic activities, therapeutic exercises and progress toward the following goals:    Plan of Care Expires:  10/26/23    Subjective     Chief Complaint: balance  Patient/Family Comments/goals: Patient states she is getting better every day  Pain/Comfort:  Pain Rating 1: 0/10      Objective:     Communicated with nurse prior to session.  Patient found up in chair with   upon PT entry to room.     General Precautions: Standard, fall  Orthopedic Precautions:    Braces:    Respiratory Status: Room air     Functional Mobility:  Bed Mobility:     Supine to Sit: contact guard assistance  Sit to Supine: contact guard assistance  Transfers:     Sit to Stand:  contact guard assistance with rolling walker  Gait: ambulated 100 feet with rolling walker cga      AM-PAC 6 CLICK MOBILITY  Turning over in bed (including adjusting bedclothes, sheets and blankets)?: 3  Sitting down on and standing up from a chair with arms (e.g., wheelchair, bedside commode, etc.): 3  Moving from lying on back to sitting on the side of the bed?: 3  Moving  to and from a bed to a chair (including a wheelchair)?: 3  Need to walk in hospital room?: 3  Climbing 3-5 steps with a railing?: 3  Basic Mobility Total Score: 18       Treatment & Education:  BLE: aps, hs, saq, mre flexion ext 3x10    Patient left supine with call button in reach..    GOALS:   Multidisciplinary Problems       Physical Therapy Goals          Problem: Physical Therapy    Goal Priority Disciplines Outcome Goal Variances Interventions   Physical Therapy Goal     PT, PT/OT Ongoing, Progressing     Description: Short Term Goals  Ambulate SBA - 100 feet with rolling walker assistive device.   Supine to sit contact guard  Sit to stand contact guard  SPT contact guard  5. Independent with HEP    Long Term Goals  Ambulate SBA - 300 feet with none assistive device.   Supine to sit independent without device  Sit to stand independent without device  SPT independent without device  Needed equipment for home.                              Time Tracking:     PT Received On: 09/27/23  PT Start Time: 1015     PT Stop Time: 1040  PT Total Time (min): 25 min     Billable Minutes: Gait Training 10 and Therapeutic Exercise 15    Treatment Type: Treatment  PT/PTA: PT     Number of PTA visits since last PT visit: 0     09/27/2023

## 2023-09-27 NOTE — PT/OT/SLP PROGRESS
Occupational Therapy   Treatment    Name: Felicia Keita  MRN: [id number]  Admitting Diagnosis:  Acute ischemic stroke       Recommendations:     Discharge Recommendations: home health PT, outpatient PT  Discharge Equipment Recommendations:  none  Barriers to discharge:  None    Assessment:     Felicia Keita is a 78 y.o. female with a medical diagnosis of Acute ischemic stroke.  She presents with alert and agreeable to treatment. Performance deficits affecting function are impaired functional mobility, gait instability, decreased safety awareness.     Rehab Prognosis:  Good; patient would benefit from acute skilled OT services to address these deficits and reach maximum level of function.       Plan:     Patient to be seen 5 x/week to address the above listed problems via self-care/home management, therapeutic activities, therapeutic exercises  Plan of Care Expires: 10/10/23  Plan of Care Reviewed with: patient    Subjective     Chief Complaint: Pt was admitted with acute ischemic CVA  Patient/Family Comments/goals: Pt wants to return home  Pain/Comfort:  Pain Rating 1: 0/10  Pain Rating Post-Intervention 1: 0/10    Objective:     Communicated with: CLARI Burroughs prior to session.  Patient found supine with PureWick, peripheral IV upon OT entry to room.    General Precautions: Standard, fall    Orthopedic Precautions:N/A  Braces: N/A  Respiratory Status: Room air     Occupational Performance:     Bed Mobility:    Patient completed Supine to Sit with modified independence     Functional Mobility/Transfers:  Patient completed Sit <> Stand Transfer with stand by assistance  with  rolling walker   Patient completed Bed <> Chair Transfer using Step Transfer technique with stand by assistance with rolling walker  Functional Mobility: Pt ambulated to bathroom and stood at the mirror to comb her hair    Activities of Daily Living:  Grooming: stand by assistance to comb hair at the sink      [facility] 6 Click ADL: 21    Treatment  & Education:  Pt performed standing activity with easy  pegs using each UE to place and to remove pegs. Pt demonstrated improved balance and improved coordination and function of each hand today.    Pt ambulated to snack machines and back with RW and SBA to increase her activity tolerance.    Patient left up in chair with call button in reach and chair alarm on    GOALS:   Multidisciplinary Problems       Occupational Therapy Goals          Problem: Occupational Therapy    Goal Priority Disciplines Outcome Interventions   Occupational Therapy Goal     OT, PT/OT Ongoing, Progressing    Description: STG: (in 1 week pt will)  Pt will perform grooming with setup  Pt will bathe with setup and SBA  Pt will perform UE dressing with with setup  Pt will perform LE dressing with with SBA  Pt will perform toileting with supervision/ SBA  Pt will transfer bed/chair/bsc with RW and SBA  Pt will perform standing task x 5 min with SBA with RW  Pt will tolerate 20 minutes of tx without fatigue      LTG: (in 3 weeks)  1.Restore to max I with self care and mobility.                          Time Tracking:     OT Date of Treatment: 09/27/23  OT Start Time: 0957  OT Stop Time: 1032  OT Total Time (min): 35 min    Billable Minutes:Therapeutic Activity 30 min    OT/SRINIVAS: OT          9/27/2023

## 2023-09-27 NOTE — ASSESSMENT & PLAN NOTE
Presented with 1 day onset of aphasia, right lower extremity weakness and unsteady gait.   CT head showed an acute area of infarct in the posterior left frontal lobe.   CTA head & neck showed no intracranial large vessel occlusion, no intracranial aneurysm or significant stenosis. Atherosclerotic disease seen along the aortic arch and in the carotid bifurcations without significant stenosis.   Tele-stroke neurologist consulted in ED, recommend admission for MRI brain without contrast, echo with bubble study- ordered.     MRI of brain: Acute to subacute area of infarct of the left posterior insular cortex and left posterior frontal lobe as described. Mild chronic microvascular ischemic change. No mass effect, midline shift, hemorrhage, or herniation.    PT OT on board    Antithrombotics for secondary stroke prevention: Antiplatelets: Aspirin: 81 mg daily  Anticoagulants: Rivaroxaban 20 mg daily    Statins for secondary stroke prevention and hyperlipidemia, if present:   Statins: Atorvastatin- 80 mg daily (increased from home dose of 40 mg, ordered lipid panel).     Aggressive risk factor modification: HTN, HLD     Rehab efforts: The patient has been evaluated by a stroke team provider and the therapy needs have been fully considered based off the presenting complaints and exam findings. The following therapy evaluations are needed: PT evaluate and treat, OT evaluate and treat, SLP evaluate and treat    Diagnostics ordered/pending: Carotid ultrasound to assess vasculature, CT scan of head without contrast to asses brain parenchyma, CTA Head to assess vasculature , CTA Neck/Arch to assess vasculature, HgbA1C to assess blood glucose levels, Lipid Profile to assess cholesterol levels, MRI head without contrast to assess brain parenchyma, TTE to assess cardiac function/status , TSH to assess thyroid function    VTE prophylaxis: Mechanical prophylaxis: Place SCDs    BP parameters: Infarct: No intervention, SBP  <220      Follow up with PCP

## 2023-09-27 NOTE — ASSESSMENT & PLAN NOTE
Strong suspicion for CVA based on history and physical.  Patient exhibits slurred speech, unilateral weakness an abnormal finger-to-nose test.  Patient and family advised at length that she should present to the local emergency department for further evaluation and management.  Patient and family advised that she is likely out of the window for tPA.  Patient and family also advised at length to present to the ED should these symptoms occur again in the future.  Patient and family express preference to be seen at [facility].

## 2023-09-27 NOTE — SUBJECTIVE & OBJECTIVE
Interval History: Patient was seen resting in bed comfortably. She reports feeling better. Speech is improved but weakness in the right leg still exist. Pending insurance approval to swing bed at [place].     Review of Systems   Respiratory:  Negative for shortness of breath, wheezing and stridor.    Cardiovascular:  Negative for chest pain, palpitations and leg swelling.   Gastrointestinal:  Negative for abdominal distention, abdominal pain and anal bleeding.   Allergic/Immunologic: Negative for environmental allergies and food allergies.   Neurological:  Positive for speech difficulty, weakness and numbness.   All other systems reviewed and are negative.    Objective:     Vital Signs (Most Recent):  Temp: 97.5 °F (36.4 °C) (09/27/23 0700)  Pulse: 70 (09/27/23 0700)  Resp: 18 (09/27/23 0940)  BP: (!) 174/73 (reported to nurse Jhony) (09/27/23 0700)  SpO2: (!) 94 % (09/27/23 0700) Vital Signs (24h Range):  Temp:  [97.5 °F (36.4 °C)-98 °F (36.7 °C)] 97.5 °F (36.4 °C)  Pulse:  [60-74] 70  Resp:  [17-18] 18  SpO2:  [93 %-97 %] 94 %  BP: (131-174)/() 174/73     Weight: 93.4 kg (205 lb 14.6 oz)  Body mass index is 32.25 kg/m².    Intake/Output Summary (Last 24 hours) at 9/27/2023 1130  Last data filed at 9/27/2023 0150  Gross per 24 hour   Intake --   Output 1100 ml   Net -1100 ml         Physical Exam  Vitals and nursing note reviewed.   Constitutional:       General: She is awake. She is not in acute distress.     Appearance: Normal appearance. She is well-developed and well-groomed. She is obese. She is not ill-appearing or toxic-appearing.   HENT:      Head: Normocephalic and atraumatic.      Mouth/Throat:      Mouth: Mucous membranes are moist.   Eyes:      Extraocular Movements: Extraocular movements intact.      Pupils: Pupils are equal, round, and reactive to light.   Cardiovascular:      Rate and Rhythm: Normal rate and regular rhythm.      Pulses: Normal pulses.      Heart sounds: Normal heart sounds.    Pulmonary:      Effort: Pulmonary effort is normal.      Breath sounds: Normal breath sounds.   Abdominal:      General: Bowel sounds are normal.      Palpations: Abdomen is soft.   Musculoskeletal:         General: Normal range of motion.      Cervical back: Normal range of motion and neck supple.   Skin:     General: Skin is warm and dry.   Neurological:      Mental Status: She is alert.      Cranial Nerves: Facial asymmetry present.      Motor: Weakness and tremor present.      Coordination: Coordination normal. Finger-Nose-Finger Test and Heel to Shin Test normal. Rapid alternating movements normal.      Gait: Gait abnormal.   Psychiatric:         Mood and Affect: Mood normal.         Behavior: Behavior normal. Behavior is cooperative.             Significant Labs: All pertinent labs within the past 24 hours have been reviewed.    Significant Imaging: I have reviewed all pertinent imaging results/findings within the past 24 hours.

## 2023-09-27 NOTE — ASSESSMENT & PLAN NOTE
BP elevated on admission.  Allow permissive HTN for 24 hours.   Resume home amlodipine/losartan once out of window.     Follow up with PCP

## 2023-09-27 NOTE — ASSESSMENT & PLAN NOTE
Patient has recurrent depression which is moderate and is currently controlled. Will Continue anti-depressant medications. We will not consult psychiatry at this time. Patient does not display psychosis at this time. Continue to monitor closely and adjust plan of care as needed.    Follow up with PCP

## 2023-09-27 NOTE — PLAN OF CARE
[facility] [facility]  Discharge Final Note    Primary Care Provider: Alfonso Wagoner DO    Expected Discharge Date: 9/27/2023    Final Discharge Note (most recent)       Final Note - 09/27/23 1404          Final Note    Assessment Type Final Discharge Note     Anticipated Discharge Disposition Home-Health Care Svc        Post-Acute Status    Post-Acute Authorization Home Health     Home Health Status Set-up Complete/Auth obtained     Patient choice form signed by patient/caregiver List with quality metrics by geographic area provided;List from CMS Compare;List from System Post-Acute Care     Discharge Delays None known at this time                   Patient discharging today. Patient was accepted to [facility] and her insurance approved. However, patient now refusing swingbed and wants to go home with home health.  notified Dr. Gardner. Consult received for home health. Choice obtained for [facility]. Referral and discharge information faxed to [facility] at this time. No further discharge needs noted.     Important Message from Medicare  Important Message from Medicare regarding Discharge Appeal Rights: Given to patient/caregiver, Explained to patient/caregiver, Signed/date by patient/caregiver     Date IMM was signed: 09/26/23  Time IMM was signed: 1049     Follow-up providers       Alfonso Wagoner DO   Specialty: Family Medicine   Relationship: PCP - General    [address]  [place] [address]   Phone: [phone]       Next Steps: Follow up in 2 week(s)    Instructions: After hospital follow up              After-discharge care                Home Medical Care       [facility]   Service: Home Health Services    [address] [place] [id number]   Phone: [phone]

## 2023-09-27 NOTE — DISCHARGE SUMMARY
[facility] - [address] [place] [address] Medicine  Discharge Summary      Patient Name: Felicia Keita  MRN: [id number]  LAURITA: [id number]  Patient Class: IP- Inpatient  Admission Date: 9/25/2023  Hospital Length of Stay: 2 days  Discharge Date and Time:  09/27/2023 1:46 PM  Attending Physician: Lionel Snyder DO   Discharging Provider: Papito Gardner MD  Primary Care Provider: Alfonso Wagoner DO    Primary Care Team: Networked reference to record PCT     HPI:   Ms. 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 Per patient, she was in usual state around 5PM yesterday and then suddenly she noted to have work finding difficulty associated with unsteady gait. Her speech was slurred and she felt right leg heavier with numbness. Her symptoms persisted through the evening but she did not seek medical attention thinking that this will get better. Next day (which is today), she woke up with slightly worsening of her speech and her weakness and unsteady gait persisted so she presented to ED. No reports of head trauma, seizure like activity, fever, chills. She denies any right arm weakness. Her speech has slightly improved since she has been in the ED. She reports a history of mini-stroke a few years ago without residual deficits. She is very functional at baseline, she drives and takes care of her daughter who has history of aneurysm and strokes.       * No surgery found *      Hospital Course:   Patient is a 78 year old female that presented to [facility] with difficulty speaking, right leg weakness and unstable gait. She was evaluated with CTA Head - No intracranial large vessel occlusion.  No intracranial aneurysm or significant stenosis. Atherosclerotic disease seen along the aortic arch and in the carotid bifurcations without  significant stenosis. Emphysema. Nodule in the right superficial parotid gland measuring 1.4 cm favors a primary parotid neoplasm.  Consider follow-up with otolaryngology evaluation. Also CT Head w/o contrast - Acute area of infarct in the posterior left frontal lobe.  No hemorrhage.  No midline shift or herniation.  PT/OT recommended swing bed placement. The patient was informed at refused. She has home health setup and strongly insists on going home.  The patient has receieved maximum benefit of hospitalization and will be discharged home.        Goals of Care Treatment Preferences:  Code Status: Full Code      Consults:   Consults (From admission, onward)        Status Ordering Provider     IP consult to case management/social work  Once        Provider:  (Not yet assigned)    Completed JOHN GREGORIO     Consult to Telemedicine - Acute Stroke  Once        Provider:  Kaylah Be MD    Completed BARB ZAMAN.          Neuro  * Acute ischemic stroke  Presented with 1 day onset of aphasia, right lower extremity weakness and unsteady gait.   CT head showed an acute area of infarct in the posterior left frontal lobe.   CTA head & neck showed no intracranial large vessel occlusion, no intracranial aneurysm or significant stenosis. Atherosclerotic disease seen along the aortic arch and in the carotid bifurcations without significant stenosis.   Tele-stroke neurologist consulted in ED, recommend admission for MRI brain without contrast, echo with bubble study- ordered.     MRI of brain: Acute to subacute area of infarct of the left posterior insular cortex and left posterior frontal lobe as described. Mild chronic microvascular ischemic change. No mass effect, midline shift, hemorrhage, or herniation.    PT OT on board    Antithrombotics for secondary stroke prevention: Antiplatelets: Aspirin: 81 mg daily  Anticoagulants: Rivaroxaban 20 mg daily    Statins for secondary stroke prevention and hyperlipidemia,  if present:   Statins: Atorvastatin- 80 mg daily (increased from home dose of 40 mg, ordered lipid panel).     Aggressive risk factor modification: HTN, HLD     Rehab efforts: The patient has been evaluated by a stroke team provider and the therapy needs have been fully considered based off the presenting complaints and exam findings. The following therapy evaluations are needed: PT evaluate and treat, OT evaluate and treat, SLP evaluate and treat    Diagnostics ordered/pending: Carotid ultrasound to assess vasculature, CT scan of head without contrast to asses brain parenchyma, CTA Head to assess vasculature , CTA Neck/Arch to assess vasculature, HgbA1C to assess blood glucose levels, Lipid Profile to assess cholesterol levels, MRI head without contrast to assess brain parenchyma, TTE to assess cardiac function/status , TSH to assess thyroid function    VTE prophylaxis: Mechanical prophylaxis: Place SCDs    BP parameters: Infarct: No intervention, SBP <220      Follow up with PCP      Essential tremor  Resume home propranolol and primidone.     Follow up with PCP    Restless legs  Resume home pramipexole.     Follow up with PCP    Chronic pain syndrome   reviewed: Resume home Norco, tizanidine and gabapentin.     Follow up with PCP    Psychiatric  Depression  Patient has recurrent depression which is moderate and is currently controlled. Will Continue anti-depressant medications. We will not consult psychiatry at this time. Patient does not display psychosis at this time. Continue to monitor closely and adjust plan of care as needed.    Follow up with PCP    ENT  Nodule of parotid gland  Incidental finding.   CTA head/neck showed nodule in the right superficial parotid gland measuring 1.4 cm favors a primary parotid neoplasm.\  Recommend ENT evaluation as outpatient.     Follow up with PCP    Pulmonary  Chronic obstructive pulmonary disease  Stable without exacerbation.  PRN albuterol inhaler.     Follow up with  PCP    Cardiac/Vascular  Uncontrolled hypertension  BP elevated on admission.  Allow permissive HTN for 24 hours.   Resume home amlodipine/losartan once out of window.     Follow up with PCP    Hyperlipidemia  Resume home statin- dose increased given CVA.     Follow up with PCP    Hematology  Recurrent deep vein thrombosis (DVT)  Resume home Xarelto.     Follow up with PCP    GI  GERD without esophagitis  Resume home oral PPI.     Follow up with PCP      Final Active Diagnoses:    Diagnosis Date Noted POA    PRINCIPAL PROBLEM:  Acute ischemic stroke [I63.9] 09/25/2023 Yes    Hyperlipidemia [E78.5] 05/08/2012 Yes    Recurrent deep vein thrombosis (DVT) [I82.409] 09/25/2023 Yes    Uncontrolled hypertension [I10] 08/12/2020 Yes    Chronic pain syndrome [G89.4] 09/25/2023 Yes    Depression [F32.A] 07/25/2011 Yes    Restless legs [G25.81] 05/22/2018 Yes    GERD without esophagitis [K21.9] 08/15/2017 Yes    Essential tremor [G25.0] 09/25/2023 Yes    Nodule of parotid gland [K11.8] 09/25/2023 Yes    Chronic obstructive pulmonary disease [J44.9] 07/24/2022 Yes      Problems Resolved During this Admission:       Discharged Condition: stable    Disposition: Rehab Facility    Follow Up:   Contact information for follow-up providers     Alfonso Wagoner, DO Follow up in 2 week(s).    Specialty: Family Medicine  Why: After hospital follow up  Contact information:  [address] [address]  [facility] [address]  [phone]                   Contact information for after-discharge care     Destination     [facility] .    Service: Skilled Nursing  Contact information:  [address]  [place] [address]  [phone]                           Patient Instructions:      Notify your health care provider if you experience any of the following:  difficulty breathing or increased cough     Notify your health care provider if you experience any of the following:  redness, tenderness, or signs of infection (pain, swelling, redness, odor or  green/yellow discharge around incision site)     Notify your health care provider if you experience any of the following:  severe uncontrolled pain     Notify your health care provider if you experience any of the following:  persistent nausea and vomiting or diarrhea     Notify your health care provider if you experience any of the following:  persistent dizziness, light-headedness, or visual disturbances     Notify your health care provider if you experience any of the following:  increased confusion or weakness     Activity as tolerated       Significant Diagnostic Studies: Labs: All labs within the past 24 hours have been reviewed  Radiology: X-Ray: CXR: X-Ray Chest 1 View (CXR): No results found for this visit on 09/25/23.  CT scan: CT ABDOMEN PELVIS WITH CONTRAST: No results found for this visit on 09/25/23. and CT ABDOMEN PELVIS WITHOUT CONTRAST: No results found for this visit on 09/25/23.    Pending Diagnostic Studies:     None         Medications:  Reconciled Home Medications:      Medication List      START taking these medications    aspirin 81 MG EC tablet  Commonly known as: ECOTRIN  Take 1 tablet (81 mg total) by mouth once daily.  Start taking on: September 28, 2023        CHANGE how you take these medications    atorvastatin 80 MG tablet  Commonly known as: LIPITOR  Take 1 tablet (80 mg total) by mouth once daily.  Start taking on: September 28, 2023  What changed:   · medication strength  · how much to take        CONTINUE taking these medications    albuterol 90 mcg/actuation inhaler  Commonly known as: PROVENTIL/VENTOLIN HFA  Inhale 1-2 puffs into the lungs every 4 (four) hours as needed for Wheezing. Rescue     albuterol-ipratropium 2.5 mg-0.5 mg/3 mL nebulizer solution  Commonly known as: DUO-NEB  Take 3 mLs by nebulization every 6 (six) hours as needed for Wheezing. Rescue     amLODIPine 5 MG tablet  Commonly known as: NORVASC  Take 1 tablet (5 mg total) by mouth 2 (two) times daily.      ascorbic acid (vitamin C) 500 MG tablet  Commonly known as: VITAMIN C  Take 500 mg by mouth once daily.     calcium-vitamin D3 600 mg-5 mcg (200 unit) Tab  Commonly known as: CALCIUM 600 + D(3)  Take 1 tablet by mouth 2 (two) times a day.     desvenlafaxine succinate 100 MG Tb24  Commonly known as: PRISTIQ  Take 1 tablet (100 mg total) by mouth once daily.     diaper,brief,adult,disposable Misc  1 each by Misc.(Non-Drug; Combo Route) route continuous prn (prn).     diclofenac sodium 1 % Gel  Commonly known as: VOLTAREN  Apply 4 grams to large joints 4 times daily     furosemide 20 MG tablet  Commonly known as: LASIX  Take 1 tablet (20 mg total) by mouth once daily.     gabapentin 300 MG capsule  Commonly known as: NEURONTIN  TAKE 1 CAPSULE BY MOUTH IN THE MORNING, TAKE 1 CAPSULE at LUNCH, AND TAKE 2 CAPSULES AT BEDTIME     HYDROcodone-acetaminophen  mg per tablet  Commonly known as: NORCO  05/27/21 TAKE 1 TABLET BY MOUTH EVERY 8 HOURS AS NEEDED     ipratropium 42 mcg (0.06 %) nasal spray  Commonly known as: ATROVENT  2 sprays by Each Nostril route 4 (four) times daily.     losartan 100 MG tablet  Commonly known as: COZAAR  Take 1 tablet (100 mg total) by mouth once daily.     omeprazole 20 MG capsule  Commonly known as: PRILOSEC  Take 1 capsule (20 mg total) by mouth once daily.     pramipexole 0.125 MG tablet  Commonly known as: MIRAPEX  Take 2 tablets (0.25 mg total) by mouth nightly.     primidone 50 MG Tab  Commonly known as: MYSOLINE  TAKE 2 TABLETS BY MOUTH EVERY MORNING AND TAKE 3 TABLETS BY MOUTH EVERY EVENING     propranoloL 40 MG tablet  Commonly known as: INDERAL  Take 1 tablet (40 mg total) by mouth 2 (two) times daily.     pyrilam-chlophed-acetaminophen 12.5-12.5-160 mg/5 mL Liqd  Take 10 mLs by mouth every 8 (eight) hours as needed (COUGH).     rivaroxaban 20 mg Tab  Commonly known as: XARELTO  Take 1 tablet (20 mg total) by mouth once daily.     senna-docusate 8.6-50 mg 8.6-50 mg per  tablet  Commonly known as: PERICOLACE  Take 1 tablet by mouth once daily.     tiZANidine 4 MG tablet  Commonly known as: ZANAFLEX  Take 1 tablet (4 mg total) by mouth 2 (two) times daily as needed (muscle spasms).     zinc gluconate 50 mg tablet  Take 50 mg by mouth once daily.        STOP taking these medications    PROLIA 60 mg/mL Syrg  Generic drug: denosumab        ASK your doctor about these medications    cetirizine 10 MG tablet  Commonly known as: ZYRTEC  Take 1 tablet (10 mg total) by mouth once daily.            Indwelling Lines/Drains at time of discharge:   Lines/Drains/Airways     None                 Time spent on the discharge of patient: 40 minutes         Papito Gardner MD  Department of Hospital Medicine  [facility] - [facility]

## 2023-09-27 NOTE — PLAN OF CARE
Problem: Adult Inpatient Plan of Care  Goal: Plan of Care Review  Outcome: Ongoing, Progressing  Flowsheets (Taken 9/27/2023 1141)  Plan of Care Reviewed With: patient  Goal: Patient-Specific Goal (Individualized)  Outcome: Ongoing, Progressing  Flowsheets (Taken 9/27/2023 1141)  Anxieties, Fears or Concerns: none  Individualized Care Needs: improve strength  Goal: Absence of Hospital-Acquired Illness or Injury  Outcome: Ongoing, Progressing  Intervention: Identify and Manage Fall Risk  Flowsheets (Taken 9/27/2023 1141)  Safety Promotion/Fall Prevention: assistive device/personal item within reach  Intervention: Prevent Skin Injury  Flowsheets (Taken 9/27/2023 1141)  Body Position: position changed independently  Intervention: Prevent and Manage VTE (Venous Thromboembolism) Risk  Flowsheets (Taken 9/27/2023 1141)  Activity Management: Walk with assistive devise and /or staff member - L3  Goal: Optimal Comfort and Wellbeing  Outcome: Ongoing, Progressing  Goal: Readiness for Transition of Care  Outcome: Ongoing, Progressing     Problem: Infection  Goal: Absence of Infection Signs and Symptoms  Outcome: Ongoing, Progressing     Problem: Adjustment to Illness (Stroke, Ischemic/Transient Ischemic Attack)  Goal: Optimal Coping  Outcome: Ongoing, Progressing     Problem: Bowel Elimination Impaired (Stroke, Ischemic/Transient Ischemic Attack)  Goal: Effective Bowel Elimination  Outcome: Ongoing, Progressing     Problem: Cerebral Tissue Perfusion (Stroke, Ischemic/Transient Ischemic Attack)  Goal: Optimal Cerebral Tissue Perfusion  Outcome: Ongoing, Progressing     Problem: Cognitive Impairment (Stroke, Ischemic/Transient Ischemic Attack)  Goal: Optimal Cognitive Function  Outcome: Ongoing, Progressing     Problem: Communication Impairment (Stroke, Ischemic/Transient Ischemic Attack)  Goal: Improved Communication Skills  Outcome: Ongoing, Progressing     Problem: Functional Ability Impaired (Stroke, Ischemic/Transient  Ischemic Attack)  Goal: Optimal Functional Ability  Outcome: Ongoing, Progressing     Problem: Respiratory Compromise (Stroke, Ischemic/Transient Ischemic Attack)  Goal: Effective Oxygenation and Ventilation  Outcome: Ongoing, Progressing     Problem: Sensorimotor Impairment (Stroke, Ischemic/Transient Ischemic Attack)  Goal: Improved Sensorimotor Function  Outcome: Ongoing, Progressing     Problem: Swallowing Impairment (Stroke, Ischemic/Transient Ischemic Attack)  Goal: Optimal Eating and Swallowing without Aspiration  Outcome: Ongoing, Progressing     Problem: Urinary Elimination Impaired (Stroke, Ischemic/Transient Ischemic Attack)  Goal: Effective Urinary Elimination  Outcome: Ongoing, Progressing

## 2023-09-27 NOTE — HOSPITAL COURSE
Patient is a 78 year old female that presented to [facility] with difficulty speaking, right leg weakness and unstable gait. She was evaluated with CTA Head - No intracranial large vessel occlusion.  No intracranial aneurysm or significant stenosis. Atherosclerotic disease seen along the aortic arch and in the carotid bifurcations without significant stenosis. Emphysema. Nodule in the right superficial parotid gland measuring 1.4 cm favors a primary parotid neoplasm.  Consider follow-up with otolaryngology evaluation. Also CT Head w/o contrast - Acute area of infarct in the posterior left frontal lobe.  No hemorrhage.  No midline shift or herniation.  PT/OT recommended swing bed placement. The patient was informed at refused. She has home health setup and strongly insists on going home.  The patient has receieved maximum benefit of hospitalization and will be discharged home.

## 2023-09-28 PROCEDURE — G0180 PR HOME HEALTH MD CERTIFICATION: ICD-10-PCS | Mod: ,,, | Performed by: FAMILY MEDICINE

## 2023-09-28 PROCEDURE — G0180 MD CERTIFICATION HHA PATIENT: HCPCS | Mod: ,,, | Performed by: FAMILY MEDICINE

## 2023-10-05 ENCOUNTER — OFFICE VISIT (OUTPATIENT)
Dept: FAMILY MEDICINE | Facility: CLINIC | Age: 78
End: 2023-10-05
Payer: MEDICARE

## 2023-10-05 VITALS
OXYGEN SATURATION: 97 % | WEIGHT: 191.19 LBS | SYSTOLIC BLOOD PRESSURE: 130 MMHG | HEART RATE: 75 BPM | HEIGHT: 67 IN | TEMPERATURE: 98 F | BODY MASS INDEX: 30.01 KG/M2 | DIASTOLIC BLOOD PRESSURE: 60 MMHG | RESPIRATION RATE: 18 BRPM

## 2023-10-05 DIAGNOSIS — G89.29 CHRONIC LOW BACK PAIN WITH LEFT-SIDED SCIATICA, UNSPECIFIED BACK PAIN LATERALITY: ICD-10-CM

## 2023-10-05 DIAGNOSIS — M54.42 CHRONIC LOW BACK PAIN WITH LEFT-SIDED SCIATICA, UNSPECIFIED BACK PAIN LATERALITY: ICD-10-CM

## 2023-10-05 DIAGNOSIS — I63.9 CEREBROVASCULAR ACCIDENT (CVA), UNSPECIFIED MECHANISM: Primary | ICD-10-CM

## 2023-10-05 DIAGNOSIS — K11.8 MASS OF RIGHT PAROTID GLAND: ICD-10-CM

## 2023-10-05 PROCEDURE — 99214 PR OFFICE/OUTPT VISIT, EST, LEVL IV, 30-39 MIN: ICD-10-PCS | Mod: ,,, | Performed by: FAMILY MEDICINE

## 2023-10-05 PROCEDURE — 1159F MED LIST DOCD IN RCRD: CPT | Mod: ,,, | Performed by: FAMILY MEDICINE

## 2023-10-05 PROCEDURE — 3078F DIAST BP <80 MM HG: CPT | Mod: ,,, | Performed by: FAMILY MEDICINE

## 2023-10-05 PROCEDURE — 1101F PR PT FALLS ASSESS DOC 0-1 FALLS W/OUT INJ PAST YR: ICD-10-PCS | Mod: ,,, | Performed by: FAMILY MEDICINE

## 2023-10-05 PROCEDURE — 3075F PR MOST RECENT SYSTOLIC BLOOD PRESS GE 130-139MM HG: ICD-10-PCS | Mod: ,,, | Performed by: FAMILY MEDICINE

## 2023-10-05 PROCEDURE — 1160F PR REVIEW ALL MEDS BY PRESCRIBER/CLIN PHARMACIST DOCUMENTED: ICD-10-PCS | Mod: ,,, | Performed by: FAMILY MEDICINE

## 2023-10-05 PROCEDURE — 1125F PR PAIN SEVERITY QUANTIFIED, PAIN PRESENT: ICD-10-PCS | Mod: ,,, | Performed by: FAMILY MEDICINE

## 2023-10-05 PROCEDURE — 3288F PR FALLS RISK ASSESSMENT DOCUMENTED: ICD-10-PCS | Mod: ,,, | Performed by: FAMILY MEDICINE

## 2023-10-05 PROCEDURE — 99214 OFFICE O/P EST MOD 30 MIN: CPT | Mod: ,,, | Performed by: FAMILY MEDICINE

## 2023-10-05 PROCEDURE — 3078F PR MOST RECENT DIASTOLIC BLOOD PRESSURE < 80 MM HG: ICD-10-PCS | Mod: ,,, | Performed by: FAMILY MEDICINE

## 2023-10-05 PROCEDURE — 1101F PT FALLS ASSESS-DOCD LE1/YR: CPT | Mod: ,,, | Performed by: FAMILY MEDICINE

## 2023-10-05 PROCEDURE — 1125F AMNT PAIN NOTED PAIN PRSNT: CPT | Mod: ,,, | Performed by: FAMILY MEDICINE

## 2023-10-05 PROCEDURE — 1160F RVW MEDS BY RX/DR IN RCRD: CPT | Mod: ,,, | Performed by: FAMILY MEDICINE

## 2023-10-05 PROCEDURE — 1159F PR MEDICATION LIST DOCUMENTED IN MEDICAL RECORD: ICD-10-PCS | Mod: ,,, | Performed by: FAMILY MEDICINE

## 2023-10-05 PROCEDURE — 3075F SYST BP GE 130 - 139MM HG: CPT | Mod: ,,, | Performed by: FAMILY MEDICINE

## 2023-10-05 PROCEDURE — 3288F FALL RISK ASSESSMENT DOCD: CPT | Mod: ,,, | Performed by: FAMILY MEDICINE

## 2023-10-05 RX ORDER — PREGABALIN 25 MG/1
25 CAPSULE ORAL 2 TIMES DAILY
Qty: 180 CAPSULE | Refills: 0 | Status: SHIPPED | OUTPATIENT
Start: 2023-10-05 | End: 2024-01-11 | Stop reason: SDUPTHER

## 2023-10-05 NOTE — PROGRESS NOTES
Patient received Shingles shot at [facility]. She did state pharmacist advised to wait for influenza shot until around November.

## 2023-10-05 NOTE — PROGRESS NOTES
Alfonso Wagoner DO   [facility]  [address] [address] [address]  [phone]      PATIENT NAME: Felicia Keita  : 1945  DATE: 10/5/23  MRN: [id number]      Billing Provider: Alfonso Wagoner DO  Level of Service:   Patient PCP Information       Provider PCP Type    Alfonso Wagoner DO General            Reason for Visit / Chief Complaint: Hospital Follow Up (Patient here for follow up after acute ischemic attack on 2023.), Extremity Weakness (Patient state stroke affected left arm and left leg. Experiencing weakness and pain to left arm and left leg.), and Back Pain (Patient has chronic back pain.)       Update PCP  Update Chief Complaint         History of Present Illness / Problem Focused Workflow     Felicia Keita presents to the clinic with Hospital Follow Up (Patient here for follow up after acute ischemic attack on 2023.), Extremity Weakness (Patient state stroke affected left arm and left leg. Experiencing weakness and pain to left arm and left leg.), and Back Pain (Patient has chronic back pain.)     HPI as noted above.  Patient is a 78-year-old female presenting for hospital follow-up.  Patient was seen by me on  and was exhibiting symptoms consistent with acute CVA.  Patient was referred to the ED.  She subsequently underwent imaging which included MRI as well as CTA head and neck.  Patient was diagnosed with acute infarction and started on Xarelto, aspirin and high-intensity statin.  Patient was established with home health with physical therapy.  She reports that she has been improving but nevertheless suffers from some left-sided weakness.  Patient denies chest pain, shortness of breath, palpitations, dizziness, lightheadedness, confusion, speech difficulties, visual disturbances or any other associated symptoms.  Of note, patient was diagnosed with TIA x2 in  subsequent to the deaths of 2 of her children.    Pt is a c/o chronic back  pain. Pt is seen at the [facility]. However, she reports that pain persists despite pharmacological and surgical treatment. Pt is requesting a steroid injection.         Review of Systems     Review of Systems   Constitutional:  Negative for chills, diaphoresis and fever.   HENT:  Negative for congestion and sore throat.    Respiratory:  Negative for shortness of breath.    Cardiovascular:  Negative for chest pain and palpitations.   Gastrointestinal:  Negative for abdominal pain, constipation, diarrhea, nausea and vomiting.   Genitourinary:  Negative for dysuria and hematuria.   Musculoskeletal:  Positive for back pain.   Skin:  Negative for rash.   Neurological:  Positive for weakness. Negative for dizziness, light-headedness and headaches.       Medical / Social / Family History     Past Medical History:   Diagnosis Date    Abnormal gait 05/22/2013    Actinic keratosis 08/12/2020    L forearm     Allergic rhinitis 04/07/2020    Blepharophimosis 04/22/2014    senile    Cervical somatic dysfunction 08/15/2017    Cervicalgia 03/28/2019    Chronic peripheral venous hypertension 06/16/2015    Chronic serous otitis media, bilateral 09/05/2019    Chronic venous hypertension (idiopathic) without complications of unspecified lower extremity 08/15/2017    Conjunctival hemorrhage, right eye 11/02/2020    Deep venous thrombosis of lower extremity 09/17/2012    Degeneration of cervical intervertebral disc 10/21/2011    Degeneration of lumbar intervertebral disc 10/21/2011    Degenerative joint disease involving multiple joints 07/25/2011    Depressive disorder 07/25/2011    Essential hypertension 08/12/2020    Essential tremor 06/24/2014    GERD without esophagitis 08/15/2017    Headache 04/07/2020    Hyperlipidemia 05/08/2012    Insomnia 04/06/2016    Osteoarthritis 01/04/2017    Osteoporosis 11/07/2017    Otalgia, right ear 10/17/2016    Other cervical disc degeneration, unspecified cervical region 08/15/2017     Overflow incontinence 01/06/2020    Pain in right knee 01/06/2020    osteoarthritis    Pain in right leg 09/10/2020    Peripheral arterial occlusive disease 02/24/2015    Peripheral vascular disease, unspecified 08/06/2019    Peripheral venous insufficiency 09/29/2015    Personal history of PE (pulmonary embolism) 08/06/2019    Presence of vena cava filter 03/2015    Pulmonary embolus 03/10/2015    Pulmonary infarction 03/16/2012    Pure hypercholesterolemia 07/25/2011    Restless legs 05/22/2018    Right temporomandibular joint disorder, unspecified 08/18/2020    Rosacea 07/01/2015    Sciatica, right side 07/06/2016    Seborrheic keratosis 01/07/2021    Segmental and somatic dysfunction of thoracic region 03/28/2019    Senile osteoporosis 04/24/2017    Spasm of back muscles 07/25/2011    Stroke 09/25/2023    Trochanteric bursitis of right hip 01/06/2020    Unspecified urinary incontinence 02/07/2020    Vitamin D deficiency 10/27/2014       Past Surgical History:   Procedure Laterality Date    APPENDECTOMY      bone density  06/27/2019    Ajith/Abiel    BREAST SURGERY Left 2013    approximate    CARPAL TUNNEL RELEASE      BLI    LUMBAR LAMINECTOMY      prolia  03/12/2019    1mg    remove cataract Right     insert lens    TONSILLECTOMY      TOTAL ABDOMINAL HYSTERECTOMY      VAGINAL DELIVERY      vascular surgery procedure       Right SSV Laser Ablation performed by Dr. Carlo hernandez screen  05/24/2018       Social History  Ms. Keita  reports that she has never smoked. She has never been exposed to tobacco smoke. She has never used smokeless tobacco. She reports that she does not drink alcohol and does not use drugs.    Family History  Ms.'s Keita family history includes COPD in her daughter and sister; Cancer in her brother; Emphysema in her father; Hearing loss in her father; Hypertension in her father and sister; Melanoma in her brother; Rheum arthritis in her daughter and sister; Skin cancer in her father;  Stomach cancer in her sister.    Medications and Allergies     Medications  Outpatient Medications Marked as Taking for the 10/5/23 encounter (Office Visit) with Alfonso Wagoner, DO   Medication Sig Dispense Refill    albuterol (PROVENTIL/VENTOLIN HFA) 90 mcg/actuation inhaler Inhale 1-2 puffs into the lungs every 4 (four) hours as needed for Wheezing. Rescue 18 g 2    albuterol-ipratropium (DUO-NEB) 2.5 mg-0.5 mg/3 mL nebulizer solution Take 3 mLs by nebulization every 6 (six) hours as needed for Wheezing. Rescue 100 mL 2    amLODIPine (NORVASC) 5 MG tablet Take 1 tablet (5 mg total) by mouth 2 (two) times daily. 180 tablet 1    ascorbic acid, vitamin C, (VITAMIN C) 500 MG tablet Take 500 mg by mouth once daily.      aspirin (ECOTRIN) 81 MG EC tablet Take 1 tablet (81 mg total) by mouth once daily. 30 tablet 0    atorvastatin (LIPITOR) 80 MG tablet Take 1 tablet (80 mg total) by mouth once daily. 90 tablet 0    calcium-vitamin D tablet 600 mg-200 units Take 1 tablet by mouth 2 (two) times a day.      desvenlafaxine succinate (PRISTIQ) 100 MG Tb24 Take 1 tablet (100 mg total) by mouth once daily. 90 tablet 1    diaper,brief,adult,disposable Misc 1 each by Misc.(Non-Drug; Combo Route) route continuous prn (prn). 120 each 5    diclofenac sodium (VOLTAREN) 1 % Gel Apply 4 grams to large joints 4 times daily 450 g 1    furosemide (LASIX) 20 MG tablet Take 1 tablet (20 mg total) by mouth once daily. 90 tablet 1    gabapentin (NEURONTIN) 300 MG capsule TAKE 1 CAPSULE BY MOUTH IN THE MORNING, TAKE 1 CAPSULE at LUNCH, AND TAKE 2 CAPSULES AT BEDTIME 380 capsule 1    HYDROcodone-acetaminophen (NORCO)  mg per tablet 05/27/21 TAKE 1 TABLET BY MOUTH EVERY 8 HOURS AS NEEDED      ipratropium (ATROVENT) 42 mcg (0.06 %) nasal spray 2 sprays by Each Nostril route 4 (four) times daily. 15 mL 5    losartan (COZAAR) 100 MG tablet Take 1 tablet (100 mg total) by mouth once daily. 90 tablet 1    omeprazole (PRILOSEC) 20 MG capsule  Take 1 capsule (20 mg total) by mouth once daily. 90 capsule 1    primidone (MYSOLINE) 50 MG Tab TAKE 2 TABLETS BY MOUTH EVERY MORNING AND TAKE 3 TABLETS BY MOUTH EVERY EVENING 450 tablet 1    propranoloL (INDERAL) 40 MG tablet Take 1 tablet (40 mg total) by mouth 2 (two) times daily. 180 tablet 1    pyrilam-chlophed-acetaminophen 12.5-12.5-160 mg/5 mL Liqd Take 10 mLs by mouth every 8 (eight) hours as needed (COUGH). 473 mL 0    rivaroxaban (XARELTO) 20 mg Tab Take 1 tablet (20 mg total) by mouth once daily. 90 tablet 1    senna-docusate 8.6-50 mg (PERICOLACE) 8.6-50 mg per tablet Take 1 tablet by mouth once daily.      tiZANidine (ZANAFLEX) 4 MG tablet Take 1 tablet (4 mg total) by mouth 2 (two) times daily as needed (muscle spasms). 60 tablet 2    zinc gluconate 50 mg tablet Take 50 mg by mouth once daily.         Allergies  Review of patient's allergies indicates:  No Known Allergies    Physical Examination     Vitals:    10/05/23 0953   BP: 130/60   Pulse: 75   Resp: 18   Temp: 97.5 °F (36.4 °C)     Physical Exam  Constitutional:       General: She is not in acute distress.     Appearance: She is not ill-appearing, toxic-appearing or diaphoretic.   HENT:      Head: Normocephalic and atraumatic.      Right Ear: External ear normal.      Left Ear: External ear normal.      Mouth/Throat:      Mouth: Mucous membranes are moist.      Pharynx: No oropharyngeal exudate or posterior oropharyngeal erythema.   Eyes:      General: No scleral icterus.        Right eye: No discharge.         Left eye: No discharge.      Extraocular Movements: Extraocular movements intact.      Conjunctiva/sclera: Conjunctivae normal.      Pupils: Pupils are equal, round, and reactive to light.   Neck:      Vascular: No carotid bruit.   Cardiovascular:      Rate and Rhythm: Normal rate and regular rhythm.      Heart sounds: No murmur heard.     No friction rub. No gallop.   Pulmonary:      Effort: No respiratory distress.      Breath sounds:  No stridor. No wheezing, rhonchi or rales.   Abdominal:      General: Abdomen is flat. There is no distension.      Tenderness: There is no abdominal tenderness. There is no guarding.   Musculoskeletal:         General: No swelling.      Right lower leg: No edema.      Left lower leg: No edema.   Skin:     General: Skin is warm and dry.      Coloration: Skin is not jaundiced.   Neurological:      Mental Status: She is alert and oriented to person, place, and time.      Motor: Weakness present.         Assessment and Plan (including Health Maintenance)      Problem List  Smart Sets  Document Outside HM   :    Plan:         Health Maintenance Due   Topic Date Due    COVID-19 Vaccine (1) Never done    TETANUS VACCINE  Never done    Shingles Vaccine (1 of 2) Never done    Influenza Vaccine (1) 09/01/2023       Problem List Items Addressed This Visit          Orthopedic    Back pain    Relevant Medications    pregabalin (LYRICA) 25 MG capsule  Given patient has historically uncontrolled hypertension and recent CVA, steroid injection will not be administered today.  Patient will be started on Lyrica for back pain.  Patient warned that this medication  may be highly sedating.  Follow up as needed.     Other Visit Diagnoses       Cerebrovascular accident (CVA), unspecified mechanism    -  Primary    Relevant Orders    Ambulatory referral/consult to Neurology    Mass of right parotid gland        Relevant Orders    Ambulatory referral/consult to ENT            Health Maintenance Topics with due status: Not Due       Topic Last Completion Date    DEXA Scan 07/27/2023    Lipid Panel 09/26/2023       Future Appointments   Date Time Provider Department Center   11/6/2023 10:00 AM Alfonso Wagoner DO [facility] [place]   11/15/2023  9:00 AM [facility] [place]   12/4/2023 10:30 AM INFUSION, [facility] Carlos Eduardo Canseco M            Signature:  Alfonso Wagoner DO  [facility]  [id number] [place] [id number]  [phone]    Date of encounter: 10/5/23

## 2023-10-05 NOTE — PATIENT INSTRUCTIONS
Discontinue Celebrex as it may, in conjunction, with blood thinner and antiplatelet, may lead to GI Bleed.

## 2023-10-05 NOTE — ASSESSMENT & PLAN NOTE
Patient is complaining of persistent back pain.  She has an upcoming appointment with the pain clinic in November.  Patient is requesting steroid injection.  Steroid injection will not be given given increased risk for bleeding in the setting of anticoagulant and anti-platelet medication.  Patient reports that she has used lidocaine, anticonvulsants, narcotics and other therapies without relief of symptoms.  Patient offered Lyrica for pain.  Patient advised at length that this medication could increase her sedation, particularly when combined with gabapentin and some of her other medications.  Patient is adamant and would like something for pain.  Lyrica initiated with follow up in 1 month.  Patient encouraged to keep her appointments with the pain clinic.  Patient understands and agrees with plan of care.

## 2023-10-05 NOTE — ASSESSMENT & PLAN NOTE
Patient is on Xarelto and aspirin as well as high-intensity statin.  Blood pressure appears to be well controlled today.  Patient reports improvement and

## 2023-10-09 ENCOUNTER — EXTERNAL HOME HEALTH (OUTPATIENT)
Dept: HOME HEALTH SERVICES | Facility: HOSPITAL | Age: 78
End: 2023-10-09
Payer: MEDICARE

## 2023-10-13 DIAGNOSIS — M16.11 PRIMARY OSTEOARTHRITIS OF RIGHT HIP: ICD-10-CM

## 2023-10-13 RX ORDER — DICLOFENAC SODIUM 10 MG/G
GEL TOPICAL
Qty: 450 G | Refills: 1 | Status: SHIPPED | OUTPATIENT
Start: 2023-10-13 | End: 2023-10-19 | Stop reason: SDUPTHER

## 2023-10-16 ENCOUNTER — OFFICE VISIT (OUTPATIENT)
Dept: OTOLARYNGOLOGY | Facility: CLINIC | Age: 78
End: 2023-10-16
Payer: MEDICARE

## 2023-10-16 ENCOUNTER — TELEPHONE (OUTPATIENT)
Dept: FAMILY MEDICINE | Facility: CLINIC | Age: 78
End: 2023-10-16
Payer: MEDICARE

## 2023-10-16 VITALS — BODY MASS INDEX: 29.98 KG/M2 | HEIGHT: 67 IN | WEIGHT: 191 LBS

## 2023-10-16 DIAGNOSIS — K11.8 MASS OF RIGHT PAROTID GLAND: ICD-10-CM

## 2023-10-16 PROCEDURE — 1160F PR REVIEW ALL MEDS BY PRESCRIBER/CLIN PHARMACIST DOCUMENTED: ICD-10-PCS | Mod: CPTII,,, | Performed by: OTOLARYNGOLOGY

## 2023-10-16 PROCEDURE — 99215 OFFICE O/P EST HI 40 MIN: CPT | Mod: PBBFAC | Performed by: OTOLARYNGOLOGY

## 2023-10-16 PROCEDURE — 1111F PR DISCHARGE MEDS RECONCILED W/ CURRENT OUTPATIENT MED LIST: ICD-10-PCS | Mod: CPTII,,, | Performed by: OTOLARYNGOLOGY

## 2023-10-16 PROCEDURE — 99204 PR OFFICE/OUTPT VISIT, NEW, LEVL IV, 45-59 MIN: ICD-10-PCS | Mod: S$PBB,,, | Performed by: OTOLARYNGOLOGY

## 2023-10-16 PROCEDURE — 1159F PR MEDICATION LIST DOCUMENTED IN MEDICAL RECORD: ICD-10-PCS | Mod: CPTII,,, | Performed by: OTOLARYNGOLOGY

## 2023-10-16 PROCEDURE — 1159F MED LIST DOCD IN RCRD: CPT | Mod: CPTII,,, | Performed by: OTOLARYNGOLOGY

## 2023-10-16 PROCEDURE — 1160F RVW MEDS BY RX/DR IN RCRD: CPT | Mod: CPTII,,, | Performed by: OTOLARYNGOLOGY

## 2023-10-16 PROCEDURE — 1111F DSCHRG MED/CURRENT MED MERGE: CPT | Mod: CPTII,,, | Performed by: OTOLARYNGOLOGY

## 2023-10-16 PROCEDURE — 99204 OFFICE O/P NEW MOD 45 MIN: CPT | Mod: S$PBB,,, | Performed by: OTOLARYNGOLOGY

## 2023-10-16 NOTE — PROGRESS NOTES
Subjective:       Patient ID: Felicia Keita is a 78 y.o. female.    Chief Complaint: Mass (Right side of neck.)  Seen on CT   Mass      Review of Systems   HENT:  Positive for facial swelling.    All other systems reviewed and are negative.      Objective:      Physical Exam  General: NAD No parotid mass palpated   Head: Normocephalic, atraumatic, no facial asymmetry/normal strength,  Ears: Both auricules normal in appearance, w/o deformities tympanic membranes normal external auditory canals normal  Nose: External nose w/o deformities normal turbinates no drainage or inflammation  Oral Cavity: Lips, gums, floor of mouth, tongue hard palate, and buccal mucosa without mass/lesion  Oropharynx: Mucosa pink and moist, soft palate, posterior pharynx and oropharyngeal wall without mass/lesion  Neck: Supple, symmetric, trachea midline, no palpable mass/lesion, no palpable cervical lymphadenopathy  Skin: Warm and dry, no concerning lesions  Respiratory: Respirations even, unlabored   Assessment:       1. Mass of right parotid gland        Plan:       CT reviewed Parotid superficial mass not felt on exam today will watch for now

## 2023-10-16 NOTE — TELEPHONE ENCOUNTER
"Received communication from [facility] nurse Odette Astudillo LPN asking to clarify if the Pt should be taking both Lyrica and Gabapentin.     The nurse also reported that the Pt states she has been "cutting down" on some of her medications by taking propanolol 40mg once daily instead of BID as prescribed, and has only been taking pramipexole 0.125mg 1 tablet at bedtime instead of 2 tablets as prescribed.    Please advise.   "

## 2023-10-17 ENCOUNTER — OFFICE VISIT (OUTPATIENT)
Dept: FAMILY MEDICINE | Facility: CLINIC | Age: 78
End: 2023-10-17
Payer: MEDICARE

## 2023-10-17 VITALS
BODY MASS INDEX: 30.53 KG/M2 | TEMPERATURE: 98 F | WEIGHT: 194.5 LBS | RESPIRATION RATE: 18 BRPM | HEIGHT: 67 IN | DIASTOLIC BLOOD PRESSURE: 77 MMHG | HEART RATE: 62 BPM | OXYGEN SATURATION: 99 % | SYSTOLIC BLOOD PRESSURE: 167 MMHG

## 2023-10-17 DIAGNOSIS — M54.42 CHRONIC LOW BACK PAIN WITH LEFT-SIDED SCIATICA, UNSPECIFIED BACK PAIN LATERALITY: Primary | ICD-10-CM

## 2023-10-17 DIAGNOSIS — G89.29 CHRONIC LOW BACK PAIN WITH LEFT-SIDED SCIATICA, UNSPECIFIED BACK PAIN LATERALITY: Primary | ICD-10-CM

## 2023-10-17 PROCEDURE — 1101F PT FALLS ASSESS-DOCD LE1/YR: CPT | Mod: ,,, | Performed by: NURSE PRACTITIONER

## 2023-10-17 PROCEDURE — 3077F PR MOST RECENT SYSTOLIC BLOOD PRESSURE >= 140 MM HG: ICD-10-PCS | Mod: ,,, | Performed by: NURSE PRACTITIONER

## 2023-10-17 PROCEDURE — 1159F PR MEDICATION LIST DOCUMENTED IN MEDICAL RECORD: ICD-10-PCS | Mod: ,,, | Performed by: NURSE PRACTITIONER

## 2023-10-17 PROCEDURE — 1159F MED LIST DOCD IN RCRD: CPT | Mod: ,,, | Performed by: NURSE PRACTITIONER

## 2023-10-17 PROCEDURE — 1160F PR REVIEW ALL MEDS BY PRESCRIBER/CLIN PHARMACIST DOCUMENTED: ICD-10-PCS | Mod: ,,, | Performed by: NURSE PRACTITIONER

## 2023-10-17 PROCEDURE — 3288F PR FALLS RISK ASSESSMENT DOCUMENTED: ICD-10-PCS | Mod: ,,, | Performed by: NURSE PRACTITIONER

## 2023-10-17 PROCEDURE — 1111F PR DISCHARGE MEDS RECONCILED W/ CURRENT OUTPATIENT MED LIST: ICD-10-PCS | Mod: ,,, | Performed by: NURSE PRACTITIONER

## 2023-10-17 PROCEDURE — 1126F PR PAIN SEVERITY QUANTIFIED, NO PAIN PRESENT: ICD-10-PCS | Mod: ,,, | Performed by: NURSE PRACTITIONER

## 2023-10-17 PROCEDURE — 3077F SYST BP >= 140 MM HG: CPT | Mod: ,,, | Performed by: NURSE PRACTITIONER

## 2023-10-17 PROCEDURE — 3078F PR MOST RECENT DIASTOLIC BLOOD PRESSURE < 80 MM HG: ICD-10-PCS | Mod: ,,, | Performed by: NURSE PRACTITIONER

## 2023-10-17 PROCEDURE — 1160F RVW MEDS BY RX/DR IN RCRD: CPT | Mod: ,,, | Performed by: NURSE PRACTITIONER

## 2023-10-17 PROCEDURE — 3288F FALL RISK ASSESSMENT DOCD: CPT | Mod: ,,, | Performed by: NURSE PRACTITIONER

## 2023-10-17 PROCEDURE — 1101F PR PT FALLS ASSESS DOC 0-1 FALLS W/OUT INJ PAST YR: ICD-10-PCS | Mod: ,,, | Performed by: NURSE PRACTITIONER

## 2023-10-17 PROCEDURE — 96372 THER/PROPH/DIAG INJ SC/IM: CPT | Mod: ,,, | Performed by: NURSE PRACTITIONER

## 2023-10-17 PROCEDURE — 99213 OFFICE O/P EST LOW 20 MIN: CPT | Mod: 25,,, | Performed by: NURSE PRACTITIONER

## 2023-10-17 PROCEDURE — 3078F DIAST BP <80 MM HG: CPT | Mod: ,,, | Performed by: NURSE PRACTITIONER

## 2023-10-17 PROCEDURE — 1126F AMNT PAIN NOTED NONE PRSNT: CPT | Mod: ,,, | Performed by: NURSE PRACTITIONER

## 2023-10-17 PROCEDURE — 96372 PR INJECTION,THERAP/PROPH/DIAG2ST, IM OR SUBCUT: ICD-10-PCS | Mod: ,,, | Performed by: NURSE PRACTITIONER

## 2023-10-17 PROCEDURE — 99213 PR OFFICE/OUTPT VISIT, EST, LEVL III, 20-29 MIN: ICD-10-PCS | Mod: 25,,, | Performed by: NURSE PRACTITIONER

## 2023-10-17 PROCEDURE — 1111F DSCHRG MED/CURRENT MED MERGE: CPT | Mod: ,,, | Performed by: NURSE PRACTITIONER

## 2023-10-17 RX ORDER — METHYLPREDNISOLONE ACETATE 40 MG/ML
40 INJECTION, SUSPENSION INTRA-ARTICULAR; INTRALESIONAL; INTRAMUSCULAR; SOFT TISSUE
Status: COMPLETED | OUTPATIENT
Start: 2023-10-17 | End: 2023-10-17

## 2023-10-17 RX ORDER — DEXAMETHASONE SODIUM PHOSPHATE 4 MG/ML
4 INJECTION, SOLUTION INTRA-ARTICULAR; INTRALESIONAL; INTRAMUSCULAR; INTRAVENOUS; SOFT TISSUE
Status: COMPLETED | OUTPATIENT
Start: 2023-10-17 | End: 2023-10-17

## 2023-10-17 RX ADMIN — DEXAMETHASONE SODIUM PHOSPHATE 4 MG: 4 INJECTION, SOLUTION INTRA-ARTICULAR; INTRALESIONAL; INTRAMUSCULAR; INTRAVENOUS; SOFT TISSUE at 11:10

## 2023-10-17 RX ADMIN — METHYLPREDNISOLONE ACETATE 40 MG: 40 INJECTION, SUSPENSION INTRA-ARTICULAR; INTRALESIONAL; INTRAMUSCULAR; SOFT TISSUE at 11:10

## 2023-10-17 NOTE — PROGRESS NOTES
Clinic Note    Felicia Keita is a 78 y.o. female     Chief Complaint:   Chief Complaint   Patient presents with    \[facility]\"" Care        Subjective:    Patient complains of back pain. Patient reports she has had multiple back surgeries. Patient admits to seeing pain treatment routinely. Patient states steroid injection help give her some relief. Patient requesting steroid injection today. Patient reports at previous visit she was given Lyrica 25mg po bid. Patient inquiring about taking gabapentin and lyrica together. Patient states she cannot tell a difference with lyrica added. Patient reports she takes gabapentin at night due to drowsiness.   Patient also reports red rash to legs, with left being worse. Denies itching.   Patient reports she has HH.   Patient states she saw Dr. Hernandez yesterday.   Patient admits to cva about a month ago.          Allergies:   Review of patient's allergies indicates:  No Known Allergies     Past Medical History:  Past Medical History:   Diagnosis Date    Abnormal gait 05/22/2013    Actinic keratosis 08/12/2020    L forearm     Allergic rhinitis 04/07/2020    Blepharophimosis 04/22/2014    senile    Cervical somatic dysfunction 08/15/2017    Cervicalgia 03/28/2019    Chronic peripheral venous hypertension 06/16/2015    Chronic serous otitis media, bilateral 09/05/2019    Chronic venous hypertension (idiopathic) without complications of unspecified lower extremity 08/15/2017    Conjunctival hemorrhage, right eye 11/02/2020    Deep venous thrombosis of lower extremity 09/17/2012    Degeneration of cervical intervertebral disc 10/21/2011    Degeneration of lumbar intervertebral disc 10/21/2011    Degenerative joint disease involving multiple joints 07/25/2011    Depressive disorder 07/25/2011    Essential hypertension 08/12/2020    Essential tremor 06/24/2014    GERD without esophagitis 08/15/2017    Headache 04/07/2020    Hyperlipidemia 05/08/2012    Insomnia 04/06/2016     Osteoarthritis 01/04/2017    Osteoporosis 11/07/2017    Otalgia, right ear 10/17/2016    Other cervical disc degeneration, unspecified cervical region 08/15/2017    Overflow incontinence 01/06/2020    Pain in right knee 01/06/2020    osteoarthritis    Pain in right leg 09/10/2020    Peripheral arterial occlusive disease 02/24/2015    Peripheral vascular disease, unspecified 08/06/2019    Peripheral venous insufficiency 09/29/2015    Personal history of PE (pulmonary embolism) 08/06/2019    Presence of vena cava filter 03/2015    Pulmonary embolus 03/10/2015    Pulmonary infarction 03/16/2012    Pure hypercholesterolemia 07/25/2011    Restless legs 05/22/2018    Right temporomandibular joint disorder, unspecified 08/18/2020    Rosacea 07/01/2015    Sciatica, right side 07/06/2016    Seborrheic keratosis 01/07/2021    Segmental and somatic dysfunction of thoracic region 03/28/2019    Senile osteoporosis 04/24/2017    Spasm of back muscles 07/25/2011    Stroke 09/25/2023    Trochanteric bursitis of right hip 01/06/2020    Unspecified urinary incontinence 02/07/2020    Vitamin D deficiency 10/27/2014        Current Medications:    Current Outpatient Medications:     albuterol (PROVENTIL/VENTOLIN HFA) 90 mcg/actuation inhaler, Inhale 1-2 puffs into the lungs every 4 (four) hours as needed for Wheezing. Rescue, Disp: 18 g, Rfl: 2    albuterol-ipratropium (DUO-NEB) 2.5 mg-0.5 mg/3 mL nebulizer solution, Take 3 mLs by nebulization every 6 (six) hours as needed for Wheezing. Rescue, Disp: 100 mL, Rfl: 2    amLODIPine (NORVASC) 5 MG tablet, Take 1 tablet (5 mg total) by mouth 2 (two) times daily., Disp: 180 tablet, Rfl: 1    ascorbic acid, vitamin C, (VITAMIN C) 500 MG tablet, Take 500 mg by mouth once daily., Disp: , Rfl:     aspirin (ECOTRIN) 81 MG EC tablet, Take 1 tablet (81 mg total) by mouth once daily., Disp: 30 tablet, Rfl: 0    atorvastatin (LIPITOR) 80 MG tablet, Take 1 tablet (80 mg total) by mouth once daily.,  Disp: 90 tablet, Rfl: 0    calcium-vitamin D tablet 600 mg-200 units, Take 1 tablet by mouth 2 (two) times a day., Disp: , Rfl:     cetirizine (ZYRTEC) 10 MG tablet, Take 1 tablet (10 mg total) by mouth once daily., Disp: 30 tablet, Rfl: 0    desvenlafaxine succinate (PRISTIQ) 100 MG Tb24, Take 1 tablet (100 mg total) by mouth once daily., Disp: 90 tablet, Rfl: 1    diaper,brief,adult,disposable Misc, 1 each by Misc.(Non-Drug; Combo Route) route continuous prn (prn)., Disp: 120 each, Rfl: 5    diclofenac sodium (VOLTAREN) 1 % Gel, Apply 4 grams to large joints 4 times daily, Disp: 450 g, Rfl: 1    furosemide (LASIX) 20 MG tablet, Take 1 tablet (20 mg total) by mouth once daily., Disp: 90 tablet, Rfl: 1    gabapentin (NEURONTIN) 300 MG capsule, TAKE 1 CAPSULE BY MOUTH IN THE MORNING, TAKE 1 CAPSULE at LUNCH, AND TAKE 2 CAPSULES AT BEDTIME, Disp: 380 capsule, Rfl: 1    HYDROcodone-acetaminophen (NORCO)  mg per tablet, 05/27/21 TAKE 1 TABLET BY MOUTH EVERY 8 HOURS AS NEEDED, Disp: , Rfl:     ipratropium (ATROVENT) 42 mcg (0.06 %) nasal spray, 2 sprays by Each Nostril route 4 (four) times daily., Disp: 15 mL, Rfl: 5    losartan (COZAAR) 100 MG tablet, Take 1 tablet (100 mg total) by mouth once daily., Disp: 90 tablet, Rfl: 1    omeprazole (PRILOSEC) 20 MG capsule, Take 1 capsule (20 mg total) by mouth once daily., Disp: 90 capsule, Rfl: 1    pregabalin (LYRICA) 25 MG capsule, Take 1 capsule (25 mg total) by mouth 2 (two) times daily., Disp: 180 capsule, Rfl: 0    primidone (MYSOLINE) 50 MG Tab, TAKE 2 TABLETS BY MOUTH EVERY MORNING AND TAKE 3 TABLETS BY MOUTH EVERY EVENING, Disp: 450 tablet, Rfl: 1    rivaroxaban (XARELTO) 20 mg Tab, Take 1 tablet (20 mg total) by mouth once daily., Disp: 90 tablet, Rfl: 1    senna-docusate 8.6-50 mg (PERICOLACE) 8.6-50 mg per tablet, Take 1 tablet by mouth once daily., Disp: , Rfl:     tiZANidine (ZANAFLEX) 4 MG tablet, Take 1 tablet (4 mg total) by mouth 2 (two) times daily as  "needed (muscle spasms)., Disp: 60 tablet, Rfl: 2    zinc gluconate 50 mg tablet, Take 50 mg by mouth once daily., Disp: , Rfl:   No current facility-administered medications for this visit.       Review of Systems   Constitutional:  Negative for fever.   Respiratory:  Negative for cough and shortness of breath.    Cardiovascular:  Negative for chest pain.   Gastrointestinal:  Negative for abdominal pain.   Musculoskeletal:  Positive for back pain and gait problem.          Objective:    BP (!) 167/77 (BP Location: Right arm, Patient Position: Sitting, BP Method: Medium (Automatic))   Pulse 62   Temp 98.1 °F (36.7 °C) (Oral)   Resp 18   Ht 5' 7" (1.702 m)   Wt 88.2 kg (194 lb 8 oz)   LMP  (LMP Unknown) Comment: see surgical history  SpO2 99%   BMI 30.46 kg/m²      Physical Exam  Constitutional:       Appearance: Normal appearance.   Eyes:      Extraocular Movements: Extraocular movements intact.   Cardiovascular:      Rate and Rhythm: Normal rate and regular rhythm.      Pulses: Normal pulses.      Heart sounds: Normal heart sounds.   Pulmonary:      Effort: Pulmonary effort is normal.      Breath sounds: Normal breath sounds.   Abdominal:      Palpations: Abdomen is soft.      Tenderness: There is no abdominal tenderness.   Skin:     Findings: Erythema and rash present.      Comments: Mild erythematous rash to ble, more so left. Appears as a dermatitis, not cellulitis   Neurological:      Mental Status: She is alert and oriented to person, place, and time.      Gait: Gait abnormal.          Assessment and Plan:    1. Chronic low back pain with left-sided sciatica, unspecified back pain laterality         Chronic low back pain with left-sided sciatica, unspecified back pain laterality  -     dexAMETHasone injection 4 mg  -     methylPREDNISolone acetate injection 40 mg  -discussed side effects  -d/c lyrica since no improvement  -discussed how to take gabapentin  -discuss with pain treatment and f/u as " scheduled        There are no Patient Instructions on file for this visit.   Follow up in about 3 months (around 1/17/2024).

## 2023-10-19 DIAGNOSIS — M16.11 PRIMARY OSTEOARTHRITIS OF RIGHT HIP: ICD-10-CM

## 2023-10-19 RX ORDER — DICLOFENAC SODIUM 10 MG/G
GEL TOPICAL
Qty: 450 G | Refills: 1 | Status: SHIPPED | OUTPATIENT
Start: 2023-10-19 | End: 2024-01-11

## 2023-10-23 NOTE — TELEPHONE ENCOUNTER
Pt was seen at [facility] on 10/17/2023 and reported that the Lyrica has not helped. Lyrica was discontinued during this visit. Notified Pt's home health nurse Odette Astudillo LPN with [facility].

## 2023-11-06 ENCOUNTER — DOCUMENT SCAN (OUTPATIENT)
Dept: HOME HEALTH SERVICES | Facility: HOSPITAL | Age: 78
End: 2023-11-06
Payer: MEDICARE

## 2023-11-09 RX ORDER — AZITHROMYCIN 250 MG/1
TABLET, FILM COATED ORAL
Qty: 6 TABLET | Refills: 0 | Status: SHIPPED | OUTPATIENT
Start: 2023-11-09 | End: 2023-11-14

## 2023-11-13 ENCOUNTER — DOCUMENT SCAN (OUTPATIENT)
Dept: HOME HEALTH SERVICES | Facility: HOSPITAL | Age: 78
End: 2023-11-13
Payer: MEDICARE

## 2023-11-13 ENCOUNTER — DOCUMENT SCAN (OUTPATIENT)
Dept: HOME HEALTH SERVICES | Facility: HOSPITAL | Age: 78
End: 2023-11-13

## 2023-11-14 ENCOUNTER — DOCUMENT SCAN (OUTPATIENT)
Dept: HOME HEALTH SERVICES | Facility: HOSPITAL | Age: 78
End: 2023-11-14
Payer: MEDICARE

## 2023-11-17 DIAGNOSIS — H65.23 BILATERAL CHRONIC SEROUS OTITIS MEDIA: ICD-10-CM

## 2023-11-17 DIAGNOSIS — M16.11 PRIMARY OSTEOARTHRITIS OF RIGHT HIP: ICD-10-CM

## 2023-11-17 DIAGNOSIS — M81.0 OSTEOPOROSIS, UNSPECIFIED OSTEOPOROSIS TYPE, UNSPECIFIED PATHOLOGICAL FRACTURE PRESENCE: Primary | ICD-10-CM

## 2023-11-17 RX ORDER — IPRATROPIUM BROMIDE 42 UG/1
2 SPRAY, METERED NASAL 4 TIMES DAILY
Qty: 15 ML | Refills: 5 | Status: CANCELLED | OUTPATIENT
Start: 2023-11-17

## 2023-12-04 ENCOUNTER — INFUSION (OUTPATIENT)
Dept: INFUSION THERAPY | Facility: HOSPITAL | Age: 78
End: 2023-12-04
Attending: NURSE PRACTITIONER
Payer: MEDICARE

## 2023-12-04 VITALS
SYSTOLIC BLOOD PRESSURE: 165 MMHG | OXYGEN SATURATION: 97 % | BODY MASS INDEX: 30.07 KG/M2 | HEART RATE: 59 BPM | WEIGHT: 192 LBS | TEMPERATURE: 98 F | RESPIRATION RATE: 18 BRPM | DIASTOLIC BLOOD PRESSURE: 57 MMHG

## 2023-12-04 DIAGNOSIS — M81.0 AGE-RELATED OSTEOPOROSIS WITHOUT CURRENT PATHOLOGICAL FRACTURE: Primary | ICD-10-CM

## 2023-12-04 PROCEDURE — 63600175 PHARM REV CODE 636 W HCPCS: Mod: JZ,TB | Performed by: NURSE PRACTITIONER

## 2023-12-04 PROCEDURE — 96372 THER/PROPH/DIAG INJ SC/IM: CPT

## 2023-12-04 RX ADMIN — DENOSUMAB 60 MG: 60 INJECTION SUBCUTANEOUS at 10:12

## 2023-12-04 NOTE — PROGRESS NOTES
1000 Patient in room 1. Calcium level today was 8.9     1045 Administered Prolia 60mg SQ to left arm per protocol.  Patient tolerated well.  Will continue to monitor for adverse reactions.     1105 Patient discharged to home ambulatory with appt in hand to return in 6 months for next prolia injection.

## 2023-12-07 ENCOUNTER — DOCUMENT SCAN (OUTPATIENT)
Dept: HOME HEALTH SERVICES | Facility: HOSPITAL | Age: 78
End: 2023-12-07
Payer: MEDICARE

## 2023-12-11 DIAGNOSIS — J44.9 CHRONIC OBSTRUCTIVE PULMONARY DISEASE, UNSPECIFIED COPD TYPE: ICD-10-CM

## 2023-12-11 DIAGNOSIS — M16.11 PRIMARY OSTEOARTHRITIS OF RIGHT HIP: ICD-10-CM

## 2023-12-11 DIAGNOSIS — H65.23 BILATERAL CHRONIC SEROUS OTITIS MEDIA: ICD-10-CM

## 2023-12-11 RX ORDER — IPRATROPIUM BROMIDE 42 UG/1
2 SPRAY, METERED NASAL 4 TIMES DAILY
Qty: 15 ML | Refills: 5 | OUTPATIENT
Start: 2023-12-11

## 2023-12-11 RX ORDER — ATORVASTATIN CALCIUM 80 MG/1
80 TABLET, FILM COATED ORAL DAILY
Qty: 90 TABLET | Refills: 0 | OUTPATIENT
Start: 2023-12-11 | End: 2024-03-10

## 2023-12-11 RX ORDER — ALBUTEROL SULFATE 90 UG/1
1-2 AEROSOL, METERED RESPIRATORY (INHALATION) EVERY 4 HOURS PRN
Qty: 18 G | Refills: 2 | OUTPATIENT
Start: 2023-12-11

## 2023-12-12 RX ORDER — ATORVASTATIN CALCIUM 80 MG/1
80 TABLET, FILM COATED ORAL DAILY
Qty: 30 TABLET | Refills: 0 | Status: SHIPPED | OUTPATIENT
Start: 2023-12-12 | End: 2024-01-11 | Stop reason: SDUPTHER

## 2023-12-12 RX ORDER — ALBUTEROL SULFATE 90 UG/1
1-2 AEROSOL, METERED RESPIRATORY (INHALATION) EVERY 4 HOURS PRN
Qty: 18 G | Refills: 1 | Status: SHIPPED | OUTPATIENT
Start: 2023-12-12 | End: 2024-01-11 | Stop reason: SDUPTHER

## 2023-12-13 DIAGNOSIS — H65.23 BILATERAL CHRONIC SEROUS OTITIS MEDIA: ICD-10-CM

## 2023-12-13 DIAGNOSIS — M16.11 PRIMARY OSTEOARTHRITIS OF RIGHT HIP: ICD-10-CM

## 2023-12-13 RX ORDER — IPRATROPIUM BROMIDE 42 UG/1
2 SPRAY, METERED NASAL 4 TIMES DAILY
Qty: 15 ML | Refills: 0 | Status: SHIPPED | OUTPATIENT
Start: 2023-12-13 | End: 2024-01-11 | Stop reason: SDUPTHER

## 2023-12-13 RX ORDER — IPRATROPIUM BROMIDE 42 UG/1
2 SPRAY, METERED NASAL 4 TIMES DAILY
Qty: 15 ML | Refills: 5 | OUTPATIENT
Start: 2023-12-13

## 2024-01-08 PROBLEM — I63.9 CEREBROVASCULAR ACCIDENT (CVA): Status: RESOLVED | Noted: 2023-09-25 | Resolved: 2024-01-08

## 2024-01-11 DIAGNOSIS — M54.42 CHRONIC LOW BACK PAIN WITH LEFT-SIDED SCIATICA, UNSPECIFIED BACK PAIN LATERALITY: ICD-10-CM

## 2024-01-11 DIAGNOSIS — G89.29 CHRONIC LOW BACK PAIN WITH LEFT-SIDED SCIATICA, UNSPECIFIED BACK PAIN LATERALITY: ICD-10-CM

## 2024-01-11 DIAGNOSIS — H65.23 BILATERAL CHRONIC SEROUS OTITIS MEDIA: ICD-10-CM

## 2024-01-11 DIAGNOSIS — M16.0 PRIMARY OSTEOARTHRITIS OF BOTH HIPS: Primary | ICD-10-CM

## 2024-01-11 DIAGNOSIS — J44.9 CHRONIC OBSTRUCTIVE PULMONARY DISEASE, UNSPECIFIED COPD TYPE: ICD-10-CM

## 2024-01-11 DIAGNOSIS — M16.11 PRIMARY OSTEOARTHRITIS OF RIGHT HIP: ICD-10-CM

## 2024-01-11 RX ORDER — ATORVASTATIN CALCIUM 80 MG/1
80 TABLET, FILM COATED ORAL DAILY
Qty: 30 TABLET | Refills: 0 | Status: SHIPPED | OUTPATIENT
Start: 2024-01-11 | End: 2024-02-08 | Stop reason: SDUPTHER

## 2024-01-11 RX ORDER — IPRATROPIUM BROMIDE 42 UG/1
2 SPRAY, METERED NASAL 4 TIMES DAILY
Qty: 15 ML | Refills: 0 | Status: SHIPPED | OUTPATIENT
Start: 2024-01-11 | End: 2024-02-12 | Stop reason: SDUPTHER

## 2024-01-11 RX ORDER — IPRATROPIUM BROMIDE AND ALBUTEROL SULFATE 2.5; .5 MG/3ML; MG/3ML
3 SOLUTION RESPIRATORY (INHALATION) EVERY 6 HOURS PRN
Qty: 100 ML | Refills: 2 | Status: SHIPPED | OUTPATIENT
Start: 2024-01-11

## 2024-01-11 RX ORDER — ALBUTEROL SULFATE 90 UG/1
1-2 AEROSOL, METERED RESPIRATORY (INHALATION) EVERY 4 HOURS PRN
Qty: 18 G | Refills: 1 | Status: SHIPPED | OUTPATIENT
Start: 2024-01-11 | End: 2024-02-16 | Stop reason: SDUPTHER

## 2024-01-11 RX ORDER — PREGABALIN 25 MG/1
25 CAPSULE ORAL 2 TIMES DAILY
Qty: 180 CAPSULE | Refills: 0 | Status: SHIPPED | OUTPATIENT
Start: 2024-01-11 | End: 2024-04-10

## 2024-01-11 RX ORDER — DICLOFENAC SODIUM 30 MG/G
GEL TOPICAL 2 TIMES DAILY PRN
Qty: 100 G | Refills: 3 | Status: SHIPPED | OUTPATIENT
Start: 2024-01-11 | End: 2024-01-16

## 2024-01-11 NOTE — TELEPHONE ENCOUNTER
Pt Called and needs refills she is out of these meds, her appt with you is on 1/25/24. She is also requesting 2% Voltaran Gel, current on 1%

## 2024-01-16 DIAGNOSIS — M16.0 PRIMARY OSTEOARTHRITIS OF BOTH HIPS: Primary | ICD-10-CM

## 2024-01-16 RX ORDER — DICLOFENAC SODIUM 10 MG/G
2 GEL TOPICAL DAILY
Qty: 2 G | Refills: 3 | Status: SHIPPED | OUTPATIENT
Start: 2024-01-16 | End: 2024-02-16 | Stop reason: SDUPTHER

## 2024-01-25 ENCOUNTER — OFFICE VISIT (OUTPATIENT)
Dept: FAMILY MEDICINE | Facility: CLINIC | Age: 79
End: 2024-01-25
Payer: MEDICARE

## 2024-01-25 VITALS
BODY MASS INDEX: 29.44 KG/M2 | HEART RATE: 60 BPM | SYSTOLIC BLOOD PRESSURE: 160 MMHG | TEMPERATURE: 98 F | WEIGHT: 188 LBS | OXYGEN SATURATION: 98 % | DIASTOLIC BLOOD PRESSURE: 79 MMHG | RESPIRATION RATE: 19 BRPM

## 2024-01-25 DIAGNOSIS — E83.41 HYPERMAGNESEMIA: ICD-10-CM

## 2024-01-25 DIAGNOSIS — M54.42 CHRONIC BILATERAL LOW BACK PAIN WITH BILATERAL SCIATICA: Primary | ICD-10-CM

## 2024-01-25 DIAGNOSIS — I10 UNCONTROLLED HYPERTENSION: ICD-10-CM

## 2024-01-25 DIAGNOSIS — E78.2 MIXED HYPERLIPIDEMIA: ICD-10-CM

## 2024-01-25 DIAGNOSIS — M54.41 CHRONIC BILATERAL LOW BACK PAIN WITH BILATERAL SCIATICA: Primary | ICD-10-CM

## 2024-01-25 DIAGNOSIS — G89.29 CHRONIC BILATERAL LOW BACK PAIN WITH BILATERAL SCIATICA: Primary | ICD-10-CM

## 2024-01-25 DIAGNOSIS — R82.998 URINE WBC INCREASED: ICD-10-CM

## 2024-01-25 LAB
ALBUMIN SERPL BCP-MCNC: 4 G/DL (ref 3.5–5)
ALBUMIN/GLOB SERPL: 1.3 {RATIO}
ALP SERPL-CCNC: 97 U/L (ref 55–142)
ALT SERPL W P-5'-P-CCNC: 24 U/L (ref 13–56)
ANION GAP SERPL CALCULATED.3IONS-SCNC: 14 MMOL/L (ref 7–16)
AST SERPL W P-5'-P-CCNC: 26 U/L (ref 15–37)
BASOPHILS # BLD AUTO: 0.08 K/UL (ref 0–0.2)
BASOPHILS NFR BLD AUTO: 1.6 % (ref 0–1)
BILIRUB SERPL-MCNC: 0.3 MG/DL (ref ?–1.2)
BILIRUB SERPL-MCNC: ABNORMAL MG/DL
BLOOD URINE, POC: ABNORMAL
BUN SERPL-MCNC: 13 MG/DL (ref 7–18)
BUN/CREAT SERPL: 14 (ref 6–20)
CALCIUM SERPL-MCNC: 9.8 MG/DL (ref 8.5–10.1)
CHLORIDE SERPL-SCNC: 95 MMOL/L (ref 98–107)
CHOLEST SERPL-MCNC: 171 MG/DL (ref 0–200)
CHOLEST/HDLC SERPL: 1.9 {RATIO}
CO2 SERPL-SCNC: 30 MMOL/L (ref 21–32)
COLOR, POC UA: YELLOW
CREAT SERPL-MCNC: 0.96 MG/DL (ref 0.55–1.02)
DIFFERENTIAL METHOD BLD: ABNORMAL
EGFR (NO RACE VARIABLE) (RUSH/TITUS): 60 ML/MIN/1.73M2
EOSINOPHIL # BLD AUTO: 0.16 K/UL (ref 0–0.5)
EOSINOPHIL NFR BLD AUTO: 3.1 % (ref 1–4)
ERYTHROCYTE [DISTWIDTH] IN BLOOD BY AUTOMATED COUNT: 14.1 % (ref 11.5–14.5)
GLOBULIN SER-MCNC: 3.2 G/DL (ref 2–4)
GLUCOSE SERPL-MCNC: 93 MG/DL (ref 74–106)
GLUCOSE UR QL STRIP: ABNORMAL
HCT VFR BLD AUTO: 39.8 % (ref 38–47)
HDLC SERPL-MCNC: 88 MG/DL (ref 40–60)
HGB BLD-MCNC: 12.9 G/DL (ref 12–16)
IMM GRANULOCYTES # BLD AUTO: 0.01 K/UL (ref 0–0.04)
IMM GRANULOCYTES NFR BLD: 0.2 % (ref 0–0.4)
KETONES UR QL STRIP: ABNORMAL
LDLC SERPL CALC-MCNC: 72 MG/DL
LEUKOCYTE ESTERASE URINE, POC: ABNORMAL
LYMPHOCYTES # BLD AUTO: 1.58 K/UL (ref 1–4.8)
LYMPHOCYTES NFR BLD AUTO: 30.9 % (ref 27–41)
MAGNESIUM SERPL-MCNC: 2.2 MG/DL (ref 1.7–2.3)
MCH RBC QN AUTO: 28.6 PG (ref 27–31)
MCHC RBC AUTO-ENTMCNC: 32.4 G/DL (ref 32–36)
MCV RBC AUTO: 88.2 FL (ref 80–96)
MONOCYTES # BLD AUTO: 0.47 K/UL (ref 0–0.8)
MONOCYTES NFR BLD AUTO: 9.2 % (ref 2–6)
MPC BLD CALC-MCNC: 12 FL (ref 9.4–12.4)
NEUTROPHILS # BLD AUTO: 2.81 K/UL (ref 1.8–7.7)
NEUTROPHILS NFR BLD AUTO: 55 % (ref 53–65)
NITRITE, POC UA: ABNORMAL
NONHDLC SERPL-MCNC: 83 MG/DL
NRBC # BLD AUTO: 0 X10E3/UL
NRBC, AUTO (.00): 0 %
PH, POC UA: 7
PLATELET # BLD AUTO: 251 K/UL (ref 150–400)
POTASSIUM SERPL-SCNC: 3.6 MMOL/L (ref 3.5–5.1)
PROT SERPL-MCNC: 7.2 G/DL (ref 6.4–8.2)
PROTEIN, POC: ABNORMAL
RBC # BLD AUTO: 4.51 M/UL (ref 4.2–5.4)
SODIUM SERPL-SCNC: 135 MMOL/L (ref 136–145)
SPECIFIC GRAVITY, POC UA: 1.01
TRIGL SERPL-MCNC: 57 MG/DL (ref 35–150)
UROBILINOGEN, POC UA: 0.2
VLDLC SERPL-MCNC: 11 MG/DL
WBC # BLD AUTO: 5.11 K/UL (ref 4.5–11)

## 2024-01-25 PROCEDURE — 87077 CULTURE AEROBIC IDENTIFY: CPT | Mod: ,,, | Performed by: CLINICAL MEDICAL LABORATORY

## 2024-01-25 PROCEDURE — 80053 COMPREHEN METABOLIC PANEL: CPT | Mod: ,,, | Performed by: CLINICAL MEDICAL LABORATORY

## 2024-01-25 PROCEDURE — 1101F PT FALLS ASSESS-DOCD LE1/YR: CPT | Mod: ,,, | Performed by: NURSE PRACTITIONER

## 2024-01-25 PROCEDURE — 83735 ASSAY OF MAGNESIUM: CPT | Mod: ,,, | Performed by: CLINICAL MEDICAL LABORATORY

## 2024-01-25 PROCEDURE — 81003 URINALYSIS AUTO W/O SCOPE: CPT | Mod: QW,,, | Performed by: NURSE PRACTITIONER

## 2024-01-25 PROCEDURE — 87086 URINE CULTURE/COLONY COUNT: CPT | Mod: ,,, | Performed by: CLINICAL MEDICAL LABORATORY

## 2024-01-25 PROCEDURE — 99214 OFFICE O/P EST MOD 30 MIN: CPT | Mod: 25,,, | Performed by: NURSE PRACTITIONER

## 2024-01-25 PROCEDURE — 3288F FALL RISK ASSESSMENT DOCD: CPT | Mod: ,,, | Performed by: NURSE PRACTITIONER

## 2024-01-25 PROCEDURE — 3077F SYST BP >= 140 MM HG: CPT | Mod: ,,, | Performed by: NURSE PRACTITIONER

## 2024-01-25 PROCEDURE — 87186 SC STD MICRODIL/AGAR DIL: CPT | Mod: ,,, | Performed by: CLINICAL MEDICAL LABORATORY

## 2024-01-25 PROCEDURE — 85025 COMPLETE CBC W/AUTO DIFF WBC: CPT | Mod: ,,, | Performed by: CLINICAL MEDICAL LABORATORY

## 2024-01-25 PROCEDURE — 96372 THER/PROPH/DIAG INJ SC/IM: CPT | Mod: ,,, | Performed by: NURSE PRACTITIONER

## 2024-01-25 PROCEDURE — 80061 LIPID PANEL: CPT | Mod: ,,, | Performed by: CLINICAL MEDICAL LABORATORY

## 2024-01-25 PROCEDURE — 1159F MED LIST DOCD IN RCRD: CPT | Mod: ,,, | Performed by: NURSE PRACTITIONER

## 2024-01-25 PROCEDURE — 1160F RVW MEDS BY RX/DR IN RCRD: CPT | Mod: ,,, | Performed by: NURSE PRACTITIONER

## 2024-01-25 PROCEDURE — 3078F DIAST BP <80 MM HG: CPT | Mod: ,,, | Performed by: NURSE PRACTITIONER

## 2024-01-25 RX ORDER — PRAMIPEXOLE DIHYDROCHLORIDE 0.12 MG/1
0.25 TABLET ORAL NIGHTLY
COMMUNITY
Start: 2024-01-11 | End: 2024-06-14 | Stop reason: SDUPTHER

## 2024-01-25 RX ORDER — DEXAMETHASONE SODIUM PHOSPHATE 4 MG/ML
4 INJECTION, SOLUTION INTRA-ARTICULAR; INTRALESIONAL; INTRAMUSCULAR; INTRAVENOUS; SOFT TISSUE
Status: COMPLETED | OUTPATIENT
Start: 2024-01-25 | End: 2024-01-25

## 2024-01-25 RX ORDER — DENOSUMAB 60 MG/ML
INJECTION SUBCUTANEOUS
COMMUNITY

## 2024-01-25 RX ORDER — CELECOXIB 200 MG/1
200 CAPSULE ORAL DAILY
COMMUNITY
End: 2024-04-25 | Stop reason: SDUPTHER

## 2024-01-25 RX ORDER — SOY PROTEIN
POWDER (GRAM) ORAL
COMMUNITY

## 2024-01-25 RX ORDER — METHYLPREDNISOLONE ACETATE 40 MG/ML
40 INJECTION, SUSPENSION INTRA-ARTICULAR; INTRALESIONAL; INTRAMUSCULAR; SOFT TISSUE
Status: COMPLETED | OUTPATIENT
Start: 2024-01-25 | End: 2024-01-25

## 2024-01-25 RX ORDER — PROPRANOLOL HYDROCHLORIDE 40 MG/1
40 TABLET ORAL 2 TIMES DAILY
COMMUNITY

## 2024-01-25 RX ADMIN — METHYLPREDNISOLONE ACETATE 40 MG: 40 INJECTION, SUSPENSION INTRA-ARTICULAR; INTRALESIONAL; INTRAMUSCULAR; SOFT TISSUE at 09:01

## 2024-01-25 RX ADMIN — DEXAMETHASONE SODIUM PHOSPHATE 4 MG: 4 INJECTION, SOLUTION INTRA-ARTICULAR; INTRALESIONAL; INTRAMUSCULAR; INTRAVENOUS; SOFT TISSUE at 09:01

## 2024-01-25 NOTE — PROGRESS NOTES
KEDAR Diaz   [facility]  [address]  [place] [address]     PATIENT NAME: Felicia Keita  : 1945  DATE: 24  MRN: [id number]      Billing Provider: KEDAR Diaz  Level of Service:   Patient PCP Information       Provider PCP Type    KEDAR Chauhan General            Reason for Visit / Chief Complaint: Back Pain (Pt needs something for pain for her back and legs.)   Health Maintenance Due   Topic Date Due    COVID-19 Vaccine (1) Never done    TETANUS VACCINE  Never done    Shingles Vaccine (1 of 2) Never done    RSV Vaccine (Age 60+ and Pregnant patients) (1 - 1-dose 60+ series) Never done    Influenza Vaccine (1) 2023          History of Present Illness / Problem Focused Workflow     Felicia Keita presents to the clinic with back/leg pain radiates down both legs but right leg is worse. She does have numbness/tingling to both legs. She currently sees pain management for this but states she gets a steroid shot occasionally that does help. She has had 3 back surgeries. Pt had CVA last September. She states this was a very light stroke and does not have any residual symptoms from this. She has not had any labs drawn since hospital stay. She denies any problems with urinating/bm. 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 She denies any other needs at this time. NAD noted.     Hemoglobin A1C   Date Value Ref Range Status   2023 5.5 4.5 - 6.6 % Final     Comment:       Normal:               <5.7%  Pre-Diabetic:       5.7% to 6.4%  Diabetic:             >6.4%  Diabetic Goal:     <7%   2022 5.6 4.5 - 6.6 % Final     Comment:       Normal:               <5.7%  Pre-Diabetic:       5.7% to 6.4%  Diabetic:             >6.4%  Diabetic Goal:     <7%        CMP  Sodium   Date Value Ref Range Status   2024 135 (L) 136 - 145 mmol/L Final     Potassium   Date Value Ref Range Status   2024 3.6 3.5 - 5.1 mmol/L Final      Chloride   Date Value Ref Range Status   01/25/2024 95 (L) 98 - 107 mmol/L Final     CO2   Date Value Ref Range Status   01/25/2024 30 21 - 32 mmol/L Final     Glucose   Date Value Ref Range Status   01/25/2024 93 74 - 106 mg/dL Final     BUN   Date Value Ref Range Status   01/25/2024 13 7 - 18 mg/dL Final     Creatinine   Date Value Ref Range Status   01/25/2024 0.96 0.55 - 1.02 mg/dL Final     Calcium   Date Value Ref Range Status   01/25/2024 9.8 8.5 - 10.1 mg/dL Final     Total Protein   Date Value Ref Range Status   01/25/2024 7.2 6.4 - 8.2 g/dL Final     Albumin   Date Value Ref Range Status   01/25/2024 4.0 3.5 - 5.0 g/dL Final     Bilirubin, Total   Date Value Ref Range Status   01/25/2024 0.3 >0.0 - 1.2 mg/dL Final     Alk Phos   Date Value Ref Range Status   01/25/2024 97 55 - 142 U/L Final     AST   Date Value Ref Range Status   01/25/2024 26 15 - 37 U/L Final     ALT   Date Value Ref Range Status   01/25/2024 24 13 - 56 U/L Final     Anion Gap   Date Value Ref Range Status   01/25/2024 14 7 - 16 mmol/L Final     eGFR   Date Value Ref Range Status   01/25/2024 60 >=60 mL/min/1.73m2 Final        Lab Results   Component Value Date    WBC 5.11 01/25/2024    RBC 4.51 01/25/2024    HGB 12.9 01/25/2024    HCT 39.8 01/25/2024    MCV 88.2 01/25/2024    MCH 28.6 01/25/2024    MCHC 32.4 01/25/2024    RDW 14.1 01/25/2024     01/25/2024    MPV 12.0 01/25/2024    LYMPH 30.9 01/25/2024    LYMPH 1.58 01/25/2024    MONO 9.2 (H) 01/25/2024    EOS 0.16 01/25/2024    BASO 0.08 01/25/2024    EOSINOPHIL 3.1 01/25/2024    BASOPHIL 1.6 (H) 01/25/2024        Lab Results   Component Value Date    CHOL 171 01/25/2024    CHOL 165 09/26/2023    CHOL 169 07/20/2022     Lab Results   Component Value Date    HDL 88 (H) 01/25/2024    HDL 82 (H) 09/26/2023    HDL 91 (H) 07/20/2022     Lab Results   Component Value Date    LDLCALC 72 01/25/2024    LDLCALC 74 09/26/2023    LDLCALC 66 07/20/2022     Lab Results   Component Value  Date    TRIG 57 01/25/2024    TRIG 44 09/26/2023    TRIG 58 07/20/2022     Lab Results   Component Value Date    CHOLHDL 1.9 01/25/2024    CHOLHDL 2.0 09/26/2023    CHOLHDL 1.9 07/20/2022        Wt Readings from Last 3 Encounters:   01/25/24 0913 85.3 kg (188 lb)   12/04/23 1042 87.1 kg (192 lb)   10/17/23 1013 88.2 kg (194 lb 8 oz)        BP Readings from Last 3 Encounters:   01/25/24 (!) 160/79   12/04/23 (!) 165/57   10/17/23 (!) 167/77        Review of Systems     Review of Systems   Constitutional: Negative.    Respiratory: Negative.     Cardiovascular: Negative.    Gastrointestinal: Negative.    Endocrine: Negative.    Genitourinary: Negative.    Musculoskeletal:  Positive for arthralgias, back pain and leg pain.   Integumentary:  Negative.   Allergic/Immunologic: Negative.    Neurological:  Positive for numbness.   Hematological: Negative.    Psychiatric/Behavioral: Negative.          Medical / Social / Family History     Past Medical History:   Diagnosis Date    Abnormal gait 05/22/2013    Actinic keratosis 08/12/2020    L forearm     Acute cough 03/30/2023    Allergic rhinitis 04/07/2020    Blepharophimosis 04/22/2014    senile    Cervical somatic dysfunction 08/15/2017    Cervicalgia 03/28/2019    Chronic peripheral venous hypertension 06/16/2015    Chronic serous otitis media, bilateral 09/05/2019    Chronic venous hypertension (idiopathic) without complications of unspecified lower extremity 08/15/2017    Conjunctival hemorrhage, right eye 11/02/2020    Deep venous thrombosis of lower extremity 09/17/2012    Degeneration of cervical intervertebral disc 10/21/2011    Degeneration of lumbar intervertebral disc 10/21/2011    Degenerative joint disease involving multiple joints 07/25/2011    Depressive disorder 07/25/2011    Essential hypertension 08/12/2020    Essential tremor 06/24/2014    GERD without esophagitis 08/15/2017    Headache 04/07/2020    Hyperlipidemia 05/08/2012    Insomnia 04/06/2016     Osteoarthritis 01/04/2017    Osteoporosis 11/07/2017    Otalgia, right ear 10/17/2016    Other cervical disc degeneration, unspecified cervical region 08/15/2017    Overflow incontinence 01/06/2020    Pain in right knee 01/06/2020    osteoarthritis    Pain in right leg 09/10/2020    Peripheral arterial occlusive disease 02/24/2015    Peripheral vascular disease, unspecified 08/06/2019    Peripheral venous insufficiency 09/29/2015    Personal history of PE (pulmonary embolism) 08/06/2019    Presence of vena cava filter 03/2015    Pulmonary embolus 03/10/2015    Pulmonary infarction 03/16/2012    Pure hypercholesterolemia 07/25/2011    Restless legs 05/22/2018    Right temporomandibular joint disorder, unspecified 08/18/2020    Rosacea 07/01/2015    Sciatica, right side 07/06/2016    Seborrheic keratosis 01/07/2021    Segmental and somatic dysfunction of thoracic region 03/28/2019    Senile osteoporosis 04/24/2017    Spasm of back muscles 07/25/2011    Stroke 09/25/2023    Trochanteric bursitis of right hip 01/06/2020    Unspecified urinary incontinence 02/07/2020    Vitamin D deficiency 10/27/2014       Past Surgical History:   Procedure Laterality Date    APPENDECTOMY      bone density  06/27/2019    Ajith/Abiel    BREAST SURGERY Left 2013    approximate    CARPAL TUNNEL RELEASE      BLI    LUMBAR LAMINECTOMY      prolia  03/12/2019    1mg    remove cataract Right     insert lens    TONSILLECTOMY      TOTAL ABDOMINAL HYSTERECTOMY      VAGINAL DELIVERY      vascular surgery procedure       Right SSV Laser Ablation performed by Dr. Carlo hernandez screen  05/24/2018       Social History  Ms.  reports that she has never smoked. She has never been exposed to tobacco smoke. She has never used smokeless tobacco. She reports that she does not drink alcohol and does not use drugs.    Family History  Ms.'s family history includes COPD in her daughter and sister; Cancer in her brother; Emphysema in her father; Hearing loss  in her father; Hypertension in her father and sister; Melanoma in her brother; Rheum arthritis in her daughter and sister; Skin cancer in her father; Stomach cancer in her sister.    Medications and Allergies     Medications  Outpatient Medications Marked as Taking for the 1/25/24 encounter (Office Visit) with Demi Harris FNP   Medication Sig Dispense Refill    albuterol (PROVENTIL/VENTOLIN HFA) 90 mcg/actuation inhaler Inhale 1-2 puffs into the lungs every 4 (four) hours as needed for Wheezing. Rescue 18 g 1    albuterol-ipratropium (DUO-NEB) 2.5 mg-0.5 mg/3 mL nebulizer solution Take 3 mLs by nebulization every 6 (six) hours as needed for Wheezing. Rescue 100 mL 2    amLODIPine (NORVASC) 5 MG tablet Take 1 tablet (5 mg total) by mouth 2 (two) times daily. 180 tablet 1    ascorbic acid, vitamin C, (VITAMIN C) 500 MG tablet Take 500 mg by mouth once daily.      atorvastatin (LIPITOR) 80 MG tablet Take 1 tablet (80 mg total) by mouth once daily. 30 tablet 0    calcium-vitamin D tablet 600 mg-200 units Take 1 tablet by mouth 2 (two) times a day.      celecoxib (CELEBREX) 200 MG capsule Take 200 mg by mouth once daily.      cetirizine (ZYRTEC) 10 MG tablet Take 1 tablet (10 mg total) by mouth once daily. 30 tablet 0    cyanocobalamin/folic acid (VITAMIN B12-FOLIC ACID) 500-400 mcg Tab take 1 tablet by oral route daily Oral 1      denosumab (PROLIA) 60 mg/mL Syrg as directed Subcutaneous      desvenlafaxine succinate (PRISTIQ) 100 MG Tb24 Take 1 tablet (100 mg total) by mouth once daily. 90 tablet 1    diaper,brief,adult,disposable Misc 1 each by Misc.(Non-Drug; Combo Route) route continuous prn (prn). 120 each 5    diclofenac sodium (VOLTAREN) 1 % Gel Apply 2 g topically once daily. 2 g 3    furosemide (LASIX) 20 MG tablet Take 1 tablet (20 mg total) by mouth once daily. 90 tablet 1    gabapentin (NEURONTIN) 300 MG capsule TAKE 1 CAPSULE BY MOUTH IN THE MORNING, TAKE 1 CAPSULE at LUNCH, AND TAKE 2 CAPSULES AT BEDTIME  380 capsule 1    HYDROcodone-acetaminophen (NORCO)  mg per tablet 05/27/21 TAKE 1 TABLET BY MOUTH EVERY 8 HOURS AS NEEDED      ipratropium (ATROVENT) 42 mcg (0.06 %) nasal spray 2 sprays by Each Nostril route 4 (four) times daily. 15 mL 0    losartan (COZAAR) 100 MG tablet Take 1 tablet (100 mg total) by mouth once daily. 90 tablet 1    omeprazole (PRILOSEC) 20 MG capsule Take 1 capsule (20 mg total) by mouth once daily. 90 capsule 1    pramipexole (MIRAPEX) 0.125 MG tablet Take 0.25 mg by mouth every evening.      pregabalin (LYRICA) 25 MG capsule Take 1 capsule (25 mg total) by mouth 2 (two) times daily. 180 capsule 0    primidone (MYSOLINE) 50 MG Tab TAKE 2 TABLETS BY MOUTH EVERY MORNING AND TAKE 3 TABLETS BY MOUTH EVERY EVENING 450 tablet 1    propranoloL (INDERAL) 40 MG tablet Take 40 mg by mouth 2 (two) times daily.      rivaroxaban (XARELTO) 20 mg Tab Take 1 tablet (20 mg total) by mouth once daily. 90 tablet 1    senna-docusate 8.6-50 mg (PERICOLACE) 8.6-50 mg per tablet Take 1 tablet by mouth once daily.      tiZANidine (ZANAFLEX) 4 MG tablet Take 1 tablet (4 mg total) by mouth 2 (two) times daily as needed (muscle spasms). 60 tablet 2    zinc gluconate 50 mg tablet Take 50 mg by mouth once daily.       Current Facility-Administered Medications for the 1/25/24 encounter (Office Visit) with Demi Harris FNP   Medication Dose Route Frequency Provider Last Rate Last Admin    [COMPLETED] dexAMETHasone injection 4 mg  4 mg Intramuscular 1 time in Clinic/HOD Demi Harris FNP   4 mg at 01/25/24 0952    [COMPLETED] methylPREDNISolone acetate injection 40 mg  40 mg Intramuscular 1 time in Clinic/HOD Demi Harris FNP   40 mg at 01/25/24 0952       Allergies  Review of patient's allergies indicates:  No Known Allergies    Physical Examination     Vitals:    01/25/24 0913   BP: (!) 160/79   BP Location: Left arm   Patient Position: Sitting   BP Method: Medium (Automatic)   Pulse: 60   Resp: 19   Temp: 98.2 °F  (36.8 °C)   TempSrc: Oral   SpO2: 98%   Weight: 85.3 kg (188 lb)      Physical Exam  Constitutional:       Appearance: Normal appearance.   HENT:      Head: Normocephalic.   Eyes:      Extraocular Movements: Extraocular movements intact.   Cardiovascular:      Rate and Rhythm: Normal rate and regular rhythm.      Pulses: Normal pulses.      Heart sounds: Normal heart sounds.   Pulmonary:      Effort: Pulmonary effort is normal.      Breath sounds: Normal breath sounds.   Musculoskeletal:         General: Normal range of motion.      Cervical back: Normal range of motion.      Lumbar back: Spasms and tenderness present. No swelling, edema, deformity, signs of trauma, lacerations or bony tenderness. Normal range of motion. No scoliosis.   Skin:     General: Skin is warm and dry.      Capillary Refill: Capillary refill takes less than 2 seconds.   Neurological:      General: No focal deficit present.      Mental Status: She is alert and oriented to person, place, and time.   Psychiatric:         Mood and Affect: Mood normal.         Behavior: Behavior normal.          Assessment and Plan (including Health Maintenance)   :    Plan:     There are no Patient Instructions on file for this visit.       Health Maintenance Due   Topic Date Due    COVID-19 Vaccine (1) Never done    TETANUS VACCINE  Never done    Shingles Vaccine (1 of 2) Never done    RSV Vaccine (Age 60+ and Pregnant patients) (1 - 1-dose 60+ series) Never done    Influenza Vaccine (1) 09/01/2023       Problem List Items Addressed This Visit       Back pain - Primary    Current Assessment & Plan     Decadron 4mg/Depo Medrol 40mg injections given. No adverse reaction noted.   Pt followed by Pain management.   Instructed to RTC for any new/worsening/persisting ssx.           Relevant Medications    dexAMETHasone injection 4 mg (Completed)    methylPREDNISolone acetate injection 40 mg (Completed)    Hyperlipidemia    Current Assessment & Plan     Labs pending.  Will inform of results when available.          Relevant Orders    Lipid Panel (Completed)    Uncontrolled hypertension    Current Assessment & Plan     Labs pending. Will inform of results when available.      1) Check your blood pressure at home. Please notify me at the clinic if the systolic (top number) is consistently over 140 or under 110 or if the diastolic (bottom number) is consistently over 90 or under 60.  2) Regular aerobic physical activity ( e.g. brisk walking) at least 30 min 5 out of 7 days of the week  3) Low sodium diet.  4) Take your meds as directed  5) To the ER for any signs of chest pain/tightness/palpitations/shortness of breath/difficulty speaking/numbness or tingling in face or extremities  6) Have yearly eye exams          Relevant Orders    POCT URINALYSIS W/O SCOPE (Completed)    CBC Auto Differential (Completed)    Comprehensive Metabolic Panel (Completed)    Urine WBC increased    Current Assessment & Plan     Urine culture pending. Will inform of results when available.          Relevant Orders    Urine culture (Completed)     Other Visit Diagnoses       Hypermagnesemia        Relevant Orders    Magnesium (Completed)          Chronic bilateral low back pain with bilateral sciatica  -     dexAMETHasone injection 4 mg  -     methylPREDNISolone acetate injection 40 mg    Mixed hyperlipidemia  -     Lipid Panel; Future; Expected date: 01/25/2024    Uncontrolled hypertension  -     POCT URINALYSIS W/O SCOPE  -     CBC Auto Differential; Future; Expected date: 01/25/2024  -     Comprehensive Metabolic Panel; Future; Expected date: 01/25/2024    Hypermagnesemia  -     Magnesium; Future; Expected date: 01/25/2024    Urine WBC increased  -     Cancel: Urine culture  -     Urine culture       Health Maintenance Topics with due status: Not Due       Topic Last Completion Date    DEXA Scan 07/27/2023    Lipid Panel 01/25/2024         Future Appointments   Date Time Provider Department Center    2/21/2024 10:00 AM AWV NURSE, [facility]   4/25/2024 10:40 AM Demi Harris FNP [facility]   6/11/2024 10:00 AM INFUSION, [facility] [place] [facility]        Follow up in about 3 months (around 4/25/2024), or if symptoms worsen or fail to improve.    Health Maintenance Due   Topic Date Due    COVID-19 Vaccine (1) Never done    TETANUS VACCINE  Never done    Shingles Vaccine (1 of 2) Never done    RSV Vaccine (Age 60+ and Pregnant patients) (1 - 1-dose 60+ series) Never done    Influenza Vaccine (1) 09/01/2023        Signature:  KEDAR Diaz    Date of encounter: 1/25/24

## 2024-01-26 PROBLEM — R05.1 ACUTE COUGH: Status: RESOLVED | Noted: 2023-03-30 | Resolved: 2024-01-26

## 2024-01-26 PROBLEM — R82.998 URINE WBC INCREASED: Status: ACTIVE | Noted: 2024-01-26

## 2024-01-26 NOTE — ASSESSMENT & PLAN NOTE
Decadron 4mg/Depo Medrol 40mg injections given. No adverse reaction noted.   Pt followed by Pain management.   Instructed to RTC for any new/worsening/persisting ssx.

## 2024-01-26 NOTE — ASSESSMENT & PLAN NOTE
Labs pending. Will inform of results when available.      1) Check your blood pressure at home. Please notify me at the clinic if the systolic (top number) is consistently over 140 or under 110 or if the diastolic (bottom number) is consistently over 90 or under 60.  2) Regular aerobic physical activity ( e.g. brisk walking) at least 30 min 5 out of 7 days of the week  3) Low sodium diet.  4) Take your meds as directed  5) To the ER for any signs of chest pain/tightness/palpitations/shortness of breath/difficulty speaking/numbness or tingling in face or extremities  6) Have yearly eye exams

## 2024-01-26 NOTE — PROGRESS NOTES
Please let pt know her labs look really good! I did add a urine culture to her urine just because she had a small amount of bacteria but she said she was not having symptoms yesterday so we will just let her know if the culture grows any bacteria and this may be back by Monday. Thanks!

## 2024-01-28 DIAGNOSIS — N39.0 URINARY TRACT INFECTION WITHOUT HEMATURIA, SITE UNSPECIFIED: Primary | ICD-10-CM

## 2024-01-28 LAB
UA COMPLETE W REFLEX CULTURE PNL UR: ABNORMAL
UA COMPLETE W REFLEX CULTURE PNL UR: ABNORMAL

## 2024-01-28 RX ORDER — NITROFURANTOIN 25; 75 MG/1; MG/1
100 CAPSULE ORAL 2 TIMES DAILY
Qty: 10 CAPSULE | Refills: 0 | Status: SHIPPED | OUTPATIENT
Start: 2024-01-28 | End: 2024-02-02

## 2024-01-28 NOTE — PROGRESS NOTES
Please let her know her urine culture grew 2 different bacteria so she does have a UTI which may be contributing to her back pain. I have sent in macrobid twice daily for 5 days which was sensitive to both bacteria. Thanks!

## 2024-02-08 RX ORDER — ATORVASTATIN CALCIUM 80 MG/1
80 TABLET, FILM COATED ORAL DAILY
Qty: 90 TABLET | Refills: 1 | Status: SHIPPED | OUTPATIENT
Start: 2024-02-08 | End: 2024-04-25 | Stop reason: SDUPTHER

## 2024-02-09 DIAGNOSIS — Z71.89 COMPLEX CARE COORDINATION: ICD-10-CM

## 2024-02-12 DIAGNOSIS — H65.23 BILATERAL CHRONIC SEROUS OTITIS MEDIA: ICD-10-CM

## 2024-02-12 DIAGNOSIS — M16.11 PRIMARY OSTEOARTHRITIS OF RIGHT HIP: ICD-10-CM

## 2024-02-12 DIAGNOSIS — J44.9 CHRONIC OBSTRUCTIVE PULMONARY DISEASE, UNSPECIFIED COPD TYPE: ICD-10-CM

## 2024-02-12 RX ORDER — IPRATROPIUM BROMIDE 42 UG/1
2 SPRAY, METERED NASAL 4 TIMES DAILY
Qty: 15 ML | Refills: 0 | Status: SHIPPED | OUTPATIENT
Start: 2024-02-12 | End: 2024-03-07 | Stop reason: SDUPTHER

## 2024-02-16 DIAGNOSIS — M16.0 PRIMARY OSTEOARTHRITIS OF BOTH HIPS: ICD-10-CM

## 2024-02-16 DIAGNOSIS — J44.9 CHRONIC OBSTRUCTIVE PULMONARY DISEASE, UNSPECIFIED COPD TYPE: ICD-10-CM

## 2024-02-16 RX ORDER — ALBUTEROL SULFATE 90 UG/1
1-2 AEROSOL, METERED RESPIRATORY (INHALATION) EVERY 4 HOURS PRN
Qty: 18 G | Refills: 3 | Status: SHIPPED | OUTPATIENT
Start: 2024-02-16 | End: 2024-04-03 | Stop reason: SDUPTHER

## 2024-02-16 RX ORDER — DICLOFENAC SODIUM 10 MG/G
2 GEL TOPICAL DAILY
Qty: 2 G | Refills: 3 | Status: SHIPPED | OUTPATIENT
Start: 2024-02-16

## 2024-02-21 DIAGNOSIS — H65.23 BILATERAL CHRONIC SEROUS OTITIS MEDIA: ICD-10-CM

## 2024-02-21 DIAGNOSIS — J44.9 CHRONIC OBSTRUCTIVE PULMONARY DISEASE, UNSPECIFIED COPD TYPE: ICD-10-CM

## 2024-02-21 DIAGNOSIS — M16.11 PRIMARY OSTEOARTHRITIS OF RIGHT HIP: ICD-10-CM

## 2024-03-07 RX ORDER — IPRATROPIUM BROMIDE AND ALBUTEROL SULFATE 2.5; .5 MG/3ML; MG/3ML
3 SOLUTION RESPIRATORY (INHALATION) EVERY 6 HOURS PRN
Qty: 100 ML | Refills: 2 | Status: SHIPPED | OUTPATIENT
Start: 2024-03-07 | End: 2024-04-03 | Stop reason: SDUPTHER

## 2024-03-07 RX ORDER — IPRATROPIUM BROMIDE 42 UG/1
2 SPRAY, METERED NASAL 4 TIMES DAILY
Qty: 15 ML | Refills: 0 | Status: SHIPPED | OUTPATIENT
Start: 2024-03-07 | End: 2024-04-03 | Stop reason: SDUPTHER

## 2024-04-03 DIAGNOSIS — M16.11 PRIMARY OSTEOARTHRITIS OF RIGHT HIP: ICD-10-CM

## 2024-04-03 DIAGNOSIS — H65.23 BILATERAL CHRONIC SEROUS OTITIS MEDIA: ICD-10-CM

## 2024-04-03 DIAGNOSIS — J44.9 CHRONIC OBSTRUCTIVE PULMONARY DISEASE, UNSPECIFIED COPD TYPE: ICD-10-CM

## 2024-04-03 RX ORDER — IPRATROPIUM BROMIDE AND ALBUTEROL SULFATE 2.5; .5 MG/3ML; MG/3ML
3 SOLUTION RESPIRATORY (INHALATION) EVERY 6 HOURS PRN
Qty: 100 ML | Refills: 2 | Status: SHIPPED | OUTPATIENT
Start: 2024-04-03 | End: 2024-05-14 | Stop reason: SDUPTHER

## 2024-04-03 RX ORDER — ALBUTEROL SULFATE 90 UG/1
1-2 AEROSOL, METERED RESPIRATORY (INHALATION) EVERY 4 HOURS PRN
Qty: 18 G | Refills: 3 | Status: SHIPPED | OUTPATIENT
Start: 2024-04-03 | End: 2024-06-11 | Stop reason: SDUPTHER

## 2024-04-03 RX ORDER — IPRATROPIUM BROMIDE 42 UG/1
2 SPRAY, METERED NASAL 4 TIMES DAILY
Qty: 15 ML | Refills: 0 | Status: SHIPPED | OUTPATIENT
Start: 2024-04-03 | End: 2024-05-14 | Stop reason: SDUPTHER

## 2024-04-11 DIAGNOSIS — G89.29 CHRONIC LOW BACK PAIN WITH LEFT-SIDED SCIATICA, UNSPECIFIED BACK PAIN LATERALITY: ICD-10-CM

## 2024-04-11 DIAGNOSIS — M54.42 CHRONIC LOW BACK PAIN WITH LEFT-SIDED SCIATICA, UNSPECIFIED BACK PAIN LATERALITY: ICD-10-CM

## 2024-04-11 RX ORDER — PREGABALIN 25 MG/1
25 CAPSULE ORAL 2 TIMES DAILY
Qty: 180 CAPSULE | Refills: 0 | Status: SHIPPED | OUTPATIENT
Start: 2024-04-11 | End: 2024-07-10

## 2024-04-25 ENCOUNTER — OFFICE VISIT (OUTPATIENT)
Dept: FAMILY MEDICINE | Facility: CLINIC | Age: 79
End: 2024-04-25
Payer: MEDICARE

## 2024-04-25 VITALS
TEMPERATURE: 98 F | WEIGHT: 194.81 LBS | HEART RATE: 57 BPM | HEIGHT: 67 IN | DIASTOLIC BLOOD PRESSURE: 80 MMHG | SYSTOLIC BLOOD PRESSURE: 149 MMHG | BODY MASS INDEX: 30.57 KG/M2 | RESPIRATION RATE: 19 BRPM | OXYGEN SATURATION: 98 %

## 2024-04-25 DIAGNOSIS — M54.41 CHRONIC MIDLINE LOW BACK PAIN WITH RIGHT-SIDED SCIATICA: ICD-10-CM

## 2024-04-25 DIAGNOSIS — I10 HYPERTENSION, UNSPECIFIED TYPE: Primary | ICD-10-CM

## 2024-04-25 DIAGNOSIS — M16.11 PRIMARY OSTEOARTHRITIS OF RIGHT HIP: ICD-10-CM

## 2024-04-25 DIAGNOSIS — E78.2 MIXED HYPERLIPIDEMIA: ICD-10-CM

## 2024-04-25 DIAGNOSIS — M79.641 BILATERAL HAND PAIN: ICD-10-CM

## 2024-04-25 DIAGNOSIS — M79.642 BILATERAL HAND PAIN: ICD-10-CM

## 2024-04-25 DIAGNOSIS — I82.409 RECURRENT DEEP VEIN THROMBOSIS (DVT): ICD-10-CM

## 2024-04-25 DIAGNOSIS — F33.0 MILD EPISODE OF RECURRENT MAJOR DEPRESSIVE DISORDER: ICD-10-CM

## 2024-04-25 DIAGNOSIS — I82.592 CHRONIC DEEP VEIN THROMBOSIS (DVT) OF OTHER VEIN OF LEFT LOWER EXTREMITY: ICD-10-CM

## 2024-04-25 DIAGNOSIS — I69.351 HEMIPLEGIA AND HEMIPARESIS FOLLOWING CEREBRAL INFARCTION AFFECTING RIGHT DOMINANT SIDE: ICD-10-CM

## 2024-04-25 DIAGNOSIS — J44.9 CHRONIC OBSTRUCTIVE PULMONARY DISEASE, UNSPECIFIED COPD TYPE: ICD-10-CM

## 2024-04-25 DIAGNOSIS — G89.29 CHRONIC MIDLINE LOW BACK PAIN WITH RIGHT-SIDED SCIATICA: ICD-10-CM

## 2024-04-25 LAB
ALBUMIN SERPL BCP-MCNC: 3.8 G/DL (ref 3.5–5)
ALBUMIN/GLOB SERPL: 1.1 {RATIO}
ALP SERPL-CCNC: 99 U/L (ref 55–142)
ALT SERPL W P-5'-P-CCNC: 31 U/L (ref 13–56)
ANION GAP SERPL CALCULATED.3IONS-SCNC: 13 MMOL/L (ref 7–16)
AST SERPL W P-5'-P-CCNC: 32 U/L (ref 15–37)
BASOPHILS # BLD AUTO: 0.07 K/UL (ref 0–0.2)
BASOPHILS NFR BLD AUTO: 1.3 % (ref 0–1)
BILIRUB SERPL-MCNC: 0.3 MG/DL (ref ?–1.2)
BUN SERPL-MCNC: 12 MG/DL (ref 7–18)
BUN/CREAT SERPL: 13 (ref 6–20)
CALCIUM SERPL-MCNC: 9.5 MG/DL (ref 8.5–10.1)
CHLORIDE SERPL-SCNC: 98 MMOL/L (ref 98–107)
CHOLEST SERPL-MCNC: 168 MG/DL (ref 0–200)
CHOLEST/HDLC SERPL: 1.9 {RATIO}
CO2 SERPL-SCNC: 29 MMOL/L (ref 21–32)
CREAT SERPL-MCNC: 0.95 MG/DL (ref 0.55–1.02)
DIFFERENTIAL METHOD BLD: ABNORMAL
EGFR (NO RACE VARIABLE) (RUSH/TITUS): 61 ML/MIN/1.73M2
EOSINOPHIL # BLD AUTO: 0.18 K/UL (ref 0–0.5)
EOSINOPHIL NFR BLD AUTO: 3.2 % (ref 1–4)
ERYTHROCYTE [DISTWIDTH] IN BLOOD BY AUTOMATED COUNT: 14.7 % (ref 11.5–14.5)
GLOBULIN SER-MCNC: 3.4 G/DL (ref 2–4)
GLUCOSE SERPL-MCNC: 98 MG/DL (ref 74–106)
HCT VFR BLD AUTO: 40.9 % (ref 38–47)
HDLC SERPL-MCNC: 88 MG/DL (ref 40–60)
HGB BLD-MCNC: 12.3 G/DL (ref 12–16)
IMM GRANULOCYTES # BLD AUTO: 0.01 K/UL (ref 0–0.04)
IMM GRANULOCYTES NFR BLD: 0.2 % (ref 0–0.4)
LDLC SERPL CALC-MCNC: 67 MG/DL
LYMPHOCYTES # BLD AUTO: 1.71 K/UL (ref 1–4.8)
LYMPHOCYTES NFR BLD AUTO: 30.9 % (ref 27–41)
MCH RBC QN AUTO: 27 PG (ref 27–31)
MCHC RBC AUTO-ENTMCNC: 30.1 G/DL (ref 32–36)
MCV RBC AUTO: 89.9 FL (ref 80–96)
MONOCYTES # BLD AUTO: 0.55 K/UL (ref 0–0.8)
MONOCYTES NFR BLD AUTO: 9.9 % (ref 2–6)
MPC BLD CALC-MCNC: 11.5 FL (ref 9.4–12.4)
NEUTROPHILS # BLD AUTO: 3.02 K/UL (ref 1.8–7.7)
NEUTROPHILS NFR BLD AUTO: 54.5 % (ref 53–65)
NONHDLC SERPL-MCNC: 80 MG/DL
NRBC # BLD AUTO: 0 X10E3/UL
NRBC, AUTO (.00): 0 %
PLATELET # BLD AUTO: 266 K/UL (ref 150–400)
POTASSIUM SERPL-SCNC: 3.9 MMOL/L (ref 3.5–5.1)
PROT SERPL-MCNC: 7.2 G/DL (ref 6.4–8.2)
RBC # BLD AUTO: 4.55 M/UL (ref 4.2–5.4)
SODIUM SERPL-SCNC: 136 MMOL/L (ref 136–145)
TRIGL SERPL-MCNC: 63 MG/DL (ref 35–150)
VLDLC SERPL-MCNC: 13 MG/DL
WBC # BLD AUTO: 5.54 K/UL (ref 4.5–11)

## 2024-04-25 PROCEDURE — 1101F PT FALLS ASSESS-DOCD LE1/YR: CPT | Mod: ,,, | Performed by: NURSE PRACTITIONER

## 2024-04-25 PROCEDURE — 99214 OFFICE O/P EST MOD 30 MIN: CPT | Mod: 25,,, | Performed by: NURSE PRACTITIONER

## 2024-04-25 PROCEDURE — 96372 THER/PROPH/DIAG INJ SC/IM: CPT | Mod: ,,, | Performed by: NURSE PRACTITIONER

## 2024-04-25 PROCEDURE — 80061 LIPID PANEL: CPT | Mod: ,,, | Performed by: CLINICAL MEDICAL LABORATORY

## 2024-04-25 PROCEDURE — 3288F FALL RISK ASSESSMENT DOCD: CPT | Mod: ,,, | Performed by: NURSE PRACTITIONER

## 2024-04-25 PROCEDURE — 3077F SYST BP >= 140 MM HG: CPT | Mod: ,,, | Performed by: NURSE PRACTITIONER

## 2024-04-25 PROCEDURE — 85025 COMPLETE CBC W/AUTO DIFF WBC: CPT | Mod: ,,, | Performed by: CLINICAL MEDICAL LABORATORY

## 2024-04-25 PROCEDURE — 1159F MED LIST DOCD IN RCRD: CPT | Mod: ,,, | Performed by: NURSE PRACTITIONER

## 2024-04-25 PROCEDURE — 1160F RVW MEDS BY RX/DR IN RCRD: CPT | Mod: ,,, | Performed by: NURSE PRACTITIONER

## 2024-04-25 PROCEDURE — 80053 COMPREHEN METABOLIC PANEL: CPT | Mod: ,,, | Performed by: CLINICAL MEDICAL LABORATORY

## 2024-04-25 PROCEDURE — 3079F DIAST BP 80-89 MM HG: CPT | Mod: ,,, | Performed by: NURSE PRACTITIONER

## 2024-04-25 RX ORDER — DEXAMETHASONE SODIUM PHOSPHATE 4 MG/ML
4 INJECTION, SOLUTION INTRA-ARTICULAR; INTRALESIONAL; INTRAMUSCULAR; INTRAVENOUS; SOFT TISSUE
Status: COMPLETED | OUTPATIENT
Start: 2024-04-25 | End: 2024-04-25

## 2024-04-25 RX ORDER — CELECOXIB 200 MG/1
200 CAPSULE ORAL DAILY
Qty: 90 CAPSULE | Refills: 1 | Status: SHIPPED | OUTPATIENT
Start: 2024-04-25

## 2024-04-25 RX ORDER — ATORVASTATIN CALCIUM 80 MG/1
80 TABLET, FILM COATED ORAL DAILY
Qty: 90 TABLET | Refills: 1 | Status: SHIPPED | OUTPATIENT
Start: 2024-04-25

## 2024-04-25 RX ORDER — AMLODIPINE BESYLATE 5 MG/1
5 TABLET ORAL 2 TIMES DAILY
Qty: 180 TABLET | Refills: 1 | Status: SHIPPED | OUTPATIENT
Start: 2024-04-25

## 2024-04-25 RX ORDER — METHYLPREDNISOLONE ACETATE 40 MG/ML
40 INJECTION, SUSPENSION INTRA-ARTICULAR; INTRALESIONAL; INTRAMUSCULAR; SOFT TISSUE
Status: COMPLETED | OUTPATIENT
Start: 2024-04-25 | End: 2024-04-25

## 2024-04-25 RX ORDER — DESVENLAFAXINE 100 MG/1
100 TABLET, EXTENDED RELEASE ORAL DAILY
Qty: 90 TABLET | Refills: 1 | Status: SHIPPED | OUTPATIENT
Start: 2024-04-25

## 2024-04-25 RX ADMIN — DEXAMETHASONE SODIUM PHOSPHATE 4 MG: 4 INJECTION, SOLUTION INTRA-ARTICULAR; INTRALESIONAL; INTRAMUSCULAR; INTRAVENOUS; SOFT TISSUE at 11:04

## 2024-04-25 RX ADMIN — METHYLPREDNISOLONE ACETATE 40 MG: 40 INJECTION, SUSPENSION INTRA-ARTICULAR; INTRALESIONAL; INTRAMUSCULAR; SOFT TISSUE at 11:04

## 2024-04-26 NOTE — PROGRESS NOTES
Please let her know her labs look good! Cholesterol is about the same. I would just tell her to watch diet in fried, fatty foods. Thanks!

## 2024-05-01 DIAGNOSIS — M79.642 BILATERAL HAND PAIN: Primary | ICD-10-CM

## 2024-05-01 DIAGNOSIS — M79.641 BILATERAL HAND PAIN: Primary | ICD-10-CM

## 2024-05-02 RX ORDER — IPRATROPIUM BROMIDE AND ALBUTEROL SULFATE 2.5; .5 MG/3ML; MG/3ML
3 SOLUTION RESPIRATORY (INHALATION) EVERY 6 HOURS PRN
Qty: 100 ML | Refills: 2 | Status: CANCELLED | OUTPATIENT
Start: 2024-05-02

## 2024-05-02 RX ORDER — IPRATROPIUM BROMIDE 42 UG/1
2 SPRAY, METERED NASAL 4 TIMES DAILY
Qty: 15 ML | Refills: 0 | Status: CANCELLED | OUTPATIENT
Start: 2024-05-02

## 2024-05-03 ENCOUNTER — OFFICE VISIT (OUTPATIENT)
Dept: ORTHOPEDICS | Facility: CLINIC | Age: 79
End: 2024-05-03
Payer: MEDICARE

## 2024-05-03 ENCOUNTER — HOSPITAL ENCOUNTER (OUTPATIENT)
Dept: RADIOLOGY | Facility: HOSPITAL | Age: 79
Discharge: HOME | End: 2024-05-03
Payer: MEDICARE

## 2024-05-03 DIAGNOSIS — M79.641 BILATERAL HAND PAIN: ICD-10-CM

## 2024-05-03 DIAGNOSIS — M79.642 BILATERAL HAND PAIN: ICD-10-CM

## 2024-05-03 DIAGNOSIS — M65.341 TRIGGER RING FINGER OF RIGHT HAND: Primary | ICD-10-CM

## 2024-05-03 DIAGNOSIS — M65.342 TRIGGER RING FINGER OF LEFT HAND: ICD-10-CM

## 2024-05-03 PROCEDURE — 99999PBSHW PR PBB SHADOW TECHNICAL ONLY FILED TO HB: Mod: JW,PBBFAC,,

## 2024-05-03 PROCEDURE — 99212 OFFICE O/P EST SF 10 MIN: CPT | Mod: PBBFAC,25

## 2024-05-03 PROCEDURE — 99213 OFFICE O/P EST LOW 20 MIN: CPT | Mod: S$PBB,25,,

## 2024-05-03 PROCEDURE — 20550 NJX 1 TENDON SHEATH/LIGAMENT: CPT | Mod: PBBFAC,LT

## 2024-05-03 PROCEDURE — 73130 X-RAY EXAM OF HAND: CPT | Mod: TC,50

## 2024-05-03 PROCEDURE — 73130 X-RAY EXAM OF HAND: CPT | Mod: 26,50,, | Performed by: RADIOLOGY

## 2024-05-03 RX ORDER — TRIAMCINOLONE ACETONIDE 40 MG/ML
40 INJECTION, SUSPENSION INTRA-ARTICULAR; INTRAMUSCULAR
Status: DISCONTINUED | OUTPATIENT
Start: 2024-05-03 | End: 2024-05-03 | Stop reason: HOSPADM

## 2024-05-03 RX ORDER — BUPIVACAINE HYDROCHLORIDE 2.5 MG/ML
1 INJECTION, SOLUTION EPIDURAL; INFILTRATION; INTRACAUDAL
Status: DISCONTINUED | OUTPATIENT
Start: 2024-05-03 | End: 2024-05-03 | Stop reason: HOSPADM

## 2024-05-03 RX ADMIN — BUPIVACAINE HYDROCHLORIDE 1 ML: 2.5 INJECTION, SOLUTION EPIDURAL; INFILTRATION; INTRACAUDAL at 10:05

## 2024-05-03 RX ADMIN — TRIAMCINOLONE ACETONIDE 40 MG: 40 INJECTION, SUSPENSION INTRA-ARTICULAR; INTRAMUSCULAR at 10:05

## 2024-05-03 NOTE — PROCEDURES
Tendon Sheath    Date/Time: 5/3/2024 10:40 AM    Performed by: Frances Morrison PA  Authorized by: Frances Morrison PA    Consent Done?:  Yes (Verbal)  Indications:  Pain  Location:  Ring finger  Site:  L ring flexor tendon sheath and R ring flexor tendon sheath  Needle size:  25 G  Medications:  1 mL BUPivacaine (PF) 0.25% (2.5 mg/ml) 0.25 % (2.5 mg/mL); 40 mg triamcinolone acetonide 40 mg/mL    Additional Comments: Injected bilateral ring fingers tendon sheath for trigger finger each with 0.5 cc bupivacaine and 0.5 cc of triamcinolone

## 2024-05-03 NOTE — PROGRESS NOTES
CC: No chief complaint on file.         Felicia Keita is a 79 y.o. female seen today for No chief complaint on file.  Patient seen today with complaints of locking of ring fingers on bilateral hands.  Patient states that she has had this problem for a proximally 2 years now.  Worsening over the past month.  Patient states that her daughter recently had a stroke and has to push her daughter in a wheelchair.  She states that her fingers lock and cause severe pain when trying to push the wheelchair.  She physically has to straighten her finger after a locks.  She denies any fall or injury to either hand.  No other complaints today.  Of note she is unable to take NSAIDs due to anticoagulation therapy.  She is tried Tylenol without much relief.        PAST MEDICAL HISTORY:   Past Medical History:   Diagnosis Date    Abnormal gait 05/22/2013    Actinic keratosis 08/12/2020    L forearm     Acute cough 03/30/2023    Allergic rhinitis 04/07/2020    Blepharophimosis 04/22/2014    senile    Cervical somatic dysfunction 08/15/2017    Cervicalgia 03/28/2019    Chronic peripheral venous hypertension 06/16/2015    Chronic serous otitis media, bilateral 09/05/2019    Chronic venous hypertension (idiopathic) without complications of unspecified lower extremity 08/15/2017    Conjunctival hemorrhage, right eye 11/02/2020    Deep venous thrombosis of lower extremity 09/17/2012    Degeneration of cervical intervertebral disc 10/21/2011    Degeneration of lumbar intervertebral disc 10/21/2011    Degenerative joint disease involving multiple joints 07/25/2011    Depressive disorder 07/25/2011    Essential hypertension 08/12/2020    Essential tremor 06/24/2014    GERD without esophagitis 08/15/2017    Headache 04/07/2020    Hyperlipidemia 05/08/2012    Insomnia 04/06/2016    Osteoarthritis 01/04/2017    Osteoporosis 11/07/2017    Otalgia, right ear 10/17/2016    Other cervical disc degeneration, unspecified cervical  region 08/15/2017    Overflow incontinence 01/06/2020    Pain in right knee 01/06/2020    osteoarthritis    Pain in right leg 09/10/2020    Peripheral arterial occlusive disease 02/24/2015    Peripheral vascular disease, unspecified 08/06/2019    Peripheral venous insufficiency 09/29/2015    Personal history of PE (pulmonary embolism) 08/06/2019    Presence of vena cava filter 03/2015    Pulmonary embolus 03/10/2015    Pulmonary infarction 03/16/2012    Pure hypercholesterolemia 07/25/2011    Restless legs 05/22/2018    Right temporomandibular joint disorder, unspecified 08/18/2020    Rosacea 07/01/2015    Sciatica, right side 07/06/2016    Seborrheic keratosis 01/07/2021    Segmental and somatic dysfunction of thoracic region 03/28/2019    Senile osteoporosis 04/24/2017    Spasm of back muscles 07/25/2011    Stroke 09/25/2023    Trochanteric bursitis of right hip 01/06/2020    Unspecified urinary incontinence 02/07/2020    Vitamin D deficiency 10/27/2014          PAST SURGICAL HISTORY:   Past Surgical History:   Procedure Laterality Date    APPENDECTOMY      bone density  06/27/2019    Ajith/Abiel    BREAST SURGERY Left 2013    approximate    CARPAL TUNNEL RELEASE      BLI    LUMBAR LAMINECTOMY      prolia  03/12/2019    1mg    remove cataract Right     insert lens    TONSILLECTOMY      TOTAL ABDOMINAL HYSTERECTOMY      VAGINAL DELIVERY      vascular surgery procedure       Right SSV Laser Ablation performed by Dr. Carlo hernandez screen  05/24/2018          ALLERGIES: Review of patient's allergies indicates:  No Known Allergies     MEDICATIONS :    Current Outpatient Medications:     albuterol (PROVENTIL/VENTOLIN HFA) 90 mcg/actuation inhaler, Inhale 1-2 puffs into the lungs every 4 (four) hours as needed for Wheezing. Rescue, Disp: 18 g, Rfl: 3    albuterol-ipratropium (DUO-NEB) 2.5 mg-0.5 mg/3 mL nebulizer solution, Take 3 mLs by nebulization every 6 (six) hours as needed for Wheezing. Rescue, Disp: 100 mL,  Rfl: 2    amLODIPine (NORVASC) 5 MG tablet, Take 1 tablet (5 mg total) by mouth 2 (two) times daily., Disp: 180 tablet, Rfl: 1    ascorbic acid, vitamin C, (VITAMIN C) 500 MG tablet, Take 500 mg by mouth once daily., Disp: , Rfl:     aspirin (ECOTRIN) 81 MG EC tablet, Take 1 tablet (81 mg total) by mouth once daily., Disp: 30 tablet, Rfl: 0    atorvastatin (LIPITOR) 80 MG tablet, Take 1 tablet (80 mg total) by mouth once daily., Disp: 90 tablet, Rfl: 1    calcium-vitamin D tablet 600 mg-200 units, Take 1 tablet by mouth 2 (two) times a day., Disp: , Rfl:     celecoxib (CELEBREX) 200 MG capsule, Take 1 capsule (200 mg total) by mouth once daily., Disp: 90 capsule, Rfl: 1    cetirizine (ZYRTEC) 10 MG tablet, Take 1 tablet (10 mg total) by mouth once daily., Disp: 30 tablet, Rfl: 0    cyanocobalamin/folic acid (VITAMIN B12-FOLIC ACID) 500-400 mcg Tab, take 1 tablet by oral route daily Oral 1, Disp: , Rfl:     denosumab (PROLIA) 60 mg/mL Syrg, as directed Subcutaneous, Disp: , Rfl:     desvenlafaxine succinate (PRISTIQ) 100 MG Tb24, Take 1 tablet (100 mg total) by mouth once daily., Disp: 90 tablet, Rfl: 1    diaper,brief,adult,disposable Misc, 1 each by Misc.(Non-Drug; Combo Route) route continuous prn (prn)., Disp: 120 each, Rfl: 5    diclofenac sodium (VOLTAREN) 1 % Gel, Apply 2 g topically once daily., Disp: 2 g, Rfl: 3    furosemide (LASIX) 20 MG tablet, Take 1 tablet (20 mg total) by mouth once daily., Disp: 90 tablet, Rfl: 1    gabapentin (NEURONTIN) 300 MG capsule, TAKE 1 CAPSULE BY MOUTH IN THE MORNING, TAKE 1 CAPSULE at LUNCH, AND TAKE 2 CAPSULES AT BEDTIME, Disp: 380 capsule, Rfl: 1    HYDROcodone-acetaminophen (NORCO)  mg per tablet, 05/27/21 TAKE 1 TABLET BY MOUTH EVERY 8 HOURS AS NEEDED, Disp: , Rfl:     ipratropium (ATROVENT) 42 mcg (0.06 %) nasal spray, 2 sprays by Each Nostril route 4 (four) times daily., Disp: 15 mL, Rfl: 0    losartan (COZAAR) 100 MG tablet, Take 1 tablet (100 mg total) by mouth  once daily., Disp: 90 tablet, Rfl: 1    omeprazole (PRILOSEC) 20 MG capsule, Take 1 capsule (20 mg total) by mouth once daily., Disp: 90 capsule, Rfl: 1    pramipexole (MIRAPEX) 0.125 MG tablet, Take 0.25 mg by mouth every evening., Disp: , Rfl:     pregabalin (LYRICA) 25 MG capsule, Take 1 capsule (25 mg total) by mouth 2 (two) times daily., Disp: 180 capsule, Rfl: 0    primidone (MYSOLINE) 50 MG Tab, TAKE 2 TABLETS BY MOUTH EVERY MORNING AND TAKE 3 TABLETS BY MOUTH EVERY EVENING, Disp: 450 tablet, Rfl: 1    propranoloL (INDERAL) 40 MG tablet, Take 40 mg by mouth 2 (two) times daily., Disp: , Rfl:     rivaroxaban (XARELTO) 20 mg Tab, Take 1 tablet (20 mg total) by mouth once daily., Disp: 90 tablet, Rfl: 1    senna-docusate 8.6-50 mg (PERICOLACE) 8.6-50 mg per tablet, Take 1 tablet by mouth once daily., Disp: , Rfl:     tiZANidine (ZANAFLEX) 4 MG tablet, Take 1 tablet (4 mg total) by mouth 2 (two) times daily as needed (muscle spasms)., Disp: 60 tablet, Rfl: 2    zinc gluconate 50 mg tablet, Take 50 mg by mouth once daily., Disp: , Rfl:      SOCIAL HISTORY:   Social History     Socioeconomic History    Marital status:    Tobacco Use    Smoking status: Never     Passive exposure: Never    Smokeless tobacco: Never   Substance and Sexual Activity    Alcohol use: Never    Drug use: Never    Sexual activity: Not Currently     Social Determinants of Health     Financial Resource Strain: Low Risk  (9/26/2023)    Overall Financial Resource Strain (CARDIA)     Difficulty of Paying Living Expenses: Not hard at all   Food Insecurity: No Food Insecurity (9/26/2023)    Hunger Vital Sign     Worried About Running Out of Food in the Last Year: Never true     Ran Out of Food in the Last Year: Never true   Transportation Needs: No Transportation Needs (9/26/2023)    PRAPARE - Transportation     Lack of Transportation (Medical): No     Lack of Transportation (Non-Medical): No   Physical Activity: Inactive (9/26/2023)     Exercise Vital Sign     Days of Exercise per Week: 0 days     Minutes of Exercise per Session: 0 min   Stress: No Stress Concern Present (9/26/2023)    [employer] - Occupational Stress Questionnaire     Feeling of Stress : Only a little   Housing Stability: Low Risk  (9/26/2023)    Housing Stability Vital Sign     Unable to Pay for Housing in the Last Year: No     Number of Places Lived in the Last Year: 1     Unstable Housing in the Last Year: No        FAMILY HISTORY:   Family History   Problem Relation Name Age of Onset    Hearing loss Father      Hypertension Father      Emphysema Father      Skin cancer Father      Stomach cancer Sister      Hypertension Sister      COPD Sister      Rheum arthritis Sister      Cancer Brother      Melanoma Brother      COPD Daughter      Rheum arthritis Daughter            PHYSICAL EXAM:      There were no vitals filed for this visit.  There is no height or weight on file to calculate BMI.    GENERAL: Well-developed, well-nourished female . The patient is alert, oriented and cooperative.    HEENT:  Normocephalic, atraumatic.  Extraocular movements are intact bilaterally.     NECK:  Nontender with good range of motion.    LUNGS:  Clear to auscultation bilaterally.    HEART:  Regular rate and rhythm.     ABDOMEN:  Soft, non-tender, non-distended.      EXTREMITIES:   Bilateral hands was skin clean dry and intact, no soft tissue swelling, tenderness to palpation over A1 pulleys of bilateral ring fingers, locking and triggering of bilateral ring fingers with inability to extend, capillary refill less than 3 seconds, neurovascularly intact      RADIOGRAPHIC FINDINGS:   X-Ray Hand 3 View Bilateral    Result Date: 5/3/2024  EXAMINATION: XR HAND COMPLETE 3 VIEWS BILATERAL CLINICAL HISTORY: Pain in right hand COMPARISON: Left hand 13 March 2019 TECHNIQUE: XR HAND 3 VIEWS BILATERAL FINDINGS: No evidence of fracture seen.  The alignment of the joints appears  normal.  Mild-to-moderate 1st carpometacarpal joint and mild remaining joint degenerative change is present.  No soft tissue abnormality is seen.     Osteoarthrosis as described above. 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  Personally reviewed x-rays today with mild degenerative changes of 1st CMC joint, otherwise no acute fracture or dislocation.  Discussed all treatment options with the patient today.  Patient is unable to take NSAIDs, she is tried and Tylenol for pain without much relief.  She has had continued triggering.  Discussed possible steroid injection today, see procedural note for details.  Discussed that these can be repeated every 3-4 months as needed.  Discussed follow up with Dr. Castillo if she does not have any relief from steroid injections and wants to surgical repair.  Otherwise follow up p.r.n..    No follow-ups on file.       Frances Morrison PA-C      (Subject to voice recognition error, transcription service not allowed)

## 2024-05-07 ENCOUNTER — TELEPHONE (OUTPATIENT)
Dept: FAMILY MEDICINE | Facility: CLINIC | Age: 79
End: 2024-05-07
Payer: MEDICARE

## 2024-05-07 NOTE — TELEPHONE ENCOUNTER
----- Message from Tracie Gan sent at 5/7/2024  2:57 PM CDT -----  Refills:   Nasal spray  And refills for breathing machine    Pharmacy:  [facility] - [place], [place] - [address]

## 2024-05-13 DIAGNOSIS — J44.9 CHRONIC OBSTRUCTIVE PULMONARY DISEASE, UNSPECIFIED COPD TYPE: ICD-10-CM

## 2024-05-13 DIAGNOSIS — H65.23 BILATERAL CHRONIC SEROUS OTITIS MEDIA: ICD-10-CM

## 2024-05-13 DIAGNOSIS — M16.11 PRIMARY OSTEOARTHRITIS OF RIGHT HIP: ICD-10-CM

## 2024-05-13 NOTE — TELEPHONE ENCOUNTER
----- Message from Zeynep Garrett RN sent at 5/13/2024 12:37 PM CDT -----  Regarding: New Therapy Plan for Prolia  Pt will need a new therapy plan for Prolia because current plan is under old provider.  Next scheduled visit is 06/11/2024.  Thanks

## 2024-05-14 RX ORDER — IPRATROPIUM BROMIDE AND ALBUTEROL SULFATE 2.5; .5 MG/3ML; MG/3ML
3 SOLUTION RESPIRATORY (INHALATION) EVERY 6 HOURS PRN
Qty: 100 ML | Refills: 4 | Status: SHIPPED | OUTPATIENT
Start: 2024-05-14 | End: 2024-06-11 | Stop reason: SDUPTHER

## 2024-05-14 RX ORDER — IPRATROPIUM BROMIDE 42 UG/1
2 SPRAY, METERED NASAL 4 TIMES DAILY
Qty: 15 ML | Refills: 4 | Status: SHIPPED | OUTPATIENT
Start: 2024-05-14

## 2024-05-22 PROBLEM — H65.23 BILATERAL CHRONIC SEROUS OTITIS MEDIA: Status: ACTIVE | Noted: 2024-05-22

## 2024-05-22 PROBLEM — I82.592 CHRONIC DEEP VEIN THROMBOSIS (DVT) OF OTHER VEIN OF LEFT LOWER EXTREMITY: Status: ACTIVE | Noted: 2024-05-22

## 2024-05-22 PROBLEM — I10 HYPERTENSION: Status: ACTIVE | Noted: 2024-05-22

## 2024-05-22 PROBLEM — I69.351 HEMIPLEGIA AND HEMIPARESIS FOLLOWING CEREBRAL INFARCTION AFFECTING RIGHT DOMINANT SIDE: Status: ACTIVE | Noted: 2024-05-22

## 2024-05-22 PROBLEM — H65.23 BILATERAL CHRONIC SEROUS OTITIS MEDIA: Status: RESOLVED | Noted: 2024-05-22 | Resolved: 2024-05-22

## 2024-05-22 PROBLEM — M79.642 BILATERAL HAND PAIN: Status: ACTIVE | Noted: 2024-05-22

## 2024-05-22 PROBLEM — M79.641 BILATERAL HAND PAIN: Status: ACTIVE | Noted: 2024-05-22

## 2024-05-23 NOTE — PROGRESS NOTES
KEDAR Diaz   [facility]  [address]  [place] [address]     PATIENT NAME: Felicia Keita  : 1945  DATE: 24  MRN: [id number]      Billing Provider: KEDAR Diaz  Level of Service:   Patient PCP Information       Provider PCP Type    KEDAR Diaz General            Reason for Visit / Chief Complaint: Follow-up (Pt is here for her 3 month check up and she c/o of back pain and hand pain. She thinks its time to be a specialist)   Health Maintenance Due   Topic Date Due    TETANUS VACCINE  Never done    Shingles Vaccine (1 of 2) Never done    RSV Vaccine (Age 60+ and Pregnant patients) (1 - 1-dose 60+ series) Never done          Update PCP  Update Chief Complaint         History of Present Illness / Problem Focused Workflow     Felicia Keita presents to the clinic  for med refills and labs. Pt also has continued hand pain with trigger finger on both hands and would like to see ortho specialist for this. She also states she still feels like she has fluid on her ears. She denies any other needs a this time. NAD noted.     Hemoglobin A1C   Date Value Ref Range Status   2023 5.5 4.5 - 6.6 % Final     Comment:       Normal:               <5.7%  Pre-Diabetic:       5.7% to 6.4%  Diabetic:             >6.4%  Diabetic Goal:     <7%   2022 5.6 4.5 - 6.6 % Final     Comment:       Normal:               <5.7%  Pre-Diabetic:       5.7% to 6.4%  Diabetic:             >6.4%  Diabetic Goal:     <7%        CMP  Sodium   Date Value Ref Range Status   2024 136 136 - 145 mmol/L Final     Potassium   Date Value Ref Range Status   2024 3.9 3.5 - 5.1 mmol/L Final     Chloride   Date Value Ref Range Status   2024 98 98 - 107 mmol/L Final     CO2   Date Value Ref Range Status   2024 29 21 - 32 mmol/L Final     Glucose   Date Value Ref Range Status   2024 98 74 - 106 mg/dL Final     BUN   Date Value Ref Range Status   2024 12 7 - 18 mg/dL Final      Creatinine   Date Value Ref Range Status   04/25/2024 0.95 0.55 - 1.02 mg/dL Final     Calcium   Date Value Ref Range Status   04/25/2024 9.5 8.5 - 10.1 mg/dL Final     Total Protein   Date Value Ref Range Status   04/25/2024 7.2 6.4 - 8.2 g/dL Final     Albumin   Date Value Ref Range Status   04/25/2024 3.8 3.5 - 5.0 g/dL Final     Bilirubin, Total   Date Value Ref Range Status   04/25/2024 0.3 >0.0 - 1.2 mg/dL Final     Alk Phos   Date Value Ref Range Status   04/25/2024 99 55 - 142 U/L Final     AST   Date Value Ref Range Status   04/25/2024 32 15 - 37 U/L Final     ALT   Date Value Ref Range Status   04/25/2024 31 13 - 56 U/L Final     Anion Gap   Date Value Ref Range Status   04/25/2024 13 7 - 16 mmol/L Final     eGFR   Date Value Ref Range Status   04/25/2024 61 >=60 mL/min/1.73m2 Final        Lab Results   Component Value Date    WBC 5.54 04/25/2024    RBC 4.55 04/25/2024    HGB 12.3 04/25/2024    HCT 40.9 04/25/2024    MCV 89.9 04/25/2024    MCH 27.0 04/25/2024    MCHC 30.1 (L) 04/25/2024    RDW 14.7 (H) 04/25/2024     04/25/2024    MPV 11.5 04/25/2024    LYMPH 30.9 04/25/2024    LYMPH 1.71 04/25/2024    MONO 9.9 (H) 04/25/2024    EOS 0.18 04/25/2024    BASO 0.07 04/25/2024    EOSINOPHIL 3.2 04/25/2024    BASOPHIL 1.3 (H) 04/25/2024        Lab Results   Component Value Date    CHOL 168 04/25/2024    CHOL 171 01/25/2024    CHOL 165 09/26/2023     Lab Results   Component Value Date    HDL 88 (H) 04/25/2024    HDL 88 (H) 01/25/2024    HDL 82 (H) 09/26/2023     Lab Results   Component Value Date    LDLCALC 67 04/25/2024    LDLCALC 72 01/25/2024    LDLCALC 74 09/26/2023     Lab Results   Component Value Date    TRIG 63 04/25/2024    TRIG 57 01/25/2024    TRIG 44 09/26/2023     Lab Results   Component Value Date    CHOLHDL 1.9 04/25/2024    CHOLHDL 1.9 01/25/2024    CHOLHDL 2.0 09/26/2023        Wt Readings from Last 3 Encounters:   04/25/24 1037 88.4 kg (194 lb 12.8 oz)   01/25/24 0913 85.3 kg (188 lb)    12/04/23 1042 87.1 kg (192 lb)        BP Readings from Last 3 Encounters:   04/25/24 (!) 149/80   01/25/24 (!) 160/79   12/04/23 (!) 165/57        Review of Systems     Review of Systems   Constitutional: Negative.    Respiratory: Negative.     Cardiovascular: Negative.    Gastrointestinal: Negative.    Endocrine: Negative.    Genitourinary: Negative.    Musculoskeletal:  Positive for arthralgias and back pain.   Integumentary:  Negative.   Allergic/Immunologic: Negative.    Neurological: Negative.    Hematological: Negative.    Psychiatric/Behavioral: Negative.          Medical / Social / Family History     Past Medical History:   Diagnosis Date    Abnormal gait 05/22/2013    Actinic keratosis 08/12/2020    L forearm     Acute cough 03/30/2023    Allergic rhinitis 04/07/2020    Bilateral chronic serous otitis media 05/22/2024    Blepharophimosis 04/22/2014    senile    Cervical somatic dysfunction 08/15/2017    Cervicalgia 03/28/2019    Chronic peripheral venous hypertension 06/16/2015    Chronic serous otitis media, bilateral 09/05/2019    Chronic venous hypertension (idiopathic) without complications of unspecified lower extremity 08/15/2017    Conjunctival hemorrhage, right eye 11/02/2020    Deep venous thrombosis of lower extremity 09/17/2012    Degeneration of cervical intervertebral disc 10/21/2011    Degeneration of lumbar intervertebral disc 10/21/2011    Degenerative joint disease involving multiple joints 07/25/2011    Depressive disorder 07/25/2011    Essential hypertension 08/12/2020    Essential tremor 06/24/2014    GERD without esophagitis 08/15/2017    Headache 04/07/2020    Hyperlipidemia 05/08/2012    Insomnia 04/06/2016    Osteoarthritis 01/04/2017    Osteoporosis 11/07/2017    Otalgia, right ear 10/17/2016    Other cervical disc degeneration, unspecified cervical region 08/15/2017    Overflow incontinence 01/06/2020    Pain in right knee 01/06/2020    osteoarthritis    Pain in right leg  09/10/2020    Peripheral arterial occlusive disease 02/24/2015    Peripheral vascular disease, unspecified 08/06/2019    Peripheral venous insufficiency 09/29/2015    Personal history of PE (pulmonary embolism) 08/06/2019    Presence of vena cava filter 03/2015    Pulmonary embolus 03/10/2015    Pulmonary infarction 03/16/2012    Pure hypercholesterolemia 07/25/2011    Restless legs 05/22/2018    Right temporomandibular joint disorder, unspecified 08/18/2020    Rosacea 07/01/2015    Sciatica, right side 07/06/2016    Seborrheic keratosis 01/07/2021    Segmental and somatic dysfunction of thoracic region 03/28/2019    Senile osteoporosis 04/24/2017    Spasm of back muscles 07/25/2011    Stroke 09/25/2023    Trochanteric bursitis of right hip 01/06/2020    Unspecified urinary incontinence 02/07/2020    Vitamin D deficiency 10/27/2014       Past Surgical History:   Procedure Laterality Date    APPENDECTOMY      bone density  06/27/2019    Ajith/Abiel    BREAST SURGERY Left 2013    approximate    CARPAL TUNNEL RELEASE      BLI    LUMBAR LAMINECTOMY      prolia  03/12/2019    1mg    remove cataract Right     insert lens    TONSILLECTOMY      TOTAL ABDOMINAL HYSTERECTOMY      VAGINAL DELIVERY      vascular surgery procedure       Right SSV Laser Ablation performed by Dr. Carlo hernandez screen  05/24/2018       Social History  Ms.  reports that she has never smoked. She has never been exposed to tobacco smoke. She has never used smokeless tobacco. She reports that she does not drink alcohol and does not use drugs.    Family History  Ms.'s family history includes COPD in her daughter and sister; Cancer in her brother; Emphysema in her father; Hearing loss in her father; Hypertension in her father and sister; Melanoma in her brother; Rheum arthritis in her daughter and sister; Skin cancer in her father; Stomach cancer in her sister.    Medications and Allergies     Medications  Outpatient Medications Marked as Taking for  the 4/25/24 encounter (Office Visit) with Demi Harris FNP   Medication Sig Dispense Refill    albuterol (PROVENTIL/VENTOLIN HFA) 90 mcg/actuation inhaler Inhale 1-2 puffs into the lungs every 4 (four) hours as needed for Wheezing. Rescue 18 g 3    ascorbic acid, vitamin C, (VITAMIN C) 500 MG tablet Take 500 mg by mouth once daily.      calcium-vitamin D tablet 600 mg-200 units Take 1 tablet by mouth 2 (two) times a day.      cyanocobalamin/folic acid (VITAMIN B12-FOLIC ACID) 500-400 mcg Tab take 1 tablet by oral route daily Oral 1      denosumab (PROLIA) 60 mg/mL Syrg as directed Subcutaneous      diaper,brief,adult,disposable Misc 1 each by Misc.(Non-Drug; Combo Route) route continuous prn (prn). 120 each 5    diclofenac sodium (VOLTAREN) 1 % Gel Apply 2 g topically once daily. 2 g 3    furosemide (LASIX) 20 MG tablet Take 1 tablet (20 mg total) by mouth once daily. 90 tablet 1    gabapentin (NEURONTIN) 300 MG capsule TAKE 1 CAPSULE BY MOUTH IN THE MORNING, TAKE 1 CAPSULE at LUNCH, AND TAKE 2 CAPSULES AT BEDTIME 380 capsule 1    HYDROcodone-acetaminophen (NORCO)  mg per tablet 05/27/21 TAKE 1 TABLET BY MOUTH EVERY 8 HOURS AS NEEDED      losartan (COZAAR) 100 MG tablet Take 1 tablet (100 mg total) by mouth once daily. 90 tablet 1    omeprazole (PRILOSEC) 20 MG capsule Take 1 capsule (20 mg total) by mouth once daily. 90 capsule 1    pramipexole (MIRAPEX) 0.125 MG tablet Take 0.25 mg by mouth every evening.      pregabalin (LYRICA) 25 MG capsule Take 1 capsule (25 mg total) by mouth 2 (two) times daily. 180 capsule 0    primidone (MYSOLINE) 50 MG Tab TAKE 2 TABLETS BY MOUTH EVERY MORNING AND TAKE 3 TABLETS BY MOUTH EVERY EVENING 450 tablet 1    propranoloL (INDERAL) 40 MG tablet Take 40 mg by mouth 2 (two) times daily.      rivaroxaban (XARELTO) 20 mg Tab Take 1 tablet (20 mg total) by mouth once daily. 90 tablet 1    senna-docusate 8.6-50 mg (PERICOLACE) 8.6-50 mg per tablet Take 1 tablet by mouth once  "daily.      tiZANidine (ZANAFLEX) 4 MG tablet Take 1 tablet (4 mg total) by mouth 2 (two) times daily as needed (muscle spasms). 60 tablet 2    zinc gluconate 50 mg tablet Take 50 mg by mouth once daily.      [DISCONTINUED] albuterol-ipratropium (DUO-NEB) 2.5 mg-0.5 mg/3 mL nebulizer solution Take 3 mLs by nebulization every 6 (six) hours as needed for Wheezing. Rescue 100 mL 2    [DISCONTINUED] amLODIPine (NORVASC) 5 MG tablet Take 1 tablet (5 mg total) by mouth 2 (two) times daily. 180 tablet 1    [DISCONTINUED] atorvastatin (LIPITOR) 80 MG tablet Take 1 tablet (80 mg total) by mouth once daily. 90 tablet 1    [DISCONTINUED] celecoxib (CELEBREX) 200 MG capsule Take 200 mg by mouth once daily.      [DISCONTINUED] desvenlafaxine succinate (PRISTIQ) 100 MG Tb24 Take 1 tablet (100 mg total) by mouth once daily. 90 tablet 1    [DISCONTINUED] ipratropium (ATROVENT) 42 mcg (0.06 %) nasal spray 2 sprays by Each Nostril route 4 (four) times daily. 15 mL 0       Allergies  Review of patient's allergies indicates:  No Known Allergies    Physical Examination     Vitals:    04/25/24 1037   BP: (!) 149/80   BP Location: Right arm   Patient Position: Sitting   BP Method: Large (Automatic)   Pulse: (!) 57   Resp: 19   Temp: 98 °F (36.7 °C)   TempSrc: Oral   SpO2: 98%   Weight: 88.4 kg (194 lb 12.8 oz)   Height: 5' 7" (1.702 m)      Physical Exam  Constitutional:       Appearance: Normal appearance. She is obese.   HENT:      Head: Normocephalic.      Nose: Nose normal.   Eyes:      Extraocular Movements: Extraocular movements intact.   Cardiovascular:      Rate and Rhythm: Normal rate and regular rhythm.      Pulses: Normal pulses.      Heart sounds: Normal heart sounds.   Pulmonary:      Effort: Pulmonary effort is normal.      Breath sounds: Normal breath sounds.   Skin:     General: Skin is warm and dry.      Capillary Refill: Capillary refill takes less than 2 seconds.   Neurological:      General: No focal deficit present. "      Mental Status: She is alert and oriented to person, place, and time.   Psychiatric:         Mood and Affect: Mood normal.         Behavior: Behavior normal.          Assessment and Plan (including Health Maintenance)      Problem List  Smart Sets  Document Outside HM   :    Plan:     There are no Patient Instructions on file for this visit.       Health Maintenance Due   Topic Date Due    TETANUS VACCINE  Never done    Shingles Vaccine (1 of 2) Never done    RSV Vaccine (Age 60+ and Pregnant patients) (1 - 1-dose 60+ series) Never done       Problem List Items Addressed This Visit       Back pain    Current Assessment & Plan     Decadron 4mg/Depo Medrol 40mg injections given. No adverse reaction noted.   Pt followed by Pain management.   Instructed to RTC for any new/worsening/persisting ssx.           Relevant Medications    celecoxib (CELEBREX) 200 MG capsule    Bilateral hand pain    Current Assessment & Plan     Ortho referral pending.          Relevant Orders    Ambulatory referral/consult to Orthopedics    Chronic deep vein thrombosis (DVT) of other vein of left lower extremity    Current Assessment & Plan     Pt stable.          Chronic obstructive pulmonary disease    Current Assessment & Plan     COPD is controlled. Current medical regimen is effective;   Will adjust according to results and monitor.          Depression    Current Assessment & Plan     Diabetes is controlled. Current medical regimen is effective;   Will adjust according to results and monitor.          Relevant Medications    desvenlafaxine succinate (PRISTIQ) 100 MG Tb24    Hemiplegia and hemiparesis following cerebral infarction affecting right dominant side    Current Assessment & Plan     Pt stable.          Hyperlipidemia    Current Assessment & Plan     Labs pending. Will inform of results when available.          Relevant Medications    atorvastatin (LIPITOR) 80 MG tablet    Other Relevant Orders    CBC Auto Differential  (Completed)    Comprehensive Metabolic Panel (Completed)    Lipid Panel (Completed)    Hypertension - Primary    Current Assessment & Plan     Blood pressure is controlled. Current medical regimen is effective;   Will adjust according to results and monitor.     1) Check your blood pressure at home. Please notify me at the clinic if the systolic (top number) is consistently over 140 or under 110 or if the diastolic (bottom number) is consistently over 90 or under 60.  2) Regular aerobic physical activity ( e.g. brisk walking) at least 30 min 5 out of 7 days of the week  3) Low sodium diet.  4) Take your meds as directed  5) To the ER for any signs of chest pain/tightness/palpitations/shortness of breath/difficulty speaking/numbness or tingling in face or extremities  6) Have yearly eye exams           Relevant Medications    amLODIPine (NORVASC) 5 MG tablet    Other Relevant Orders    CBC Auto Differential (Completed)    Comprehensive Metabolic Panel (Completed)    Lipid Panel (Completed)    Osteoarthritis    Current Assessment & Plan     OA is not controlled. Current medical regimen is not effective;   Will adjust according to results and monitor.          Recurrent deep vein thrombosis (DVT)    Current Assessment & Plan     Pt stable.           Hypertension, unspecified type  -     amLODIPine (NORVASC) 5 MG tablet; Take 1 tablet (5 mg total) by mouth 2 (two) times daily.  Dispense: 180 tablet; Refill: 1  -     CBC Auto Differential; Future; Expected date: 04/25/2024  -     Comprehensive Metabolic Panel; Future; Expected date: 04/25/2024  -     Lipid Panel; Future; Expected date: 04/25/2024    Mild episode of recurrent major depressive disorder  -     desvenlafaxine succinate (PRISTIQ) 100 MG Tb24; Take 1 tablet (100 mg total) by mouth once daily.  Dispense: 90 tablet; Refill: 1    Mixed hyperlipidemia  -     atorvastatin (LIPITOR) 80 MG tablet; Take 1 tablet (80 mg total) by mouth once daily.  Dispense: 90 tablet;  Refill: 1  -     CBC Auto Differential; Future; Expected date: 04/25/2024  -     Comprehensive Metabolic Panel; Future; Expected date: 04/25/2024  -     Lipid Panel; Future; Expected date: 04/25/2024    Chronic midline low back pain with right-sided sciatica  -     celecoxib (CELEBREX) 200 MG capsule; Take 1 capsule (200 mg total) by mouth once daily.  Dispense: 90 capsule; Refill: 1  -     dexAMETHasone injection 4 mg  -     methylPREDNISolone acetate injection 40 mg    Bilateral hand pain  -     Ambulatory referral/consult to Orthopedics; Future; Expected date: 05/02/2024    Chronic obstructive pulmonary disease, unspecified COPD type    Primary osteoarthritis of right hip    Hemiplegia and hemiparesis following cerebral infarction affecting right dominant side    Chronic deep vein thrombosis (DVT) of other vein of left lower extremity    Recurrent deep vein thrombosis (DVT)       Health Maintenance Topics with due status: Not Due       Topic Last Completion Date    Influenza Vaccine 03/30/2023    DEXA Scan 07/27/2023    Lipid Panel 04/25/2024         Future Appointments   Date Time Provider Department Center   6/11/2024 10:00 AM INFUSION, [facility]   7/25/2024 11:00 AM Demi Harris FNP [facility]   8/5/2024 10:00 AM [facility]        Follow up in about 3 months (around 7/25/2024), or if symptoms worsen or fail to improve.    Health Maintenance Due   Topic Date Due    TETANUS VACCINE  Never done    Shingles Vaccine (1 of 2) Never done    RSV Vaccine (Age 60+ and Pregnant patients) (1 - 1-dose 60+ series) Never done        Signature:  KEDAR Diaz    Date of encounter: 4/25/24

## 2024-05-23 NOTE — ASSESSMENT & PLAN NOTE
Blood pressure is controlled. Current medical regimen is effective;   Will adjust according to results and monitor.     1) Check your blood pressure at home. Please notify me at the clinic if the systolic (top number) is consistently over 140 or under 110 or if the diastolic (bottom number) is consistently over 90 or under 60.  2) Regular aerobic physical activity ( e.g. brisk walking) at least 30 min 5 out of 7 days of the week  3) Low sodium diet.  4) Take your meds as directed  5) To the ER for any signs of chest pain/tightness/palpitations/shortness of breath/difficulty speaking/numbness or tingling in face or extremities  6) Have yearly eye exams

## 2024-05-23 NOTE — ASSESSMENT & PLAN NOTE
OA is not controlled. Current medical regimen is not effective;   Will adjust according to results and monitor.

## 2024-06-11 DIAGNOSIS — J44.9 CHRONIC OBSTRUCTIVE PULMONARY DISEASE, UNSPECIFIED COPD TYPE: ICD-10-CM

## 2024-06-11 RX ORDER — ALBUTEROL SULFATE 90 UG/1
1-2 AEROSOL, METERED RESPIRATORY (INHALATION) EVERY 4 HOURS PRN
Qty: 18 G | Refills: 3 | Status: SHIPPED | OUTPATIENT
Start: 2024-06-11

## 2024-06-11 RX ORDER — IPRATROPIUM BROMIDE AND ALBUTEROL SULFATE 2.5; .5 MG/3ML; MG/3ML
3 SOLUTION RESPIRATORY (INHALATION) EVERY 6 HOURS PRN
Qty: 100 ML | Refills: 4 | Status: SHIPPED | OUTPATIENT
Start: 2024-06-11

## 2024-06-12 ENCOUNTER — APPOINTMENT (OUTPATIENT)
Dept: LAB | Facility: HOSPITAL | Age: 79
End: 2024-06-12
Attending: NURSE PRACTITIONER
Payer: MEDICARE

## 2024-06-12 DIAGNOSIS — E83.51 HYPOCALCEMIA: Primary | ICD-10-CM

## 2024-06-12 DIAGNOSIS — M81.0 OSTEOPOROSIS, UNSPECIFIED OSTEOPOROSIS TYPE, UNSPECIFIED PATHOLOGICAL FRACTURE PRESENCE: Primary | ICD-10-CM

## 2024-06-12 DIAGNOSIS — E83.51 HYPOCALCEMIA: ICD-10-CM

## 2024-06-12 LAB — CALCIUM SERPL-MCNC: 8.2 MG/DL (ref 8.5–10.1)

## 2024-06-12 RX ORDER — PSYLLIUM HUSK 0.4 G
1 CAPSULE ORAL DAILY
Qty: 90 TABLET | Refills: 1 | Status: SHIPPED | OUTPATIENT
Start: 2024-06-12

## 2024-06-14 DIAGNOSIS — G25.81 RESTLESS LEGS: ICD-10-CM

## 2024-06-14 DIAGNOSIS — G25.81 RESTLESS LEGS: Primary | ICD-10-CM

## 2024-06-14 RX ORDER — PRAMIPEXOLE DIHYDROCHLORIDE 0.12 MG/1
0.25 TABLET ORAL NIGHTLY
Qty: 180 TABLET | Refills: 1 | OUTPATIENT
Start: 2024-06-14

## 2024-06-14 RX ORDER — PRAMIPEXOLE DIHYDROCHLORIDE 0.12 MG/1
0.25 TABLET ORAL NIGHTLY
Qty: 180 TABLET | Refills: 1 | Status: SHIPPED | OUTPATIENT
Start: 2024-06-14

## 2024-06-26 ENCOUNTER — INFUSION (OUTPATIENT)
Dept: INFUSION THERAPY | Facility: HOSPITAL | Age: 79
End: 2024-06-26
Attending: NURSE PRACTITIONER
Payer: MEDICARE

## 2024-06-26 VITALS
HEART RATE: 56 BPM | RESPIRATION RATE: 18 BRPM | OXYGEN SATURATION: 95 % | TEMPERATURE: 99 F | DIASTOLIC BLOOD PRESSURE: 83 MMHG | SYSTOLIC BLOOD PRESSURE: 173 MMHG

## 2024-06-26 DIAGNOSIS — M81.0 AGE-RELATED OSTEOPOROSIS WITHOUT CURRENT PATHOLOGICAL FRACTURE: Primary | ICD-10-CM

## 2024-06-26 PROCEDURE — 96372 THER/PROPH/DIAG INJ SC/IM: CPT

## 2024-06-26 PROCEDURE — 63600175 PHARM REV CODE 636 W HCPCS: Mod: JZ,TB | Performed by: NURSE PRACTITIONER

## 2024-06-26 RX ADMIN — DENOSUMAB 60 MG: 60 INJECTION SUBCUTANEOUS at 09:06

## 2024-06-26 NOTE — PROGRESS NOTES
0850 Pt in room 1.  Calcium level checked within last 30 days was 9.6.  Pt stated that she is taking her Calcium with Vitamin D.     0910 Administered Prolia 60mg SQ to left upper arm per protocol.  Pt tolerated well.    0930 No adverse reaction noted.  D/C home, ambulatory with appointment in hand to return in 6 months for next Prolia injection.

## 2024-07-05 DIAGNOSIS — M54.42 CHRONIC LOW BACK PAIN WITH LEFT-SIDED SCIATICA, UNSPECIFIED BACK PAIN LATERALITY: ICD-10-CM

## 2024-07-05 DIAGNOSIS — G89.29 CHRONIC LOW BACK PAIN WITH LEFT-SIDED SCIATICA, UNSPECIFIED BACK PAIN LATERALITY: ICD-10-CM

## 2024-07-05 RX ORDER — PREGABALIN 25 MG/1
25 CAPSULE ORAL 2 TIMES DAILY
Qty: 60 CAPSULE | Refills: 0 | Status: SHIPPED | OUTPATIENT
Start: 2024-07-05 | End: 2024-10-03

## 2024-07-05 RX ORDER — PROPRANOLOL HYDROCHLORIDE 40 MG/1
40 TABLET ORAL 2 TIMES DAILY
Qty: 60 TABLET | Refills: 0 | Status: SHIPPED | OUTPATIENT
Start: 2024-07-05

## 2024-07-26 ENCOUNTER — OFFICE VISIT (OUTPATIENT)
Dept: FAMILY MEDICINE | Facility: CLINIC | Age: 79
End: 2024-07-26
Payer: MEDICARE

## 2024-07-26 VITALS
TEMPERATURE: 98 F | DIASTOLIC BLOOD PRESSURE: 82 MMHG | OXYGEN SATURATION: 97 % | RESPIRATION RATE: 19 BRPM | HEART RATE: 61 BPM | HEIGHT: 67 IN | BODY MASS INDEX: 31.11 KG/M2 | WEIGHT: 198.19 LBS | SYSTOLIC BLOOD PRESSURE: 167 MMHG

## 2024-07-26 DIAGNOSIS — G89.29 CHRONIC MIDLINE LOW BACK PAIN WITH RIGHT-SIDED SCIATICA: ICD-10-CM

## 2024-07-26 DIAGNOSIS — M54.41 CHRONIC MIDLINE LOW BACK PAIN WITH RIGHT-SIDED SCIATICA: ICD-10-CM

## 2024-07-26 DIAGNOSIS — E78.2 MIXED HYPERLIPIDEMIA: ICD-10-CM

## 2024-07-26 DIAGNOSIS — I10 HYPERTENSION, UNSPECIFIED TYPE: Primary | ICD-10-CM

## 2024-07-26 DIAGNOSIS — G25.81 RESTLESS LEGS: ICD-10-CM

## 2024-07-26 DIAGNOSIS — G62.9 NEUROPATHY: ICD-10-CM

## 2024-07-26 DIAGNOSIS — L98.9 SKIN LESION OF BACK: ICD-10-CM

## 2024-07-26 LAB
ALBUMIN SERPL BCP-MCNC: 3.9 G/DL (ref 3.5–5)
ALBUMIN/GLOB SERPL: 1.3 {RATIO}
ALP SERPL-CCNC: 103 U/L (ref 55–142)
ALT SERPL W P-5'-P-CCNC: 36 U/L (ref 13–56)
ANION GAP SERPL CALCULATED.3IONS-SCNC: 10 MMOL/L (ref 7–16)
AST SERPL W P-5'-P-CCNC: 29 U/L (ref 15–37)
BASOPHILS # BLD AUTO: 0.07 K/UL (ref 0–0.2)
BASOPHILS NFR BLD AUTO: 1.3 % (ref 0–1)
BILIRUB SERPL-MCNC: 0.2 MG/DL (ref ?–1.2)
BUN SERPL-MCNC: 10 MG/DL (ref 7–18)
BUN/CREAT SERPL: 7 (ref 6–20)
CALCIUM SERPL-MCNC: 8.9 MG/DL (ref 8.5–10.1)
CHLORIDE SERPL-SCNC: 99 MMOL/L (ref 98–107)
CHOLEST SERPL-MCNC: 157 MG/DL (ref 0–200)
CHOLEST/HDLC SERPL: 1.6 {RATIO}
CO2 SERPL-SCNC: 28 MMOL/L (ref 21–32)
CREAT SERPL-MCNC: 1.39 MG/DL (ref 0.55–1.02)
DIFFERENTIAL METHOD BLD: ABNORMAL
EGFR (NO RACE VARIABLE) (RUSH/TITUS): 38 ML/MIN/1.73M2
EOSINOPHIL # BLD AUTO: 0.25 K/UL (ref 0–0.5)
EOSINOPHIL NFR BLD AUTO: 4.6 % (ref 1–4)
ERYTHROCYTE [DISTWIDTH] IN BLOOD BY AUTOMATED COUNT: 14.8 % (ref 11.5–14.5)
GLOBULIN SER-MCNC: 3.1 G/DL (ref 2–4)
GLUCOSE SERPL-MCNC: 92 MG/DL (ref 74–106)
HCT VFR BLD AUTO: 41.7 % (ref 38–47)
HDLC SERPL-MCNC: 96 MG/DL (ref 40–60)
HGB BLD-MCNC: 12.8 G/DL (ref 12–16)
IMM GRANULOCYTES # BLD AUTO: 0.01 K/UL (ref 0–0.04)
IMM GRANULOCYTES NFR BLD: 0.2 % (ref 0–0.4)
LDLC SERPL CALC-MCNC: 50 MG/DL
LYMPHOCYTES # BLD AUTO: 1.6 K/UL (ref 1–4.8)
LYMPHOCYTES NFR BLD AUTO: 29.3 % (ref 27–41)
MCH RBC QN AUTO: 27.4 PG (ref 27–31)
MCHC RBC AUTO-ENTMCNC: 30.7 G/DL (ref 32–36)
MCV RBC AUTO: 89.1 FL (ref 80–96)
MONOCYTES # BLD AUTO: 0.47 K/UL (ref 0–0.8)
MONOCYTES NFR BLD AUTO: 8.6 % (ref 2–6)
MPC BLD CALC-MCNC: 11.4 FL (ref 9.4–12.4)
NEUTROPHILS # BLD AUTO: 3.07 K/UL (ref 1.8–7.7)
NEUTROPHILS NFR BLD AUTO: 56 % (ref 53–65)
NONHDLC SERPL-MCNC: 61 MG/DL
NRBC # BLD AUTO: 0 X10E3/UL
NRBC, AUTO (.00): 0 %
PLATELET # BLD AUTO: 289 K/UL (ref 150–400)
POTASSIUM SERPL-SCNC: 3.9 MMOL/L (ref 3.5–5.1)
PROT SERPL-MCNC: 7 G/DL (ref 6.4–8.2)
RBC # BLD AUTO: 4.68 M/UL (ref 4.2–5.4)
SODIUM SERPL-SCNC: 133 MMOL/L (ref 136–145)
T3FREE SERPL-MCNC: 2.41 PG/ML (ref 2.18–3.98)
T4 FREE SERPL-MCNC: 1.05 NG/DL (ref 0.76–1.46)
TRIGL SERPL-MCNC: 54 MG/DL (ref 35–150)
TSH SERPL DL<=0.005 MIU/L-ACNC: 2.09 UIU/ML (ref 0.36–3.74)
VLDLC SERPL-MCNC: 11 MG/DL
WBC # BLD AUTO: 5.47 K/UL (ref 4.5–11)

## 2024-07-26 PROCEDURE — 80061 LIPID PANEL: CPT | Mod: ,,, | Performed by: CLINICAL MEDICAL LABORATORY

## 2024-07-26 PROCEDURE — 3288F FALL RISK ASSESSMENT DOCD: CPT | Mod: ,,, | Performed by: NURSE PRACTITIONER

## 2024-07-26 PROCEDURE — 80050 GENERAL HEALTH PANEL: CPT | Mod: ,,, | Performed by: CLINICAL MEDICAL LABORATORY

## 2024-07-26 PROCEDURE — 84439 ASSAY OF FREE THYROXINE: CPT | Mod: ,,, | Performed by: CLINICAL MEDICAL LABORATORY

## 2024-07-26 PROCEDURE — 3079F DIAST BP 80-89 MM HG: CPT | Mod: ,,, | Performed by: NURSE PRACTITIONER

## 2024-07-26 PROCEDURE — 3077F SYST BP >= 140 MM HG: CPT | Mod: ,,, | Performed by: NURSE PRACTITIONER

## 2024-07-26 PROCEDURE — 96372 THER/PROPH/DIAG INJ SC/IM: CPT | Mod: ,,, | Performed by: NURSE PRACTITIONER

## 2024-07-26 PROCEDURE — 1125F AMNT PAIN NOTED PAIN PRSNT: CPT | Mod: ,,, | Performed by: NURSE PRACTITIONER

## 2024-07-26 PROCEDURE — 84481 FREE ASSAY (FT-3): CPT | Mod: ,,, | Performed by: CLINICAL MEDICAL LABORATORY

## 2024-07-26 PROCEDURE — 99214 OFFICE O/P EST MOD 30 MIN: CPT | Mod: 25,,, | Performed by: NURSE PRACTITIONER

## 2024-07-26 PROCEDURE — 1101F PT FALLS ASSESS-DOCD LE1/YR: CPT | Mod: ,,, | Performed by: NURSE PRACTITIONER

## 2024-07-26 RX ORDER — PRAMIPEXOLE DIHYDROCHLORIDE 0.25 MG/1
0.25 TABLET ORAL 3 TIMES DAILY
Qty: 90 TABLET | Refills: 1 | Status: SHIPPED | OUTPATIENT
Start: 2024-07-26 | End: 2025-07-26

## 2024-07-26 RX ORDER — DEXAMETHASONE SODIUM PHOSPHATE 4 MG/ML
4 INJECTION, SOLUTION INTRA-ARTICULAR; INTRALESIONAL; INTRAMUSCULAR; INTRAVENOUS; SOFT TISSUE
Status: COMPLETED | OUTPATIENT
Start: 2024-07-26 | End: 2024-07-26

## 2024-07-26 RX ORDER — PREGABALIN 50 MG/1
50 CAPSULE ORAL 2 TIMES DAILY
Qty: 60 CAPSULE | Refills: 5 | Status: SHIPPED | OUTPATIENT
Start: 2024-07-26 | End: 2025-01-24

## 2024-07-26 RX ADMIN — DEXAMETHASONE SODIUM PHOSPHATE 4 MG: 4 INJECTION, SOLUTION INTRA-ARTICULAR; INTRALESIONAL; INTRAMUSCULAR; INTRAVENOUS; SOFT TISSUE at 12:07

## 2024-07-26 NOTE — PROGRESS NOTES
KEDAR Diaz   [facility]  [address]  [place] [address]     PATIENT NAME: Felicia Keita  : 1945  DATE: 24  MRN: [id number]      Billing Provider: KEDAR Diaz  Level of Service:   Patient PCP Information       Provider PCP Type    KEDAR Diaz General            Reason for Visit / Chief Complaint: Hypertension (Pt is here for her 3 month follow up. She is having some back pain along with some anxiety. She is having to take care of daughter and its wearing out. She didn't bring her medications with her. She said she would call with refills. Her rest legs are giving her trouble.) and Otalgia (Pt is also c/o right ear pain for a couple of days.)   Health Maintenance Due   Topic Date Due    TETANUS VACCINE  Never done    RSV Vaccine (Age 60+ and Pregnant patients) (1 - 1-dose 60+ series) Never done    COVID-19 Vaccine (2023- season) Never done          Update PCP  Update Chief Complaint         History of Present Illness / Problem Focused Workflow     Felicia Keita presents to the clinic for check up. She states her back pain has been bothering her recently and she does see Pain Management.  She is requesting steroid injection as this does help her back pain. She is also having RLS that have been more painful. She does have varicose veins and has seen Dr. Carlo Thibodeaux for this in the past. She is not followed by vascular currently and declines referral to vascular now. 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 She denies any other needs at this time. NAD noted.     Hemoglobin A1C   Date Value Ref Range Status   2023 5.5 4.5 - 6.6 % Final     Comment:       Normal:               <5.7%  Pre-Diabetic:       5.7% to 6.4%  Diabetic:             >6.4%  Diabetic Goal:     <7%   2022 5.6 4.5 - 6.6 % Final     Comment:       Normal:               <5.7%  Pre-Diabetic:       5.7% to 6.4%  Diabetic:             >6.4%  Diabetic Goal:     <7%         CMP  Sodium   Date Value Ref Range Status   07/26/2024 133 (L) 136 - 145 mmol/L Final     Potassium   Date Value Ref Range Status   07/26/2024 3.9 3.5 - 5.1 mmol/L Final     Chloride   Date Value Ref Range Status   07/26/2024 99 98 - 107 mmol/L Final     CO2   Date Value Ref Range Status   07/26/2024 28 21 - 32 mmol/L Final     Glucose   Date Value Ref Range Status   07/26/2024 92 74 - 106 mg/dL Final     BUN   Date Value Ref Range Status   07/26/2024 10 7 - 18 mg/dL Final     Creatinine   Date Value Ref Range Status   07/26/2024 1.39 (H) 0.55 - 1.02 mg/dL Final     Calcium   Date Value Ref Range Status   07/26/2024 8.9 8.5 - 10.1 mg/dL Final     Total Protein   Date Value Ref Range Status   07/26/2024 7.0 6.4 - 8.2 g/dL Final     Albumin   Date Value Ref Range Status   07/26/2024 3.9 3.5 - 5.0 g/dL Final     Bilirubin, Total   Date Value Ref Range Status   07/26/2024 0.2 >0.0 - 1.2 mg/dL Final     Alk Phos   Date Value Ref Range Status   07/26/2024 103 55 - 142 U/L Final     AST   Date Value Ref Range Status   07/26/2024 29 15 - 37 U/L Final     ALT   Date Value Ref Range Status   07/26/2024 36 13 - 56 U/L Final     Anion Gap   Date Value Ref Range Status   07/26/2024 10 7 - 16 mmol/L Final     eGFR   Date Value Ref Range Status   07/26/2024 38 (L) >=60 mL/min/1.73m2 Final        Lab Results   Component Value Date    WBC 5.47 07/26/2024    RBC 4.68 07/26/2024    HGB 12.8 07/26/2024    HCT 41.7 07/26/2024    MCV 89.1 07/26/2024    MCH 27.4 07/26/2024    MCHC 30.7 (L) 07/26/2024    RDW 14.8 (H) 07/26/2024     07/26/2024    MPV 11.4 07/26/2024    LYMPH 29.3 07/26/2024    LYMPH 1.60 07/26/2024    MONO 8.6 (H) 07/26/2024    EOS 0.25 07/26/2024    BASO 0.07 07/26/2024    EOSINOPHIL 4.6 (H) 07/26/2024    BASOPHIL 1.3 (H) 07/26/2024        Lab Results   Component Value Date    CHOL 157 07/26/2024    CHOL 168 04/25/2024    CHOL 171 01/25/2024     Lab Results   Component Value Date    HDL 96 (H) 07/26/2024    HDL  88 (H) 04/25/2024    HDL 88 (H) 01/25/2024     Lab Results   Component Value Date    LDLCALC 50 07/26/2024    LDLCALC 67 04/25/2024    LDLCALC 72 01/25/2024     Lab Results   Component Value Date    TRIG 54 07/26/2024    TRIG 63 04/25/2024    TRIG 57 01/25/2024     Lab Results   Component Value Date    CHOLHDL 1.6 07/26/2024    CHOLHDL 1.9 04/25/2024    CHOLHDL 1.9 01/25/2024        Wt Readings from Last 3 Encounters:   08/13/24 1314 89.4 kg (197 lb)   08/07/24 0823 89.4 kg (197 lb)   08/06/24 0809 89.4 kg (197 lb 1 oz)        BP Readings from Last 3 Encounters:   08/07/24 120/70   08/06/24 136/74   07/26/24 (!) 167/82        Review of Systems     Review of Systems   Constitutional: Negative.    Respiratory: Negative.     Cardiovascular: Negative.    Gastrointestinal: Negative.    Endocrine: Negative.    Genitourinary: Negative.    Musculoskeletal:  Positive for arthralgias and back pain.   Integumentary:  Positive for mole/lesion. Negative.   Allergic/Immunologic: Negative.    Neurological: Negative.    Hematological: Negative.    Psychiatric/Behavioral: Negative.          Medical / Social / Family History     Past Medical History:   Diagnosis Date    Abnormal gait 05/22/2013    Actinic keratosis 08/12/2020    L forearm     Acute cough 03/30/2023    Allergic rhinitis 04/07/2020    Bilateral chronic serous otitis media 05/22/2024    Blepharophimosis 04/22/2014    senile    Cervical somatic dysfunction 08/15/2017    Cervicalgia 03/28/2019    Chronic peripheral venous hypertension 06/16/2015    Chronic serous otitis media, bilateral 09/05/2019    Chronic venous hypertension (idiopathic) without complications of unspecified lower extremity 08/15/2017    Conjunctival hemorrhage, right eye 11/02/2020    Deep venous thrombosis of lower extremity 09/17/2012    Degeneration of cervical intervertebral disc 10/21/2011    Degeneration of lumbar intervertebral disc 10/21/2011    Degenerative joint disease involving multiple  joints 07/25/2011    Depressive disorder 07/25/2011    Essential hypertension 08/12/2020    Essential tremor 06/24/2014    GERD without esophagitis 08/15/2017    Headache 04/07/2020    Hyperlipidemia 05/08/2012    Insomnia 04/06/2016    Osteoarthritis 01/04/2017    Osteoporosis 11/07/2017    Otalgia, right ear 10/17/2016    Other cervical disc degeneration, unspecified cervical region 08/15/2017    Overflow incontinence 01/06/2020    Pain in right knee 01/06/2020    osteoarthritis    Pain in right leg 09/10/2020    Peripheral arterial occlusive disease 02/24/2015    Peripheral vascular disease, unspecified 08/06/2019    Peripheral venous insufficiency 09/29/2015    Personal history of PE (pulmonary embolism) 08/06/2019    Presence of vena cava filter 03/2015    Pulmonary embolus 03/10/2015    Pulmonary infarction 03/16/2012    Pure hypercholesterolemia 07/25/2011    Restless legs 05/22/2018    Right temporomandibular joint disorder, unspecified 08/18/2020    Rosacea 07/01/2015    Sciatica, right side 07/06/2016    Seborrheic keratosis 01/07/2021    Segmental and somatic dysfunction of thoracic region 03/28/2019    Senile osteoporosis 04/24/2017    Spasm of back muscles 07/25/2011    Stroke 09/25/2023    Trochanteric bursitis of right hip 01/06/2020    Unspecified urinary incontinence 02/07/2020    Vitamin D deficiency 10/27/2014       Past Surgical History:   Procedure Laterality Date    APPENDECTOMY      bone density  06/27/2019    Ajith/Abiel    BREAST SURGERY Left 2013    approximate    CARPAL TUNNEL RELEASE      BLI    LUMBAR LAMINECTOMY      prolia  03/12/2019    1mg    remove cataract Right     insert lens    TONSILLECTOMY      TOTAL ABDOMINAL HYSTERECTOMY      VAGINAL DELIVERY      vascular surgery procedure       Right SSV Laser Ablation performed by Dr. Carlo hernandez screen  05/24/2018       Social History  Ms.  reports that she has never smoked. She has never been exposed to tobacco smoke. She has  never used smokeless tobacco. She reports that she does not drink alcohol and does not use drugs.    Family History  Ms.'s family history includes COPD in her daughter and sister; Cancer in her brother; Emphysema in her father; Hearing loss in her father; Hypertension in her father and sister; Melanoma in her brother; Rheum arthritis in her daughter and sister; Skin cancer in her father; Stomach cancer in her sister.    Medications and Allergies     Medications  Outpatient Medications Marked as Taking for the 7/26/24 encounter (Office Visit) with Demi Harris FNP   Medication Sig Dispense Refill    albuterol (PROVENTIL/VENTOLIN HFA) 90 mcg/actuation inhaler Inhale 1-2 puffs into the lungs every 4 (four) hours as needed for Wheezing. Rescue 18 g 3    albuterol-ipratropium (DUO-NEB) 2.5 mg-0.5 mg/3 mL nebulizer solution Take 3 mLs by nebulization every 6 (six) hours as needed for Wheezing. Rescue 100 mL 4    amLODIPine (NORVASC) 5 MG tablet Take 1 tablet (5 mg total) by mouth 2 (two) times daily. 180 tablet 1    ascorbic acid, vitamin C, (VITAMIN C) 500 MG tablet Take 500 mg by mouth once daily.      atorvastatin (LIPITOR) 80 MG tablet Take 1 tablet (80 mg total) by mouth once daily. 90 tablet 1    calcium carbonate-vitamin D3 1,000 mg-20 mcg (800 unit) Tab Take 1 each by mouth Daily. 90 tablet 1    celecoxib (CELEBREX) 200 MG capsule Take 1 capsule (200 mg total) by mouth once daily. 90 capsule 1    cyanocobalamin/folic acid (VITAMIN B12-FOLIC ACID) 500-400 mcg Tab take 1 tablet by oral route daily Oral 1      denosumab (PROLIA) 60 mg/mL Syrg as directed Subcutaneous      desvenlafaxine succinate (PRISTIQ) 100 MG Tb24 Take 1 tablet (100 mg total) by mouth once daily. 90 tablet 1    diaper,brief,adult,disposable Misc 1 each by Misc.(Non-Drug; Combo Route) route continuous prn (prn). 120 each 5    diclofenac sodium (VOLTAREN) 1 % Gel Apply 2 g topically once daily. 2 g 3    furosemide (LASIX) 20 MG tablet Take 1  "tablet (20 mg total) by mouth once daily. 90 tablet 1    gabapentin (NEURONTIN) 300 MG capsule TAKE 1 CAPSULE BY MOUTH IN THE MORNING, TAKE 1 CAPSULE at LUNCH, AND TAKE 2 CAPSULES AT BEDTIME 380 capsule 1    HYDROcodone-acetaminophen (NORCO)  mg per tablet 05/27/21 TAKE 1 TABLET BY MOUTH EVERY 8 HOURS AS NEEDED      ipratropium (ATROVENT) 42 mcg (0.06 %) nasal spray 2 sprays by Each Nostril route 4 (four) times daily. 15 mL 4    losartan (COZAAR) 100 MG tablet Take 1 tablet (100 mg total) by mouth once daily. 90 tablet 1    omeprazole (PRILOSEC) 20 MG capsule Take 1 capsule (20 mg total) by mouth once daily. 90 capsule 1    primidone (MYSOLINE) 50 MG Tab TAKE 2 TABLETS BY MOUTH EVERY MORNING AND TAKE 3 TABLETS BY MOUTH EVERY EVENING 450 tablet 1    propranoloL (INDERAL) 40 MG tablet Take 1 tablet (40 mg total) by mouth 2 (two) times daily. 60 tablet 0    rivaroxaban (XARELTO) 20 mg Tab Take 1 tablet (20 mg total) by mouth once daily. 90 tablet 1    senna-docusate 8.6-50 mg (PERICOLACE) 8.6-50 mg per tablet Take 1 tablet by mouth once daily.      zinc gluconate 50 mg tablet Take 50 mg by mouth once daily.      [DISCONTINUED] pramipexole (MIRAPEX) 0.125 MG tablet Take 2 tablets (0.25 mg total) by mouth every evening. 180 tablet 1    [DISCONTINUED] pregabalin (LYRICA) 25 MG capsule Take 1 capsule (25 mg total) by mouth 2 (two) times daily. 60 capsule 0    [DISCONTINUED] tiZANidine (ZANAFLEX) 4 MG tablet Take 1 tablet (4 mg total) by mouth 2 (two) times daily as needed (muscle spasms). 60 tablet 2       Allergies  Review of patient's allergies indicates:  No Known Allergies    Physical Examination     Vitals:    07/26/24 1106   BP: (!) 167/82   BP Location: Right arm   Patient Position: Sitting   BP Method: Large (Automatic)   Pulse: 61   Resp: 19   Temp: 98.2 °F (36.8 °C)   TempSrc: Oral   SpO2: 97%   Weight: 89.9 kg (198 lb 3.2 oz)   Height: 5' 7" (1.702 m)      Physical Exam  Constitutional:       Appearance: " Normal appearance. She is obese.   HENT:      Head: Normocephalic.   Eyes:      Extraocular Movements: Extraocular movements intact.   Cardiovascular:      Rate and Rhythm: Normal rate and regular rhythm.      Pulses: Normal pulses.      Heart sounds: Normal heart sounds.   Pulmonary:      Effort: Pulmonary effort is normal.      Breath sounds: Normal breath sounds.   Skin:     General: Skin is warm and dry.      Capillary Refill: Capillary refill takes less than 2 seconds.      Findings: Lesion present.      Comments: While nurse administering injection, she notices small mole to lower back area that looks suspicious; will refer to Derm   Neurological:      General: No focal deficit present.      Mental Status: She is alert and oriented to person, place, and time.   Psychiatric:         Mood and Affect: Mood normal.         Behavior: Behavior normal.          Assessment and Plan (including Health Maintenance)      Problem List  Smart Sets  Document Outside HM   :    Plan:     There are no Patient Instructions on file for this visit.       Health Maintenance Due   Topic Date Due    TETANUS VACCINE  Never done    RSV Vaccine (Age 60+ and Pregnant patients) (1 - 1-dose 60+ series) Never done    COVID-19 Vaccine (1 - 2023-24 season) Never done       Problem List Items Addressed This Visit       Back pain    Current Assessment & Plan     Decadron 4mg injection given. No adverse reaction noted.   Instructed to RTC for any new/worsening/persisting ssx.           Hyperlipidemia    Current Assessment & Plan     Hyperlipidemia is controlled. Current medical regimen is effective;   Will adjust according to results and monitor.          Relevant Orders    CBC Auto Differential (Completed)    Comprehensive Metabolic Panel (Completed)    Lipid Panel (Completed)    Hypertension - Primary    Current Assessment & Plan     Blood pressure is controlled. Current medical regimen is effective;   Will adjust according to results and  monitor.      1) Check your blood pressure at home. Please notify me at the clinic if the systolic (top number) is consistently over 140 or under 110 or if the diastolic (bottom number) is consistently over 90 or under 60.  2) Regular aerobic physical activity ( e.g. brisk walking) at least 30 min 5 out of 7 days of the week  3) Low sodium diet.  4) Take your meds as directed  5) To the ER for any signs of chest pain/tightness/palpitations/shortness of breath/difficulty speaking/numbness or tingling in face or extremities  6) Have yearly eye exams         Relevant Orders    CBC Auto Differential (Completed)    Comprehensive Metabolic Panel (Completed)    TSH (Completed)    T4, Free (Completed)    T3, Free (Completed)    Neuropathy    Current Assessment & Plan     Neuropathy  is controlled. Current medical regimen is effective;   Will adjust according to results and monitor.          Relevant Medications    pregabalin (LYRICA) 50 MG capsule    Restless legs    Current Assessment & Plan     Pt had not been taking mirapex 3 times daily as prescribed, only taking at night. Instructed this is prescribed 3 times daily or she could take 2 tablets at night to see if this will help her better. Pt vu.   Instructed to RTC for any new/worsening/persisting ssx.           Relevant Medications    pramipexole (MIRAPEX) 0.25 MG tablet    Skin lesion of back    Current Assessment & Plan     Referral pending to Dermatology.          Relevant Orders    Ambulatory referral/consult to Dermatology     Hypertension, unspecified type  -     CBC Auto Differential; Future; Expected date: 07/26/2024  -     Comprehensive Metabolic Panel; Future; Expected date: 07/26/2024  -     TSH; Future; Expected date: 07/26/2024  -     T4, Free; Future; Expected date: 07/26/2024  -     T3, Free; Future; Expected date: 07/26/2024    Mixed hyperlipidemia  -     CBC Auto Differential; Future; Expected date: 07/26/2024  -     Comprehensive Metabolic Panel;  Future; Expected date: 07/26/2024  -     Lipid Panel; Future; Expected date: 07/26/2024    Neuropathy  -     pregabalin (LYRICA) 50 MG capsule; Take 1 capsule (50 mg total) by mouth 2 (two) times daily.  Dispense: 60 capsule; Refill: 5    Restless legs  -     pramipexole (MIRAPEX) 0.25 MG tablet; Take 1 tablet (0.25 mg total) by mouth 3 (three) times daily.  Dispense: 90 tablet; Refill: 1    Chronic midline low back pain with right-sided sciatica  -     dexAMETHasone injection 4 mg    Skin lesion of back  -     Ambulatory referral/consult to Dermatology; Future; Expected date: 08/02/2024       Health Maintenance Topics with due status: Not Due       Topic Last Completion Date    Influenza Vaccine 03/30/2023    DEXA Scan 07/27/2023    Lipid Panel 07/26/2024         Future Appointments   Date Time Provider Department Center   10/28/2024 11:00 AM Demi Harris FNP [facility] [place]   12/3/2024  1:00 PM Cherri Thibodeaux MD FD DERM [place]   12/30/2024 10:00 AM INFUSION, [facility] [place]    8/11/2025  8:00 AM [facility] [place]        Follow up in about 3 months (around 10/26/2024), or if symptoms worsen or fail to improve.    Health Maintenance Due   Topic Date Due    TETANUS VACCINE  Never done    RSV Vaccine (Age 60+ and Pregnant patients) (1 - 1-dose 60+ series) Never done    COVID-19 Vaccine (1 - 2023-24 season) Never done        Signature:  KEDAR Diaz    Date of encounter: 7/26/24

## 2024-07-30 NOTE — PROGRESS NOTES
Please let pt know her labs look good other than her sodium was a little low, this could be due to her lasix. Also, her kidney function has declined some which could be due to the lasix as well. I would see how much she is having to take of this and decrease amount. Make sure she is drinking plenty of water. Her thyroid is normal. Thanks!

## 2024-07-31 DIAGNOSIS — R60.9 EDEMA, UNSPECIFIED TYPE: ICD-10-CM

## 2024-07-31 PROBLEM — L98.9 SKIN LESION OF BACK: Status: ACTIVE | Noted: 2024-07-31

## 2024-07-31 PROBLEM — G62.9 NEUROPATHY: Status: ACTIVE | Noted: 2024-07-31

## 2024-08-06 ENCOUNTER — OFFICE VISIT (OUTPATIENT)
Dept: FAMILY MEDICINE | Facility: CLINIC | Age: 79
End: 2024-08-06
Payer: MEDICARE

## 2024-08-06 VITALS
HEART RATE: 61 BPM | RESPIRATION RATE: 20 BRPM | DIASTOLIC BLOOD PRESSURE: 74 MMHG | WEIGHT: 197.06 LBS | HEIGHT: 67 IN | BODY MASS INDEX: 30.93 KG/M2 | TEMPERATURE: 99 F | SYSTOLIC BLOOD PRESSURE: 136 MMHG

## 2024-08-06 DIAGNOSIS — M81.0 OSTEOPOROSIS, UNSPECIFIED OSTEOPOROSIS TYPE, UNSPECIFIED PATHOLOGICAL FRACTURE PRESENCE: ICD-10-CM

## 2024-08-06 DIAGNOSIS — I10 HYPERTENSION, UNSPECIFIED TYPE: ICD-10-CM

## 2024-08-06 DIAGNOSIS — G89.29 CHRONIC RIGHT-SIDED LOW BACK PAIN WITH RIGHT-SIDED SCIATICA: ICD-10-CM

## 2024-08-06 DIAGNOSIS — G89.4 CHRONIC PAIN SYNDROME: ICD-10-CM

## 2024-08-06 DIAGNOSIS — R26.9 ABNORMALITY OF GAIT AND MOBILITY: ICD-10-CM

## 2024-08-06 DIAGNOSIS — Z00.00 ENCOUNTER FOR SUBSEQUENT ANNUAL WELLNESS VISIT (AWV) IN MEDICARE PATIENT: Primary | ICD-10-CM

## 2024-08-06 DIAGNOSIS — M54.41 CHRONIC RIGHT-SIDED LOW BACK PAIN WITH RIGHT-SIDED SCIATICA: ICD-10-CM

## 2024-08-06 DIAGNOSIS — G62.9 NEUROPATHY: ICD-10-CM

## 2024-08-06 DIAGNOSIS — H92.01 RIGHT EAR PAIN: ICD-10-CM

## 2024-08-06 DIAGNOSIS — N39.42 URINARY INCONTINENCE WITHOUT SENSORY AWARENESS: ICD-10-CM

## 2024-08-06 DIAGNOSIS — E78.2 MIXED HYPERLIPIDEMIA: ICD-10-CM

## 2024-08-06 DIAGNOSIS — J44.9 CHRONIC OBSTRUCTIVE PULMONARY DISEASE, UNSPECIFIED COPD TYPE: ICD-10-CM

## 2024-08-06 PROCEDURE — 1125F AMNT PAIN NOTED PAIN PRSNT: CPT | Mod: ,,, | Performed by: NURSE PRACTITIONER

## 2024-08-06 PROCEDURE — G0439 PPPS, SUBSEQ VISIT: HCPCS | Mod: ,,, | Performed by: NURSE PRACTITIONER

## 2024-08-06 PROCEDURE — 3078F DIAST BP <80 MM HG: CPT | Mod: ,,, | Performed by: NURSE PRACTITIONER

## 2024-08-06 PROCEDURE — 3075F SYST BP GE 130 - 139MM HG: CPT | Mod: ,,, | Performed by: NURSE PRACTITIONER

## 2024-08-06 PROCEDURE — 1170F FXNL STATUS ASSESSED: CPT | Mod: ,,, | Performed by: NURSE PRACTITIONER

## 2024-08-06 PROCEDURE — 1101F PT FALLS ASSESS-DOCD LE1/YR: CPT | Mod: ,,, | Performed by: NURSE PRACTITIONER

## 2024-08-06 PROCEDURE — 3288F FALL RISK ASSESSMENT DOCD: CPT | Mod: ,,, | Performed by: NURSE PRACTITIONER

## 2024-08-06 PROCEDURE — 1159F MED LIST DOCD IN RCRD: CPT | Mod: ,,, | Performed by: NURSE PRACTITIONER

## 2024-08-06 PROCEDURE — 1158F ADVNC CARE PLAN TLK DOCD: CPT | Mod: ,,, | Performed by: NURSE PRACTITIONER

## 2024-08-06 RX ORDER — DEXAMETHASONE SODIUM PHOSPHATE 4 MG/ML
4 INJECTION, SOLUTION INTRA-ARTICULAR; INTRALESIONAL; INTRAMUSCULAR; INTRAVENOUS; SOFT TISSUE
Status: DISCONTINUED | OUTPATIENT
Start: 2024-08-06 | End: 2024-08-06

## 2024-08-06 RX ORDER — METHYLPREDNISOLONE 4 MG/1
TABLET ORAL
Qty: 21 EACH | Refills: 0 | Status: SHIPPED | OUTPATIENT
Start: 2024-08-06 | End: 2024-08-27

## 2024-08-06 RX ORDER — METHYLPREDNISOLONE ACETATE 40 MG/ML
40 INJECTION, SUSPENSION INTRA-ARTICULAR; INTRALESIONAL; INTRAMUSCULAR; SOFT TISSUE
Status: DISCONTINUED | OUTPATIENT
Start: 2024-08-06 | End: 2024-08-06

## 2024-08-07 ENCOUNTER — OFFICE VISIT (OUTPATIENT)
Dept: FAMILY MEDICINE | Facility: CLINIC | Age: 79
End: 2024-08-07
Payer: MEDICARE

## 2024-08-07 VITALS
TEMPERATURE: 98 F | RESPIRATION RATE: 19 BRPM | HEIGHT: 67 IN | SYSTOLIC BLOOD PRESSURE: 120 MMHG | OXYGEN SATURATION: 98 % | HEART RATE: 62 BPM | DIASTOLIC BLOOD PRESSURE: 70 MMHG | BODY MASS INDEX: 30.92 KG/M2 | WEIGHT: 197 LBS

## 2024-08-07 DIAGNOSIS — R30.0 DYSURIA: ICD-10-CM

## 2024-08-07 DIAGNOSIS — M54.41 CHRONIC MIDLINE LOW BACK PAIN WITH RIGHT-SIDED SCIATICA: ICD-10-CM

## 2024-08-07 DIAGNOSIS — N39.0 URINARY TRACT INFECTION WITHOUT HEMATURIA, SITE UNSPECIFIED: Primary | ICD-10-CM

## 2024-08-07 DIAGNOSIS — G89.29 CHRONIC MIDLINE LOW BACK PAIN WITH RIGHT-SIDED SCIATICA: ICD-10-CM

## 2024-08-07 LAB
BILIRUB SERPL-MCNC: NORMAL MG/DL
BLOOD URINE, POC: NORMAL
CLARITY, POC UA: CLEAR
COLOR, POC UA: YELLOW
GLUCOSE UR QL STRIP: NORMAL
KETONES UR QL STRIP: NORMAL
LEUKOCYTE ESTERASE URINE, POC: NORMAL
NITRITE, POC UA: NORMAL
PH, POC UA: 7
PROTEIN, POC: NORMAL
SPECIFIC GRAVITY, POC UA: 1.01
UROBILINOGEN, POC UA: 0.2

## 2024-08-07 PROCEDURE — 87086 URINE CULTURE/COLONY COUNT: CPT | Mod: ,,, | Performed by: CLINICAL MEDICAL LABORATORY

## 2024-08-07 PROCEDURE — 87077 CULTURE AEROBIC IDENTIFY: CPT | Mod: ,,, | Performed by: CLINICAL MEDICAL LABORATORY

## 2024-08-07 PROCEDURE — 3078F DIAST BP <80 MM HG: CPT | Mod: ,,, | Performed by: NURSE PRACTITIONER

## 2024-08-07 PROCEDURE — 3288F FALL RISK ASSESSMENT DOCD: CPT | Mod: ,,, | Performed by: NURSE PRACTITIONER

## 2024-08-07 PROCEDURE — 87186 SC STD MICRODIL/AGAR DIL: CPT | Mod: ,,, | Performed by: CLINICAL MEDICAL LABORATORY

## 2024-08-07 PROCEDURE — 81003 URINALYSIS AUTO W/O SCOPE: CPT | Mod: QW,,, | Performed by: NURSE PRACTITIONER

## 2024-08-07 PROCEDURE — 99214 OFFICE O/P EST MOD 30 MIN: CPT | Mod: 25,,, | Performed by: NURSE PRACTITIONER

## 2024-08-07 PROCEDURE — 1101F PT FALLS ASSESS-DOCD LE1/YR: CPT | Mod: ,,, | Performed by: NURSE PRACTITIONER

## 2024-08-07 PROCEDURE — 3074F SYST BP LT 130 MM HG: CPT | Mod: ,,, | Performed by: NURSE PRACTITIONER

## 2024-08-07 PROCEDURE — 1159F MED LIST DOCD IN RCRD: CPT | Mod: ,,, | Performed by: NURSE PRACTITIONER

## 2024-08-07 PROCEDURE — 1160F RVW MEDS BY RX/DR IN RCRD: CPT | Mod: ,,, | Performed by: NURSE PRACTITIONER

## 2024-08-07 PROCEDURE — 96372 THER/PROPH/DIAG INJ SC/IM: CPT | Mod: ,,, | Performed by: NURSE PRACTITIONER

## 2024-08-07 PROCEDURE — 1126F AMNT PAIN NOTED NONE PRSNT: CPT | Mod: ,,, | Performed by: NURSE PRACTITIONER

## 2024-08-07 RX ORDER — CEFTRIAXONE 1 G/1
1 INJECTION, POWDER, FOR SOLUTION INTRAMUSCULAR; INTRAVENOUS
Status: COMPLETED | OUTPATIENT
Start: 2024-08-07 | End: 2024-08-07

## 2024-08-07 RX ORDER — DEXAMETHASONE SODIUM PHOSPHATE 4 MG/ML
4 INJECTION, SOLUTION INTRA-ARTICULAR; INTRALESIONAL; INTRAMUSCULAR; INTRAVENOUS; SOFT TISSUE
Status: COMPLETED | OUTPATIENT
Start: 2024-08-07 | End: 2024-08-07

## 2024-08-07 RX ORDER — NITROFURANTOIN 25; 75 MG/1; MG/1
100 CAPSULE ORAL 2 TIMES DAILY
Qty: 10 CAPSULE | Refills: 0 | Status: SHIPPED | OUTPATIENT
Start: 2024-08-07 | End: 2024-08-12

## 2024-08-07 RX ADMIN — DEXAMETHASONE SODIUM PHOSPHATE 4 MG: 4 INJECTION, SOLUTION INTRA-ARTICULAR; INTRALESIONAL; INTRAMUSCULAR; INTRAVENOUS; SOFT TISSUE at 08:08

## 2024-08-07 RX ADMIN — CEFTRIAXONE 1 G: 1 INJECTION, POWDER, FOR SOLUTION INTRAMUSCULAR; INTRAVENOUS at 08:08

## 2024-08-10 LAB — UA COMPLETE W REFLEX CULTURE PNL UR: ABNORMAL

## 2024-08-13 ENCOUNTER — OFFICE VISIT (OUTPATIENT)
Dept: OTOLARYNGOLOGY | Facility: CLINIC | Age: 79
End: 2024-08-13
Payer: MEDICARE

## 2024-08-13 VITALS — BODY MASS INDEX: 30.92 KG/M2 | HEIGHT: 67 IN | WEIGHT: 197 LBS

## 2024-08-13 DIAGNOSIS — H92.09 OTALGIA, UNSPECIFIED LATERALITY: ICD-10-CM

## 2024-08-13 DIAGNOSIS — H60.91 OTITIS EXTERNA OF RIGHT EAR, UNSPECIFIED CHRONICITY, UNSPECIFIED TYPE: Primary | ICD-10-CM

## 2024-08-13 PROCEDURE — 99999 PR PBB SHADOW E&M-EST. PATIENT-LVL III: CPT | Mod: PBBFAC,,, | Performed by: OTOLARYNGOLOGY

## 2024-08-13 PROCEDURE — 1160F RVW MEDS BY RX/DR IN RCRD: CPT | Mod: CPTII,,, | Performed by: OTOLARYNGOLOGY

## 2024-08-13 PROCEDURE — 1159F MED LIST DOCD IN RCRD: CPT | Mod: CPTII,,, | Performed by: OTOLARYNGOLOGY

## 2024-08-13 PROCEDURE — 99214 OFFICE O/P EST MOD 30 MIN: CPT | Mod: S$PBB,,, | Performed by: OTOLARYNGOLOGY

## 2024-08-13 PROCEDURE — 99213 OFFICE O/P EST LOW 20 MIN: CPT | Mod: PBBFAC | Performed by: OTOLARYNGOLOGY

## 2024-08-13 RX ORDER — CIPROFLOXACIN 500 MG/1
500 TABLET ORAL 2 TIMES DAILY
Qty: 20 TABLET | Refills: 0 | Status: SHIPPED | OUTPATIENT
Start: 2024-08-13

## 2024-08-13 RX ORDER — METHYLPREDNISOLONE 4 MG/1
TABLET ORAL
Qty: 1 EACH | Refills: 0 | Status: SHIPPED | OUTPATIENT
Start: 2024-08-13

## 2024-08-13 RX ORDER — CLINDAMYCIN HYDROCHLORIDE 300 MG/1
300 CAPSULE ORAL 2 TIMES DAILY WITH MEALS
Qty: 28 CAPSULE | Refills: 1 | Status: SHIPPED | OUTPATIENT
Start: 2024-08-13

## 2024-08-13 NOTE — PROGRESS NOTES
Subjective:       Patient ID: Felicia Keita is a 79 y.o. female.    Chief Complaint: Otalgia (Patient complains of having pain in her right ear for a week. States she has been using prescription ear gtts she got from her doctor but no relief.)    Otalgia   Associated symptoms include hearing loss.     Review of Systems   HENT:  Positive for ear pain and hearing loss.    All other systems reviewed and are negative.      Objective:      Physical Exam  General: NAD tender around right ear   Head: Normocephalic, atraumatic, no facial asymmetry/normal strength,  Ears: Both auricules normal in appearance, w/o deformities tympanic membranes right TM dull  external auditory canals irritated right   Nose: External nose w/o deformities normal turbinates no drainage or inflammation  Oral Cavity: Lips, gums, floor of mouth, tongue hard palate, and buccal mucosa without mass/lesion  Oropharynx: Mucosa pink and moist, soft palate, posterior pharynx and oropharyngeal wall without mass/lesion  Neck: Supple, symmetric, trachea midline, no palpable mass/lesion, no palpable cervical lymphadenopathy  Skin: Warm and dry, no concerning lesions  Respiratory: Respirations even, unlabored  Assessment:       1. Otitis externa of right ear, unspecified chronicity, unspecified type    2. Otalgia, unspecified laterality        Plan:       CSP medrol cleocin   F/u 2 weeks

## 2024-08-20 DIAGNOSIS — Z71.89 COMPLEX CARE COORDINATION: ICD-10-CM

## 2024-08-25 NOTE — ASSESSMENT & PLAN NOTE
Decadron 4mg injection given. No adverse reaction noted.   Instructed to RTC for any new/worsening/persisting ssx.

## 2024-08-25 NOTE — ASSESSMENT & PLAN NOTE
Pt had not been taking mirapex 3 times daily as prescribed, only taking at night. Instructed this is prescribed 3 times daily or she could take 2 tablets at night to see if this will help her better. Pt vu.   Instructed to RTC for any new/worsening/persisting ssx.

## 2024-09-03 DIAGNOSIS — J44.9 CHRONIC OBSTRUCTIVE PULMONARY DISEASE, UNSPECIFIED COPD TYPE: ICD-10-CM

## 2024-09-03 RX ORDER — ALBUTEROL SULFATE 90 UG/1
1-2 INHALANT RESPIRATORY (INHALATION) EVERY 4 HOURS PRN
Qty: 18 G | Refills: 3 | Status: SHIPPED | OUTPATIENT
Start: 2024-09-03

## 2024-09-03 RX ORDER — IPRATROPIUM BROMIDE AND ALBUTEROL SULFATE 2.5; .5 MG/3ML; MG/3ML
3 SOLUTION RESPIRATORY (INHALATION) EVERY 6 HOURS PRN
Qty: 100 ML | Refills: 4 | Status: SHIPPED | OUTPATIENT
Start: 2024-09-03

## 2024-09-04 DIAGNOSIS — M16.11 PRIMARY OSTEOARTHRITIS OF RIGHT HIP: ICD-10-CM

## 2024-09-04 DIAGNOSIS — H65.23 BILATERAL CHRONIC SEROUS OTITIS MEDIA: ICD-10-CM

## 2024-09-04 RX ORDER — IPRATROPIUM BROMIDE 42 UG/1
2 SPRAY, METERED NASAL 4 TIMES DAILY
Qty: 15 ML | Refills: 4 | Status: SHIPPED | OUTPATIENT
Start: 2024-09-04

## 2024-09-11 ENCOUNTER — CLINICAL SUPPORT (OUTPATIENT)
Dept: AUDIOLOGY | Facility: CLINIC | Age: 79
End: 2024-09-11
Payer: MEDICARE

## 2024-09-11 ENCOUNTER — OFFICE VISIT (OUTPATIENT)
Dept: OTOLARYNGOLOGY | Facility: CLINIC | Age: 79
End: 2024-09-11
Payer: MEDICARE

## 2024-09-11 VITALS — HEIGHT: 67 IN | WEIGHT: 197 LBS | BODY MASS INDEX: 30.92 KG/M2

## 2024-09-11 DIAGNOSIS — H90.3 SENSORINEURAL HEARING LOSS (SNHL) OF BOTH EARS: Primary | ICD-10-CM

## 2024-09-11 DIAGNOSIS — H91.93 BILATERAL HEARING LOSS, UNSPECIFIED HEARING LOSS TYPE: Primary | ICD-10-CM

## 2024-09-11 DIAGNOSIS — H90.3 SENSORY HEARING LOSS, BILATERAL: Primary | ICD-10-CM

## 2024-09-11 PROCEDURE — 99214 OFFICE O/P EST MOD 30 MIN: CPT | Mod: S$PBB,,, | Performed by: OTOLARYNGOLOGY

## 2024-09-11 PROCEDURE — 99999 PR PBB SHADOW E&M-EST. PATIENT-LVL II: CPT | Mod: PBBFAC,,, | Performed by: AUDIOLOGIST

## 2024-09-11 PROCEDURE — 92552 PURE TONE AUDIOMETRY AIR: CPT | Mod: PBBFAC | Performed by: AUDIOLOGIST

## 2024-09-11 PROCEDURE — 99212 OFFICE O/P EST SF 10 MIN: CPT | Mod: PBBFAC,25 | Performed by: OTOLARYNGOLOGY

## 2024-09-11 PROCEDURE — 1160F RVW MEDS BY RX/DR IN RCRD: CPT | Mod: CPTII,,, | Performed by: OTOLARYNGOLOGY

## 2024-09-11 PROCEDURE — 99999 PR PBB SHADOW E&M-EST. PATIENT-LVL II: CPT | Mod: PBBFAC,,,

## 2024-09-11 PROCEDURE — 1159F MED LIST DOCD IN RCRD: CPT | Mod: CPTII,,, | Performed by: OTOLARYNGOLOGY

## 2024-09-11 PROCEDURE — 99999 PR PBB SHADOW E&M-EST. PATIENT-LVL II: CPT | Mod: PBBFAC,,, | Performed by: OTOLARYNGOLOGY

## 2024-09-11 PROCEDURE — 99212 OFFICE O/P EST SF 10 MIN: CPT | Mod: PBBFAC,25

## 2024-09-11 PROCEDURE — 99212 OFFICE O/P EST SF 10 MIN: CPT | Mod: PBBFAC | Performed by: AUDIOLOGIST

## 2024-09-11 PROCEDURE — 99499 UNLISTED E&M SERVICE: CPT | Mod: S$PBB,,, | Performed by: OTOLARYNGOLOGY

## 2024-09-11 PROCEDURE — 99999PBSHW PR PBB SHADOW TECHNICAL ONLY FILED TO HB: Mod: PBBFAC,,,

## 2024-09-11 NOTE — PROGRESS NOTES
Subjective:       Patient ID: Felicia Keita is a 79 y.o. female.    Chief Complaint: No chief complaint on file.    HPI  Review of Systems   HENT:  Positive for hearing loss. Negative for ear pain.    All other systems reviewed and are negative.      Objective:      Physical Exam  General: NAD  Head: Normocephalic, atraumatic, no facial asymmetry/normal strength,  Ears: Both auricules normal in appearance, w/o deformities tympanic membranes dull ? Some old fluid  external auditory canals normal  Nose: External nose w/o deformities normal turbinates no drainage or inflammation  Oral Cavity: Lips, gums, floor of mouth, tongue hard palate, and buccal mucosa without mass/lesion  Oropharynx: Mucosa pink and moist, soft palate, posterior pharynx and oropharyngeal wall without mass/lesion  Neck: Supple, symmetric, trachea midline, no palpable mass/lesion, no palpable cervical lymphadenopathy  Skin: Warm and dry, no concerning lesions  Respiratory: Respirations even, unlabored  Assessment:       1. Sensorineural hearing loss (SNHL) of both ears        Plan:       Audio reviewed and interpreted [facility] rec hearing aids

## 2024-10-04 DIAGNOSIS — I10 ESSENTIAL HYPERTENSION: ICD-10-CM

## 2024-10-04 DIAGNOSIS — H65.23 BILATERAL CHRONIC SEROUS OTITIS MEDIA: ICD-10-CM

## 2024-10-04 DIAGNOSIS — M54.41 CHRONIC MIDLINE LOW BACK PAIN WITH RIGHT-SIDED SCIATICA: ICD-10-CM

## 2024-10-04 DIAGNOSIS — I82.592 CHRONIC DEEP VEIN THROMBOSIS (DVT) OF OTHER VEIN OF LEFT LOWER EXTREMITY: ICD-10-CM

## 2024-10-04 DIAGNOSIS — K21.9 GERD WITHOUT ESOPHAGITIS: ICD-10-CM

## 2024-10-04 DIAGNOSIS — M16.11 PRIMARY OSTEOARTHRITIS OF RIGHT HIP: ICD-10-CM

## 2024-10-04 DIAGNOSIS — G89.29 CHRONIC MIDLINE LOW BACK PAIN WITH RIGHT-SIDED SCIATICA: ICD-10-CM

## 2024-10-04 DIAGNOSIS — G25.81 RESTLESS LEGS: ICD-10-CM

## 2024-10-04 DIAGNOSIS — R60.9 EDEMA, UNSPECIFIED TYPE: ICD-10-CM

## 2024-10-04 DIAGNOSIS — J44.9 CHRONIC OBSTRUCTIVE PULMONARY DISEASE, UNSPECIFIED COPD TYPE: ICD-10-CM

## 2024-10-04 RX ORDER — PRIMIDONE 50 MG/1
TABLET ORAL
Qty: 450 TABLET | Refills: 1 | Status: SHIPPED | OUTPATIENT
Start: 2024-10-04

## 2024-10-04 RX ORDER — OMEPRAZOLE 20 MG/1
20 CAPSULE, DELAYED RELEASE ORAL DAILY
Qty: 90 CAPSULE | Refills: 1 | Status: SHIPPED | OUTPATIENT
Start: 2024-10-04

## 2024-10-04 RX ORDER — LOSARTAN POTASSIUM 100 MG/1
100 TABLET ORAL DAILY
Qty: 90 TABLET | Refills: 1 | Status: SHIPPED | OUTPATIENT
Start: 2024-10-04

## 2024-10-04 RX ORDER — FUROSEMIDE 20 MG/1
20 TABLET ORAL DAILY
Qty: 90 TABLET | Refills: 1 | Status: SHIPPED | OUTPATIENT
Start: 2024-10-04

## 2024-10-04 RX ORDER — PROPRANOLOL HYDROCHLORIDE 40 MG/1
40 TABLET ORAL 2 TIMES DAILY
Qty: 180 TABLET | Refills: 1 | Status: SHIPPED | OUTPATIENT
Start: 2024-10-04

## 2024-10-04 RX ORDER — CELECOXIB 200 MG/1
200 CAPSULE ORAL DAILY
Qty: 90 CAPSULE | Refills: 1 | Status: SHIPPED | OUTPATIENT
Start: 2024-10-04

## 2024-10-04 RX ORDER — IPRATROPIUM BROMIDE 42 UG/1
2 SPRAY, METERED NASAL 4 TIMES DAILY
Qty: 15 ML | Refills: 4 | Status: SHIPPED | OUTPATIENT
Start: 2024-10-04

## 2024-10-04 RX ORDER — IPRATROPIUM BROMIDE AND ALBUTEROL SULFATE 2.5; .5 MG/3ML; MG/3ML
3 SOLUTION RESPIRATORY (INHALATION) EVERY 6 HOURS PRN
Qty: 100 ML | Refills: 4 | Status: SHIPPED | OUTPATIENT
Start: 2024-10-04

## 2024-10-04 RX ORDER — PRAMIPEXOLE DIHYDROCHLORIDE 0.25 MG/1
0.25 TABLET ORAL 3 TIMES DAILY
Qty: 90 TABLET | Refills: 1 | Status: SHIPPED | OUTPATIENT
Start: 2024-10-04 | End: 2025-10-04

## 2024-10-17 ENCOUNTER — OFFICE VISIT (OUTPATIENT)
Dept: FAMILY MEDICINE | Facility: CLINIC | Age: 79
End: 2024-10-17
Payer: MEDICARE

## 2024-10-17 VITALS
WEIGHT: 195.63 LBS | OXYGEN SATURATION: 95 % | HEART RATE: 95 BPM | DIASTOLIC BLOOD PRESSURE: 75 MMHG | BODY MASS INDEX: 30.71 KG/M2 | SYSTOLIC BLOOD PRESSURE: 128 MMHG | TEMPERATURE: 99 F | HEIGHT: 67 IN | RESPIRATION RATE: 19 BRPM

## 2024-10-17 DIAGNOSIS — R05.9 COUGH, UNSPECIFIED TYPE: ICD-10-CM

## 2024-10-17 DIAGNOSIS — J04.0 ACUTE LARYNGITIS: Primary | ICD-10-CM

## 2024-10-17 DIAGNOSIS — J02.9 SORETHROAT: ICD-10-CM

## 2024-10-17 DIAGNOSIS — H92.01 RIGHT EAR PAIN: ICD-10-CM

## 2024-10-17 LAB
CTP QC/QA: YES
MOLECULAR STREP A: NEGATIVE
POC MOLECULAR INFLUENZA A AGN: NEGATIVE
POC MOLECULAR INFLUENZA B AGN: NEGATIVE
SARS-COV-2 RDRP RESP QL NAA+PROBE: NEGATIVE

## 2024-10-17 RX ORDER — TIZANIDINE 4 MG/1
4 TABLET ORAL EVERY 12 HOURS PRN
COMMUNITY
Start: 2024-10-10

## 2024-10-17 RX ORDER — AMOXICILLIN AND CLAVULANATE POTASSIUM 875; 125 MG/1; MG/1
1 TABLET, FILM COATED ORAL 2 TIMES DAILY
Qty: 20 TABLET | Refills: 0 | Status: SHIPPED | OUTPATIENT
Start: 2024-10-17 | End: 2024-10-27

## 2024-10-17 RX ORDER — METHYLPREDNISOLONE ACETATE 40 MG/ML
40 INJECTION, SUSPENSION INTRA-ARTICULAR; INTRALESIONAL; INTRAMUSCULAR; SOFT TISSUE
Status: COMPLETED | OUTPATIENT
Start: 2024-10-17 | End: 2024-10-17

## 2024-10-17 RX ORDER — DEXAMETHASONE SODIUM PHOSPHATE 4 MG/ML
4 INJECTION, SOLUTION INTRA-ARTICULAR; INTRALESIONAL; INTRAMUSCULAR; INTRAVENOUS; SOFT TISSUE
Status: COMPLETED | OUTPATIENT
Start: 2024-10-17 | End: 2024-10-17

## 2024-10-17 RX ORDER — CEFTRIAXONE 1 G/1
1 INJECTION, POWDER, FOR SOLUTION INTRAMUSCULAR; INTRAVENOUS
Status: COMPLETED | OUTPATIENT
Start: 2024-10-17 | End: 2024-10-17

## 2024-10-17 RX ADMIN — METHYLPREDNISOLONE ACETATE 40 MG: 40 INJECTION, SUSPENSION INTRA-ARTICULAR; INTRALESIONAL; INTRAMUSCULAR; SOFT TISSUE at 10:10

## 2024-10-17 RX ADMIN — CEFTRIAXONE 1 G: 1 INJECTION, POWDER, FOR SOLUTION INTRAMUSCULAR; INTRAVENOUS at 10:10

## 2024-10-17 RX ADMIN — DEXAMETHASONE SODIUM PHOSPHATE 4 MG: 4 INJECTION, SOLUTION INTRA-ARTICULAR; INTRALESIONAL; INTRAMUSCULAR; INTRAVENOUS; SOFT TISSUE at 10:10

## 2024-10-17 NOTE — PROGRESS NOTES
KEDAR Diaz   [facility]  [address]  [place] [address]     PATIENT NAME: Felicia Keita  : 1945  DATE: 10/17/24  MRN: [id number]      Billing Provider: KEDAR Diaz  Level of Service:   Patient PCP Information       Provider PCP Type    KEDAR Diaz General            Reason for Visit / Chief Complaint: Sore Throat (Pt is here w c/o sorethroat, hoarseness, cough and right ear pain. She denies any fever. She said this has been going on for a week. )   Health Maintenance Due   Topic Date Due    TETANUS VACCINE  Never done    RSV Vaccine (Age 60+ and Pregnant patients) (1 - 1-dose 75+ series) Never done    Influenza Vaccine (1) 2024    COVID-19 Vaccine ( - - season) Never done          Update PCP  Update Chief Complaint         History of Present Illness / Problem Focused Workflow     Felicia Keita presents to the clinic for sick visit. She has productive cough with green sputum, sore throat, right ear pain, hoarseness. Pt can only talk to day with a whisper. She does have a lot of trouble usually with her ears and has a lot of scarring. She states she has been putting drops in her right ear. She denies fever, headaches, runny nose, nasal congestion, abd pain, n/v/d, rash. She denies any other needs at this time. NAD noted.     Hemoglobin A1C   Date Value Ref Range Status   2023 5.5 4.5 - 6.6 % Final     Comment:       Normal:               <5.7%  Pre-Diabetic:       5.7% to 6.4%  Diabetic:             >6.4%  Diabetic Goal:     <7%   2022 5.6 4.5 - 6.6 % Final     Comment:       Normal:               <5.7%  Pre-Diabetic:       5.7% to 6.4%  Diabetic:             >6.4%  Diabetic Goal:     <7%        CMP  Sodium   Date Value Ref Range Status   2024 133 (L) 136 - 145 mmol/L Final     Potassium   Date Value Ref Range Status   2024 3.9 3.5 - 5.1 mmol/L Final     Chloride   Date Value Ref Range Status   2024 99 98 - 107 mmol/L Final     CO2    Date Value Ref Range Status   07/26/2024 28 21 - 32 mmol/L Final     Glucose   Date Value Ref Range Status   07/26/2024 92 74 - 106 mg/dL Final     BUN   Date Value Ref Range Status   07/26/2024 10 7 - 18 mg/dL Final     Creatinine   Date Value Ref Range Status   07/26/2024 1.39 (H) 0.55 - 1.02 mg/dL Final     Calcium   Date Value Ref Range Status   07/26/2024 8.9 8.5 - 10.1 mg/dL Final     Total Protein   Date Value Ref Range Status   07/26/2024 7.0 6.4 - 8.2 g/dL Final     Albumin   Date Value Ref Range Status   07/26/2024 3.9 3.5 - 5.0 g/dL Final     Bilirubin, Total   Date Value Ref Range Status   07/26/2024 0.2 >0.0 - 1.2 mg/dL Final     Alk Phos   Date Value Ref Range Status   07/26/2024 103 55 - 142 U/L Final     AST   Date Value Ref Range Status   07/26/2024 29 15 - 37 U/L Final     ALT   Date Value Ref Range Status   07/26/2024 36 13 - 56 U/L Final     Anion Gap   Date Value Ref Range Status   07/26/2024 10 7 - 16 mmol/L Final     eGFR   Date Value Ref Range Status   07/26/2024 38 (L) >=60 mL/min/1.73m2 Final        Lab Results   Component Value Date    WBC 5.47 07/26/2024    RBC 4.68 07/26/2024    HGB 12.8 07/26/2024    HCT 41.7 07/26/2024    MCV 89.1 07/26/2024    MCH 27.4 07/26/2024    MCHC 30.7 (L) 07/26/2024    RDW 14.8 (H) 07/26/2024     07/26/2024    MPV 11.4 07/26/2024    LYMPH 29.3 07/26/2024    LYMPH 1.60 07/26/2024    MONO 8.6 (H) 07/26/2024    EOS 0.25 07/26/2024    BASO 0.07 07/26/2024    EOSINOPHIL 4.6 (H) 07/26/2024    BASOPHIL 1.3 (H) 07/26/2024        Lab Results   Component Value Date    CHOL 157 07/26/2024    CHOL 168 04/25/2024    CHOL 171 01/25/2024     Lab Results   Component Value Date    HDL 96 (H) 07/26/2024    HDL 88 (H) 04/25/2024    HDL 88 (H) 01/25/2024     Lab Results   Component Value Date    LDLCALC 50 07/26/2024    LDLCALC 67 04/25/2024    LDLCALC 72 01/25/2024     Lab Results   Component Value Date    TRIG 54 07/26/2024    TRIG 63 04/25/2024    TRIG 57 01/25/2024      Lab Results   Component Value Date    CHOLHDL 1.6 07/26/2024    CHOLHDL 1.9 04/25/2024    CHOLHDL 1.9 01/25/2024        Wt Readings from Last 3 Encounters:   10/17/24 1003 88.7 kg (195 lb 9.6 oz)   09/11/24 1327 89.4 kg (197 lb)   08/13/24 1314 89.4 kg (197 lb)        BP Readings from Last 3 Encounters:   10/17/24 128/75   08/07/24 120/70   08/06/24 136/74        Review of Systems     Review of Systems   Constitutional: Negative.    HENT:  Positive for ear pain, sore throat and voice change.    Respiratory:  Positive for cough.    Cardiovascular: Negative.    Gastrointestinal: Negative.    Endocrine: Negative.    Genitourinary: Negative.    Musculoskeletal:  Positive for arthralgias.   Integumentary:  Negative.   Allergic/Immunologic: Negative.    Neurological: Negative.    Hematological: Negative.    Psychiatric/Behavioral: Negative.          Medical / Social / Family History     Past Medical History:   Diagnosis Date    Abnormal gait 05/22/2013    Actinic keratosis 08/12/2020    L forearm     Acute cough 03/30/2023    Allergic rhinitis 04/07/2020    Bilateral chronic serous otitis media 05/22/2024    Blepharophimosis 04/22/2014    senile    Cervical somatic dysfunction 08/15/2017    Cervicalgia 03/28/2019    Chronic peripheral venous hypertension 06/16/2015    Chronic serous otitis media, bilateral 09/05/2019    Chronic venous hypertension (idiopathic) without complications of unspecified lower extremity 08/15/2017    Conjunctival hemorrhage, right eye 11/02/2020    Deep venous thrombosis of lower extremity 09/17/2012    Degeneration of cervical intervertebral disc 10/21/2011    Degeneration of lumbar intervertebral disc 10/21/2011    Degenerative joint disease involving multiple joints 07/25/2011    Depressive disorder 07/25/2011    Essential hypertension 08/12/2020    Essential tremor 06/24/2014    GERD without esophagitis 08/15/2017    Headache 04/07/2020    Hyperlipidemia 05/08/2012    Insomnia 04/06/2016     Osteoarthritis 01/04/2017    Osteoporosis 11/07/2017    Otalgia, right ear 10/17/2016    Other cervical disc degeneration, unspecified cervical region 08/15/2017    Overflow incontinence 01/06/2020    Pain in right knee 01/06/2020    osteoarthritis    Pain in right leg 09/10/2020    Peripheral arterial occlusive disease 02/24/2015    Peripheral vascular disease, unspecified 08/06/2019    Peripheral venous insufficiency 09/29/2015    Personal history of PE (pulmonary embolism) 08/06/2019    Presence of vena cava filter 03/2015    Pulmonary embolus 03/10/2015    Pulmonary infarction 03/16/2012    Pure hypercholesterolemia 07/25/2011    Restless legs 05/22/2018    Right temporomandibular joint disorder, unspecified 08/18/2020    Rosacea 07/01/2015    Sciatica, right side 07/06/2016    Seborrheic keratosis 01/07/2021    Segmental and somatic dysfunction of thoracic region 03/28/2019    Senile osteoporosis 04/24/2017    Spasm of back muscles 07/25/2011    Stroke 09/25/2023    Trochanteric bursitis of right hip 01/06/2020    Unspecified urinary incontinence 02/07/2020    Vitamin D deficiency 10/27/2014       Past Surgical History:   Procedure Laterality Date    APPENDECTOMY      bone density  06/27/2019    Ajith/Abiel    BREAST SURGERY Left 2013    approximate    CARPAL TUNNEL RELEASE      BLI    LUMBAR LAMINECTOMY      prolia  03/12/2019    1mg    remove cataract Right     insert lens    TONSILLECTOMY      TOTAL ABDOMINAL HYSTERECTOMY      VAGINAL DELIVERY      vascular surgery procedure       Right SSV Laser Ablation performed by Dr. Carlo hernandez screen  05/24/2018       Social History  Ms.  reports that she has never smoked. She has never been exposed to tobacco smoke. She has never used smokeless tobacco. She reports that she does not drink alcohol and does not use drugs.    Family History  Ms.'s family history includes COPD in her daughter and sister; Cancer in her brother; Emphysema in her father; Hearing  loss in her father; Hypertension in her father and sister; Melanoma in her brother; Rheum arthritis in her daughter and sister; Skin cancer in her father; Stomach cancer in her sister.    Medications and Allergies     Medications  Outpatient Medications Marked as Taking for the 10/17/24 encounter (Office Visit) with Demi Harris FNP   Medication Sig Dispense Refill    albuterol (PROVENTIL/VENTOLIN HFA) 90 mcg/actuation inhaler Inhale 1-2 puffs into the lungs every 4 (four) hours as needed for Wheezing. Rescue 18 g 3    albuterol-ipratropium (DUO-NEB) 2.5 mg-0.5 mg/3 mL nebulizer solution Take 3 mLs by nebulization every 6 (six) hours as needed for Wheezing. Rescue 100 mL 4    amLODIPine (NORVASC) 5 MG tablet Take 1 tablet (5 mg total) by mouth 2 (two) times daily. 180 tablet 1    ascorbic acid, vitamin C, (VITAMIN C) 500 MG tablet Take 500 mg by mouth once daily.      atorvastatin (LIPITOR) 80 MG tablet Take 1 tablet (80 mg total) by mouth once daily. 90 tablet 1    calcium carbonate-vitamin D3 1,000 mg-20 mcg (800 unit) Tab Take 1 each by mouth Daily. 90 tablet 1    celecoxib (CELEBREX) 200 MG capsule Take 1 capsule (200 mg total) by mouth once daily. 90 capsule 1    cyanocobalamin/folic acid (VITAMIN B12-FOLIC ACID) 500-400 mcg Tab take 1 tablet by oral route daily Oral 1      denosumab (PROLIA) 60 mg/mL Syrg as directed Subcutaneous      desvenlafaxine succinate (PRISTIQ) 100 MG Tb24 Take 1 tablet (100 mg total) by mouth once daily. 90 tablet 1    diaper,brief,adult,disposable Misc 1 each by Misc.(Non-Drug; Combo Route) route continuous prn (prn). 120 each 5    diclofenac sodium (VOLTAREN) 1 % Gel Apply 2 g topically once daily. 2 g 3    furosemide (LASIX) 20 MG tablet Take 1 tablet (20 mg total) by mouth once daily. 90 tablet 1    gabapentin (NEURONTIN) 300 MG capsule TAKE 1 CAPSULE BY MOUTH IN THE MORNING, TAKE 1 CAPSULE at LUNCH, AND TAKE 2 CAPSULES AT BEDTIME 380 capsule 1    HYDROcodone-acetaminophen  (NORCO)  mg per tablet 05/27/21 TAKE 1 TABLET BY MOUTH EVERY 8 HOURS AS NEEDED      ipratropium (ATROVENT) 42 mcg (0.06 %) nasal spray 2 sprays by Each Nostril route 4 (four) times daily. 15 mL 4    losartan (COZAAR) 100 MG tablet Take 1 tablet (100 mg total) by mouth once daily. 90 tablet 1    omeprazole (PRILOSEC) 20 MG capsule Take 1 capsule (20 mg total) by mouth once daily. 90 capsule 1    pramipexole (MIRAPEX) 0.25 MG tablet Take 1 tablet (0.25 mg total) by mouth 3 (three) times daily. 90 tablet 1    pregabalin (LYRICA) 50 MG capsule Take 1 capsule (50 mg total) by mouth 2 (two) times daily. 60 capsule 5    primidone (MYSOLINE) 50 MG Tab TAKE 2 TABLETS BY MOUTH EVERY MORNING AND TAKE 3 TABLETS BY MOUTH EVERY EVENING 450 tablet 1    propranoloL (INDERAL) 40 MG tablet Take 1 tablet (40 mg total) by mouth 2 (two) times daily. 180 tablet 1    rivaroxaban (XARELTO) 20 mg Tab Take 1 tablet (20 mg total) by mouth once daily. 90 tablet 1    senna-docusate 8.6-50 mg (PERICOLACE) 8.6-50 mg per tablet Take 1 tablet by mouth once daily.      tiZANidine (ZANAFLEX) 4 MG tablet Take 4 mg by mouth every 12 (twelve) hours as needed.      zinc gluconate 50 mg tablet Take 50 mg by mouth once daily.       Current Facility-Administered Medications for the 10/17/24 encounter (Office Visit) with Demi Harris FNP   Medication Dose Route Frequency Provider Last Rate Last Admin    [COMPLETED] cefTRIAXone injection 1 g  1 g Intramuscular 1 time in Clinic/HOD Demi Harris FNP   1 g at 10/17/24 1003    [COMPLETED] dexAMETHasone injection 4 mg  4 mg Intramuscular 1 time in Clinic/HOD Demi Harris FNP   4 mg at 10/17/24 1003    [COMPLETED] methylPREDNISolone acetate injection 40 mg  40 mg Intramuscular 1 time in Clinic/HOD Demi Harris FNP   40 mg at 10/17/24 1003       Allergies  Review of patient's allergies indicates:  No Known Allergies    Physical Examination     Vitals:    10/17/24 1003   BP: 128/75   BP Location: Right arm  "  Patient Position: Sitting   Pulse: 95   Resp: 19   Temp: 98.5 °F (36.9 °C)   TempSrc: Oral   SpO2: 95%   Weight: 88.7 kg (195 lb 9.6 oz)   Height: 5' 7" (1.702 m)      Physical Exam  Constitutional:       Appearance: Normal appearance. She is obese.   HENT:      Head: Normocephalic.      Right Ear: Ear canal and external ear normal. Decreased hearing noted. A middle ear effusion is present.      Left Ear: Ear canal and external ear normal. Decreased hearing noted. A middle ear effusion is present.      Nose: Nose normal.      Mouth/Throat:      Mouth: Mucous membranes are moist.      Pharynx: Posterior oropharyngeal erythema present.   Eyes:      Extraocular Movements: Extraocular movements intact.      Pupils: Pupils are equal, round, and reactive to light.   Cardiovascular:      Rate and Rhythm: Normal rate and regular rhythm.      Pulses: Normal pulses.      Heart sounds: Normal heart sounds.   Pulmonary:      Effort: Pulmonary effort is normal.      Breath sounds: Normal breath sounds.   Abdominal:      General: Abdomen is flat. Bowel sounds are normal.      Palpations: Abdomen is soft.   Lymphadenopathy:      Cervical: Cervical adenopathy present.      Left cervical: Superficial cervical adenopathy present.   Skin:     General: Skin is warm and dry.      Capillary Refill: Capillary refill takes less than 2 seconds.   Neurological:      General: No focal deficit present.      Mental Status: She is alert and oriented to person, place, and time.   Psychiatric:         Mood and Affect: Mood normal.         Behavior: Behavior normal.          Assessment and Plan (including Health Maintenance)      Problem List  Smart Sets  Document Outside HM   :    Plan:     There are no Patient Instructions on file for this visit.       Health Maintenance Due   Topic Date Due    TETANUS VACCINE  Never done    RSV Vaccine (Age 60+ and Pregnant patients) (1 - 1-dose 75+ series) Never done    Influenza Vaccine (1) 09/01/2024    " COVID-19 Vaccine (1 - 2024-25 season) Never done       Problem List Items Addressed This Visit       Acute laryngitis - Primary    Current Assessment & Plan     Flu, strep, covid negative.   Rocephin 1G/Decadron 4mg/Depo Medrol 40mg injections given. No adverse reaction noted.   Augmentin prescribed to take as directed. Instructed to take all abx until gone even if start to feel better.    Instructed may alternate Tylenol/Motrin for pain/fever if not contraindicated.    Increase water intake.   Instructed to RTC or go to ER for any new/worsening/persisting ssx.           Relevant Medications    cefTRIAXone injection 1 g (Completed) (Start on 10/17/2024 10:15 AM)    dexAMETHasone injection 4 mg (Completed) (Start on 10/17/2024 10:15 AM)    methylPREDNISolone acetate injection 40 mg (Completed) (Start on 10/17/2024 10:15 AM)    amoxicillin-clavulanate 875-125mg (AUGMENTIN) 875-125 mg per tablet    Cough    Current Assessment & Plan     See above plan.          Relevant Orders    POCT Strep A, Molecular (Completed)    POCT Influenza A/B Molecular (Completed)    POCT COVID-19 Rapid Screening (Completed)    Right ear pain    Current Assessment & Plan     See above plan. Followed by Dr. Hernandez.          Relevant Orders    POCT Influenza A/B Molecular (Completed)    POCT COVID-19 Rapid Screening (Completed)    Sorethroat    Current Assessment & Plan     RST negative.   Instructed may alternate Tylenol/Motrin for pain/fever if not contraindicated.    Instructed to RTC for any new/worsening/persisting ssx.           Relevant Orders    POCT Strep A, Molecular (Completed)    POCT Influenza A/B Molecular (Completed)    POCT COVID-19 Rapid Screening (Completed)     Acute laryngitis  -     cefTRIAXone injection 1 g  -     dexAMETHasone injection 4 mg  -     methylPREDNISolone acetate injection 40 mg  -     amoxicillin-clavulanate 875-125mg (AUGMENTIN) 875-125 mg per tablet; Take 1 tablet by mouth 2 (two) times daily. for 10 days   Dispense: 20 tablet; Refill: 0    Right ear pain  -     POCT Influenza A/B Molecular  -     POCT COVID-19 Rapid Screening    Sorethroat  -     POCT Strep A, Molecular  -     POCT Influenza A/B Molecular  -     POCT COVID-19 Rapid Screening    Cough, unspecified type  -     POCT Strep A, Molecular  -     POCT Influenza A/B Molecular  -     POCT COVID-19 Rapid Screening       Health Maintenance Topics with due status: Not Due       Topic Last Completion Date    DEXA Scan 07/27/2023    Lipid Panel 07/26/2024         Future Appointments   Date Time Provider Department Center   10/28/2024 11:00 AM Demi Harris FNP [facility] [place]   12/30/2024 10:00 AM INFUSION, [facility] [place]    8/11/2025  8:00 AM AWV NURSE, [facility] FAMILY MEDICINE [facility] [place]   9/15/2025  2:00 PM Ibis Devi OB AUDIO [facility]        No follow-ups on file.    Health Maintenance Due   Topic Date Due    TETANUS VACCINE  Never done    RSV Vaccine (Age 60+ and Pregnant patients) (1 - 1-dose 75+ series) Never done    Influenza Vaccine (1) 09/01/2024    COVID-19 Vaccine (1 - 2024-25 season) Never done        Signature:  KEDAR Diaz    Date of encounter: 10/17/24

## 2024-10-17 NOTE — ASSESSMENT & PLAN NOTE
Flu, strep, covid negative.   Rocephin 1G/Decadron 4mg/Depo Medrol 40mg injections given. No adverse reaction noted.   Augmentin prescribed to take as directed. Instructed to take all abx until gone even if start to feel better.    Instructed may alternate Tylenol/Motrin for pain/fever if not contraindicated.    Increase water intake.   Instructed to RTC or go to ER for any new/worsening/persisting ssx.     Pt aware of Magdalena's response.

## 2024-10-17 NOTE — ASSESSMENT & PLAN NOTE
RST negative.   Instructed may alternate Tylenol/Motrin for pain/fever if not contraindicated.    Instructed to RTC for any new/worsening/persisting ssx.

## 2024-11-12 ENCOUNTER — TELEPHONE (OUTPATIENT)
Dept: FAMILY MEDICINE | Facility: CLINIC | Age: 79
End: 2024-11-12
Payer: MEDICARE

## 2024-11-12 NOTE — TELEPHONE ENCOUNTER
Pt called and said that the pain clinic told her that she couldn't take gabapentin and lyrica at the same time. I told pt we originally thought she had discontinued the gabapentin long ago. I asked pt if she had stopped taking the lyrica since they said not to mix she said no that they worked for her.

## 2024-12-05 DIAGNOSIS — H65.23 BILATERAL CHRONIC SEROUS OTITIS MEDIA: ICD-10-CM

## 2024-12-05 DIAGNOSIS — M16.0 PRIMARY OSTEOARTHRITIS OF BOTH HIPS: ICD-10-CM

## 2024-12-05 DIAGNOSIS — M16.11 PRIMARY OSTEOARTHRITIS OF RIGHT HIP: ICD-10-CM

## 2024-12-05 RX ORDER — IPRATROPIUM BROMIDE 42 UG/1
2 SPRAY, METERED NASAL 4 TIMES DAILY
Qty: 15 ML | Refills: 4 | Status: SHIPPED | OUTPATIENT
Start: 2024-12-05

## 2024-12-05 RX ORDER — DICLOFENAC SODIUM 10 MG/G
2 GEL TOPICAL DAILY
Qty: 2 G | Refills: 3 | Status: SHIPPED | OUTPATIENT
Start: 2024-12-05

## 2024-12-10 ENCOUNTER — OFFICE VISIT (OUTPATIENT)
Dept: FAMILY MEDICINE | Facility: CLINIC | Age: 79
End: 2024-12-10
Payer: MEDICARE

## 2024-12-10 VITALS
BODY MASS INDEX: 30.04 KG/M2 | SYSTOLIC BLOOD PRESSURE: 139 MMHG | TEMPERATURE: 99 F | WEIGHT: 191.38 LBS | HEART RATE: 78 BPM | OXYGEN SATURATION: 96 % | HEIGHT: 67 IN | RESPIRATION RATE: 20 BRPM | DIASTOLIC BLOOD PRESSURE: 65 MMHG

## 2024-12-10 DIAGNOSIS — J44.9 CHRONIC OBSTRUCTIVE PULMONARY DISEASE, UNSPECIFIED COPD TYPE: ICD-10-CM

## 2024-12-10 DIAGNOSIS — J04.0 ACUTE LARYNGITIS: Primary | ICD-10-CM

## 2024-12-10 DIAGNOSIS — G25.81 RESTLESS LEGS: ICD-10-CM

## 2024-12-10 DIAGNOSIS — R05.9 COUGH, UNSPECIFIED TYPE: ICD-10-CM

## 2024-12-10 LAB
CTP QC/QA: YES
CTP QC/QA: YES
POC MOLECULAR INFLUENZA A AGN: NEGATIVE
POC MOLECULAR INFLUENZA B AGN: NEGATIVE
SARS-COV-2 RDRP RESP QL NAA+PROBE: NEGATIVE

## 2024-12-10 PROCEDURE — 99213 OFFICE O/P EST LOW 20 MIN: CPT | Mod: 25,,, | Performed by: NURSE PRACTITIONER

## 2024-12-10 PROCEDURE — 3078F DIAST BP <80 MM HG: CPT | Mod: ,,, | Performed by: NURSE PRACTITIONER

## 2024-12-10 PROCEDURE — 1160F RVW MEDS BY RX/DR IN RCRD: CPT | Mod: ,,, | Performed by: NURSE PRACTITIONER

## 2024-12-10 PROCEDURE — 1159F MED LIST DOCD IN RCRD: CPT | Mod: ,,, | Performed by: NURSE PRACTITIONER

## 2024-12-10 PROCEDURE — 1101F PT FALLS ASSESS-DOCD LE1/YR: CPT | Mod: ,,, | Performed by: NURSE PRACTITIONER

## 2024-12-10 PROCEDURE — 96372 THER/PROPH/DIAG INJ SC/IM: CPT | Mod: ,,, | Performed by: NURSE PRACTITIONER

## 2024-12-10 PROCEDURE — 87502 INFLUENZA DNA AMP PROBE: CPT | Mod: QW,,, | Performed by: NURSE PRACTITIONER

## 2024-12-10 PROCEDURE — 1126F AMNT PAIN NOTED NONE PRSNT: CPT | Mod: ,,, | Performed by: NURSE PRACTITIONER

## 2024-12-10 PROCEDURE — 3288F FALL RISK ASSESSMENT DOCD: CPT | Mod: ,,, | Performed by: NURSE PRACTITIONER

## 2024-12-10 PROCEDURE — 87635 SARS-COV-2 COVID-19 AMP PRB: CPT | Mod: QW,,, | Performed by: NURSE PRACTITIONER

## 2024-12-10 PROCEDURE — 3075F SYST BP GE 130 - 139MM HG: CPT | Mod: ,,, | Performed by: NURSE PRACTITIONER

## 2024-12-10 RX ORDER — METHYLPREDNISOLONE ACETATE 40 MG/ML
40 INJECTION, SUSPENSION INTRA-ARTICULAR; INTRALESIONAL; INTRAMUSCULAR; SOFT TISSUE
Status: COMPLETED | OUTPATIENT
Start: 2024-12-10 | End: 2024-12-10

## 2024-12-10 RX ORDER — AZITHROMYCIN 250 MG/1
TABLET, FILM COATED ORAL
Qty: 6 TABLET | Refills: 0 | Status: SHIPPED | OUTPATIENT
Start: 2024-12-10 | End: 2024-12-15

## 2024-12-10 RX ORDER — ALBUTEROL SULFATE 90 UG/1
1-2 INHALANT RESPIRATORY (INHALATION) EVERY 4 HOURS PRN
Qty: 18 G | Refills: 3 | Status: SHIPPED | OUTPATIENT
Start: 2024-12-10

## 2024-12-10 RX ORDER — DEXAMETHASONE SODIUM PHOSPHATE 4 MG/ML
4 INJECTION, SOLUTION INTRA-ARTICULAR; INTRALESIONAL; INTRAMUSCULAR; INTRAVENOUS; SOFT TISSUE
Status: COMPLETED | OUTPATIENT
Start: 2024-12-10 | End: 2024-12-10

## 2024-12-10 RX ORDER — CEFTRIAXONE 1 G/1
1 INJECTION, POWDER, FOR SOLUTION INTRAMUSCULAR; INTRAVENOUS
Status: COMPLETED | OUTPATIENT
Start: 2024-12-10 | End: 2024-12-10

## 2024-12-10 RX ORDER — ALBUTEROL SULFATE 90 UG/1
1-2 INHALANT RESPIRATORY (INHALATION) EVERY 4 HOURS PRN
Qty: 18 G | Refills: 3 | Status: CANCELLED | OUTPATIENT
Start: 2024-12-10

## 2024-12-10 RX ADMIN — DEXAMETHASONE SODIUM PHOSPHATE 4 MG: 4 INJECTION, SOLUTION INTRA-ARTICULAR; INTRALESIONAL; INTRAMUSCULAR; INTRAVENOUS; SOFT TISSUE at 02:12

## 2024-12-10 RX ADMIN — CEFTRIAXONE 1 G: 1 INJECTION, POWDER, FOR SOLUTION INTRAMUSCULAR; INTRAVENOUS at 02:12

## 2024-12-10 RX ADMIN — METHYLPREDNISOLONE ACETATE 40 MG: 40 INJECTION, SUSPENSION INTRA-ARTICULAR; INTRALESIONAL; INTRAMUSCULAR; SOFT TISSUE at 02:12

## 2024-12-10 NOTE — PROGRESS NOTES
KEDAR Diaz   [facility]  [address]  [place] [address]     PATIENT NAME: Felicia Keita  : 1945  DATE: 12/10/24  MRN: [id number]      Billing Provider: KEDAR Diaz  Level of Service:   Patient PCP Information       Provider PCP Type    KEDAR Diaz General            Reason for Visit / Chief Complaint: Cough (Started 2 weeks ago ) and Sinus Problem   Health Maintenance Due   Topic Date Due    TETANUS VACCINE  Never done    RSV Vaccine (Age 60+ and Pregnant patients) (1 - 1-dose 75+ series) Never done    Influenza Vaccine (1) 2024    COVID-19 Vaccine ( - - season) Never done          Update PCP  Update Chief Complaint         History of Present Illness / Problem Focused Workflow     History of Present Illness    CHIEF COMPLAINT:  Patient presents today for persistent respiratory symptoms. She also needs refill on inhaler.     RESPIRATORY SYMPTOMS:  She reports a productive cough with yellow-green sputum, sore throat, and shortness of breath. She describes expectorating large amounts of mucus and experienced an episode of dyspnea the previous night. She also reports a low-grade fever. She has been using cough drops for symptom relief but denies trying any cough syrup. She indicates needing a refill for her inhaler.    EAR CONCERNS:  She reports hearing difficulty, stating she cannot hear thunder. She mentions having hearing aids fixed. Examination reveals fluid in the ears, with one ear appearing more affected than the other.    MEDICATIONS AND ALLERGIES:  She is currently using cough drops for cough management. She reports being unable to take ibuprofen.      ROS:  General: +fever, -chills, -fatigue, -weight gain, -weight loss  Eyes: -vision changes, -redness, -discharge  ENT: -ear pain, -nasal congestion, -sore throat, +hearing loss  Cardiovascular: -chest pain, -palpitations, -lower extremity edema  Respiratory: +cough, +shortness of breath  Gastrointestinal:  -abdominal pain, -nausea, -vomiting, -diarrhea, -constipation, -blood in stool  Genitourinary: -dysuria, -hematuria, -frequency  Musculoskeletal: -joint pain, -muscle pain  Skin: -rash, -lesion  Neurological: -headache, -dizziness, -numbness, -tingling  Psychiatric: -anxiety, -depression, -sleep difficulty          Hemoglobin A1C   Date Value Ref Range Status   09/26/2023 5.5 4.5 - 6.6 % Final     Comment:       Normal:               <5.7%  Pre-Diabetic:       5.7% to 6.4%  Diabetic:             >6.4%  Diabetic Goal:     <7%   07/20/2022 5.6 4.5 - 6.6 % Final     Comment:       Normal:               <5.7%  Pre-Diabetic:       5.7% to 6.4%  Diabetic:             >6.4%  Diabetic Goal:     <7%        CMP  Sodium   Date Value Ref Range Status   07/26/2024 133 (L) 136 - 145 mmol/L Final     Potassium   Date Value Ref Range Status   07/26/2024 3.9 3.5 - 5.1 mmol/L Final     Chloride   Date Value Ref Range Status   07/26/2024 99 98 - 107 mmol/L Final     CO2   Date Value Ref Range Status   07/26/2024 28 21 - 32 mmol/L Final     Glucose   Date Value Ref Range Status   07/26/2024 92 74 - 106 mg/dL Final     BUN   Date Value Ref Range Status   07/26/2024 10 7 - 18 mg/dL Final     Creatinine   Date Value Ref Range Status   07/26/2024 1.39 (H) 0.55 - 1.02 mg/dL Final     Calcium   Date Value Ref Range Status   07/26/2024 8.9 8.5 - 10.1 mg/dL Final     Total Protein   Date Value Ref Range Status   07/26/2024 7.0 6.4 - 8.2 g/dL Final     Albumin   Date Value Ref Range Status   07/26/2024 3.9 3.5 - 5.0 g/dL Final     Bilirubin, Total   Date Value Ref Range Status   07/26/2024 0.2 >0.0 - 1.2 mg/dL Final     Alk Phos   Date Value Ref Range Status   07/26/2024 103 55 - 142 U/L Final     AST   Date Value Ref Range Status   07/26/2024 29 15 - 37 U/L Final     ALT   Date Value Ref Range Status   07/26/2024 36 13 - 56 U/L Final     Anion Gap   Date Value Ref Range Status   07/26/2024 10 7 - 16 mmol/L Final     eGFR   Date Value Ref Range  Status   07/26/2024 38 (L) >=60 mL/min/1.73m2 Final        Lab Results   Component Value Date    WBC 5.47 07/26/2024    RBC 4.68 07/26/2024    HGB 12.8 07/26/2024    HCT 41.7 07/26/2024    MCV 89.1 07/26/2024    MCH 27.4 07/26/2024    MCHC 30.7 (L) 07/26/2024    RDW 14.8 (H) 07/26/2024     07/26/2024    MPV 11.4 07/26/2024    LYMPH 29.3 07/26/2024    LYMPH 1.60 07/26/2024    MONO 8.6 (H) 07/26/2024    EOS 0.25 07/26/2024    BASO 0.07 07/26/2024    EOSINOPHIL 4.6 (H) 07/26/2024    BASOPHIL 1.3 (H) 07/26/2024        Lab Results   Component Value Date    CHOL 157 07/26/2024    CHOL 168 04/25/2024    CHOL 171 01/25/2024     Lab Results   Component Value Date    HDL 96 (H) 07/26/2024    HDL 88 (H) 04/25/2024    HDL 88 (H) 01/25/2024     Lab Results   Component Value Date    LDLCALC 50 07/26/2024    LDLCALC 67 04/25/2024    LDLCALC 72 01/25/2024     Lab Results   Component Value Date    TRIG 54 07/26/2024    TRIG 63 04/25/2024    TRIG 57 01/25/2024     Lab Results   Component Value Date    CHOLHDL 1.6 07/26/2024    CHOLHDL 1.9 04/25/2024    CHOLHDL 1.9 01/25/2024        Wt Readings from Last 3 Encounters:   12/10/24 1323 86.8 kg (191 lb 6.4 oz)   10/17/24 1003 88.7 kg (195 lb 9.6 oz)   09/11/24 1327 89.4 kg (197 lb)        BP Readings from Last 3 Encounters:   12/10/24 139/65   10/17/24 128/75   08/07/24 120/70        Review of Systems     Review of Systems       Medical / Social / Family History     Past Medical History:   Diagnosis Date    Abnormal gait 05/22/2013    Actinic keratosis 08/12/2020    L forearm     Acute cough 03/30/2023    Allergic rhinitis 04/07/2020    Bilateral chronic serous otitis media 05/22/2024    Blepharophimosis 04/22/2014    senile    Cervical somatic dysfunction 08/15/2017    Cervicalgia 03/28/2019    Chronic peripheral venous hypertension 06/16/2015    Chronic serous otitis media, bilateral 09/05/2019    Chronic venous hypertension (idiopathic) without complications of unspecified  lower extremity 08/15/2017    Conjunctival hemorrhage, right eye 11/02/2020    Deep venous thrombosis of lower extremity 09/17/2012    Degeneration of cervical intervertebral disc 10/21/2011    Degeneration of lumbar intervertebral disc 10/21/2011    Degenerative joint disease involving multiple joints 07/25/2011    Depressive disorder 07/25/2011    Essential hypertension 08/12/2020    Essential tremor 06/24/2014    GERD without esophagitis 08/15/2017    Headache 04/07/2020    Hyperlipidemia 05/08/2012    Insomnia 04/06/2016    Osteoarthritis 01/04/2017    Osteoporosis 11/07/2017    Otalgia, right ear 10/17/2016    Other cervical disc degeneration, unspecified cervical region 08/15/2017    Overflow incontinence 01/06/2020    Pain in right knee 01/06/2020    osteoarthritis    Pain in right leg 09/10/2020    Peripheral arterial occlusive disease 02/24/2015    Peripheral vascular disease, unspecified 08/06/2019    Peripheral venous insufficiency 09/29/2015    Personal history of PE (pulmonary embolism) 08/06/2019    Presence of vena cava filter 03/2015    Pulmonary embolus 03/10/2015    Pulmonary infarction 03/16/2012    Pure hypercholesterolemia 07/25/2011    Restless legs 05/22/2018    Right temporomandibular joint disorder, unspecified 08/18/2020    Rosacea 07/01/2015    Sciatica, right side 07/06/2016    Seborrheic keratosis 01/07/2021    Segmental and somatic dysfunction of thoracic region 03/28/2019    Senile osteoporosis 04/24/2017    Spasm of back muscles 07/25/2011    Stroke 09/25/2023    Trochanteric bursitis of right hip 01/06/2020    Unspecified urinary incontinence 02/07/2020    Vitamin D deficiency 10/27/2014       Past Surgical History:   Procedure Laterality Date    APPENDECTOMY      bone density  06/27/2019    Ajith/Abiel    BREAST SURGERY Left 2013    approximate    CARPAL TUNNEL RELEASE      BLI    LUMBAR LAMINECTOMY      prolia  03/12/2019    1mg    remove cataract Right     insert lens     TONSILLECTOMY      TOTAL ABDOMINAL HYSTERECTOMY      VAGINAL DELIVERY      vascular surgery procedure       Right SSV Laser Ablation performed by Dr. Carlo hernandez screen  05/24/2018       Social History  Ms.  reports that she has never smoked. She has never been exposed to tobacco smoke. She has never used smokeless tobacco. She reports that she does not drink alcohol and does not use drugs.    Family History  Ms.'s family history includes COPD in her daughter and sister; Cancer in her brother; Emphysema in her father; Hearing loss in her father; Hypertension in her father and sister; Melanoma in her brother; Rheum arthritis in her daughter and sister; Skin cancer in her father; Stomach cancer in her sister.    Medications and Allergies     Medications  Outpatient Medications Marked as Taking for the 12/10/24 encounter (Office Visit) with Demi Harris FNP   Medication Sig Dispense Refill    albuterol-ipratropium (DUO-NEB) 2.5 mg-0.5 mg/3 mL nebulizer solution Take 3 mLs by nebulization every 6 (six) hours as needed for Wheezing. Rescue 100 mL 4    amLODIPine (NORVASC) 5 MG tablet Take 1 tablet (5 mg total) by mouth 2 (two) times daily. 180 tablet 1    ascorbic acid, vitamin C, (VITAMIN C) 500 MG tablet Take 500 mg by mouth once daily.      atorvastatin (LIPITOR) 80 MG tablet Take 1 tablet (80 mg total) by mouth once daily. 90 tablet 1    calcium carbonate-vitamin D3 1,000 mg-20 mcg (800 unit) Tab Take 1 each by mouth Daily. 90 tablet 1    celecoxib (CELEBREX) 200 MG capsule Take 1 capsule (200 mg total) by mouth once daily. 90 capsule 1    cyanocobalamin/folic acid (VITAMIN B12-FOLIC ACID) 500-400 mcg Tab take 1 tablet by oral route daily Oral 1      denosumab (PROLIA) 60 mg/mL Syrg as directed Subcutaneous      desvenlafaxine succinate (PRISTIQ) 100 MG Tb24 Take 1 tablet (100 mg total) by mouth once daily. 90 tablet 1    diaper,brief,adult,disposable Misc 1 each by Misc.(Non-Drug; Combo Route) route  continuous prn (prn). 120 each 5    diclofenac sodium (VOLTAREN) 1 % Gel Apply 2 g topically once daily. 2 g 3    furosemide (LASIX) 20 MG tablet Take 1 tablet (20 mg total) by mouth once daily. 90 tablet 1    gabapentin (NEURONTIN) 300 MG capsule TAKE 1 CAPSULE BY MOUTH IN THE MORNING, TAKE 1 CAPSULE at LUNCH, AND TAKE 2 CAPSULES AT BEDTIME 380 capsule 1    HYDROcodone-acetaminophen (NORCO)  mg per tablet 05/27/21 TAKE 1 TABLET BY MOUTH EVERY 8 HOURS AS NEEDED      ipratropium (ATROVENT) 42 mcg (0.06 %) nasal spray 2 sprays by Each Nostril route 4 (four) times daily. 15 mL 4    losartan (COZAAR) 100 MG tablet Take 1 tablet (100 mg total) by mouth once daily. 90 tablet 1    omeprazole (PRILOSEC) 20 MG capsule Take 1 capsule (20 mg total) by mouth once daily. 90 capsule 1    pramipexole (MIRAPEX) 0.25 MG tablet Take 1 tablet (0.25 mg total) by mouth 3 (three) times daily. 90 tablet 1    pregabalin (LYRICA) 50 MG capsule Take 1 capsule (50 mg total) by mouth 2 (two) times daily. 60 capsule 5    primidone (MYSOLINE) 50 MG Tab TAKE 2 TABLETS BY MOUTH EVERY MORNING AND TAKE 3 TABLETS BY MOUTH EVERY EVENING 450 tablet 1    propranoloL (INDERAL) 40 MG tablet Take 1 tablet (40 mg total) by mouth 2 (two) times daily. 180 tablet 1    rivaroxaban (XARELTO) 20 mg Tab Take 1 tablet (20 mg total) by mouth once daily. 90 tablet 1    senna-docusate 8.6-50 mg (PERICOLACE) 8.6-50 mg per tablet Take 1 tablet by mouth once daily.      tiZANidine (ZANAFLEX) 4 MG tablet Take 4 mg by mouth every 12 (twelve) hours as needed.      zinc gluconate 50 mg tablet Take 50 mg by mouth once daily.      [DISCONTINUED] albuterol (PROVENTIL/VENTOLIN HFA) 90 mcg/actuation inhaler Inhale 1-2 puffs into the lungs every 4 (four) hours as needed for Wheezing. Rescue 18 g 3     Current Facility-Administered Medications for the 12/10/24 encounter (Office Visit) with Harris, Demi, FNP   Medication Dose Route Frequency Provider Last Rate Last Admin     "[COMPLETED] cefTRIAXone injection 1 g  1 g Intramuscular 1 time in Clinic/HOD Demi Harris FNP   1 g at 12/10/24 1416    [COMPLETED] dexAMETHasone injection 4 mg  4 mg Intramuscular 1 time in Clinic/HOD Demi Harris FNP   4 mg at 12/10/24 1416    [COMPLETED] methylPREDNISolone acetate injection 40 mg  40 mg Intramuscular 1 time in Clinic/HOD Demi Harris FNP   40 mg at 12/10/24 1417       Allergies  Review of patient's allergies indicates:  No Known Allergies    Physical Examination     Vitals:    12/10/24 1323   BP: 139/65   BP Location: Left arm   Patient Position: Sitting   Pulse: 78   Resp: 20   Temp: 99.1 °F (37.3 °C)   TempSrc: Oral   SpO2: 96%   Weight: 86.8 kg (191 lb 6.4 oz)   Height: 5' 7" (1.702 m)      Physical Exam  Constitutional:       Appearance: Normal appearance.   HENT:      Head: Normocephalic.      Right Ear: Ear canal and external ear normal. Decreased hearing noted. A middle ear effusion is present.      Left Ear: Ear canal and external ear normal. Decreased hearing noted. A middle ear effusion is present.      Nose: Congestion present.      Right Turbinates: Swollen.      Left Turbinates: Swollen.      Mouth/Throat:      Mouth: Mucous membranes are moist.      Pharynx: Posterior oropharyngeal erythema present.   Eyes:      Extraocular Movements: Extraocular movements intact.   Cardiovascular:      Rate and Rhythm: Normal rate and regular rhythm.      Pulses: Normal pulses.      Heart sounds: Normal heart sounds.   Pulmonary:      Effort: Pulmonary effort is normal.      Breath sounds: Normal breath sounds.   Skin:     General: Skin is warm and dry.      Capillary Refill: Capillary refill takes less than 2 seconds.   Neurological:      General: No focal deficit present.      Mental Status: She is alert and oriented to person, place, and time.   Psychiatric:         Mood and Affect: Mood normal.         Behavior: Behavior normal.          Assessment and Plan (including Health Maintenance) "      Problem List  Smart Sets  Document Outside HM   :    Plan:     There are no Patient Instructions on file for this visit.       Health Maintenance Due   Topic Date Due    TETANUS VACCINE  Never done    RSV Vaccine (Age 60+ and Pregnant patients) (1 - 1-dose 75+ series) Never done    Influenza Vaccine (1) 09/01/2024    COVID-19 Vaccine (1 - 2024-25 season) Never done       Problem List Items Addressed This Visit       Acute laryngitis - Primary    Relevant Medications    cefTRIAXone injection 1 g (Completed)    dexAMETHasone injection 4 mg (Completed)    methylPREDNISolone acetate injection 40 mg (Completed)    azithromycin (Z-TOBI) 250 MG tablet    Chronic obstructive pulmonary disease    Relevant Medications    albuterol (PROVENTIL/VENTOLIN HFA) 90 mcg/actuation inhaler    Cough    Relevant Orders    POCT COVID-19 Rapid Screening (Completed)    POCT Influenza A/B Molecular (Completed)    Restless legs     Assessment & Plan    IMPRESSION:  - Assessed patient with low-grade fever, ear fluid without redness, sore throat, and productive cough with yellow/green sputum  - Determined upper respiratory infection likely, based on symptoms and previous October visit  - Decided on combination therapy: antibiotic (Z-pack), steroid injection, and inhaler refill to address multiple symptoms  - Considered patient's inability to take ibuprofen when recommending pain relief options    UPPER RESPIRATORY INFECTION:  - Assessed the patient's condition, noting symptoms have persisted for 2 weeks with a previous low-grade fever.  - Identified the condition as an upper respiratory infection, with the patient expectorating yellow and green sputum.  - Reviewed previous treatment from October.  - Prescribed Z-pack (azithromycin) antibiotic.  - Administered a steroid injection (likely prednisone or similar) in the office.  - Recommend OTC medications for symptom relief.  - Patient reports a persistent cough.  - Inquired about current  cough medication.  - Explained the expected effects of the steroid injection on cough relief.  - Advised using OTC Mucinex or Mucinex DM for cough and congestion relief.    RESPIRATORY SYMPTOMS:  - Patient reports difficulty breathing.  - Prescribed an inhaler refill to manage respiratory symptoms.  - Refilled inhaler prescription.    EAR CONDITION:  - Patient reports hearing issues.  - Examined the ears and found fluid in the left ear.  - Explained that the administered steroid injection should help with the ear condition.    SORE THROAT:  - Confirmed the patient has a sore throat.  - Examined the throat.  - Recommend OTC analgesics for sore throat relief.  - Advised the patient to use cough drops as needed for sore throat relief.    PAIN AND FEVER MANAGEMENT:  - Recommend acetaminophen for pain and fever management.    FOLLOW UP:  - Instructed the patient to contact the office by Friday if symptoms do not improve.  - Discussed use of OTC medications for symptom management.        Acute laryngitis  -     cefTRIAXone injection 1 g  -     dexAMETHasone injection 4 mg  -     methylPREDNISolone acetate injection 40 mg  -     azithromycin (Z-TOBI) 250 MG tablet; Take 2 tablets by mouth on day 1; Take 1 tablet by mouth on days 2-5  Dispense: 6 tablet; Refill: 0    Chronic obstructive pulmonary disease, unspecified COPD type  -     albuterol (PROVENTIL/VENTOLIN HFA) 90 mcg/actuation inhaler; Inhale 1-2 puffs into the lungs every 4 (four) hours as needed for Wheezing. Rescue  Dispense: 18 g; Refill: 3    Restless legs    Cough, unspecified type  -     POCT COVID-19 Rapid Screening  -     POCT Influenza A/B Molecular       Health Maintenance Topics with due status: Not Due       Topic Last Completion Date    DEXA Scan 07/27/2023    Lipid Panel 07/26/2024         Future Appointments   Date Time Provider Department Center   12/30/2024 10:00 AM ALEJANDRO CHAVEZ [facility]   8/11/2025  8:00 AM AW[facility]UR[facility]ANDR[facility] [place] [place]   9/15/2025  2:00 PM Ibis Devi [facility]        No follow-ups on file.    Health Maintenance Due   Topic Date Due    TETANUS VACCINE  Never done    RSV Vaccine (Age 60+ and Pregnant patients) (1 - 1-dose 75+ series) Never done    Influenza Vaccine (1) 09/01/2024    COVID-19 Vaccine (1 - 2024-25 season) Never done        Signature:  KEDAR Diaz    Date of encounter: 12/10/24  This note was generated with the assistance of ambient listening technology. Verbal consent was obtained by the patient and accompanying visitor(s) for the recording of patient appointment to facilitate this note. I attest to having reviewed and edited the generated note for accuracy, though some syntax or spelling errors may persist. Please contact the author of this note for any clarification.

## 2024-12-30 ENCOUNTER — INFUSION (OUTPATIENT)
Dept: INFUSION THERAPY | Facility: HOSPITAL | Age: 79
End: 2024-12-30
Attending: NURSE PRACTITIONER
Payer: MEDICARE

## 2024-12-30 VITALS
RESPIRATION RATE: 18 BRPM | HEART RATE: 61 BPM | BODY MASS INDEX: 30.7 KG/M2 | DIASTOLIC BLOOD PRESSURE: 78 MMHG | SYSTOLIC BLOOD PRESSURE: 135 MMHG | OXYGEN SATURATION: 97 % | TEMPERATURE: 98 F | WEIGHT: 196 LBS

## 2024-12-30 DIAGNOSIS — M81.0 AGE-RELATED OSTEOPOROSIS WITHOUT CURRENT PATHOLOGICAL FRACTURE: Primary | ICD-10-CM

## 2024-12-30 DIAGNOSIS — G25.81 RESTLESS LEGS: ICD-10-CM

## 2024-12-30 LAB — CALCIUM SERPL-MCNC: 9.5 MG/DL (ref 8.4–10.2)

## 2024-12-30 PROCEDURE — 36415 COLL VENOUS BLD VENIPUNCTURE: CPT | Performed by: NURSE PRACTITIONER

## 2024-12-30 PROCEDURE — 63600175 PHARM REV CODE 636 W HCPCS: Mod: JZ,TB | Performed by: NURSE PRACTITIONER

## 2024-12-30 PROCEDURE — 96372 THER/PROPH/DIAG INJ SC/IM: CPT

## 2024-12-30 RX ORDER — PRAMIPEXOLE DIHYDROCHLORIDE 0.25 MG/1
0.25 TABLET ORAL 3 TIMES DAILY
Qty: 90 TABLET | Refills: 2 | Status: SHIPPED | OUTPATIENT
Start: 2024-12-30 | End: 2025-12-30

## 2024-12-30 RX ADMIN — DENOSUMAB 60 MG: 60 INJECTION SUBCUTANEOUS at 09:12

## 2024-12-30 NOTE — PROGRESS NOTES
0845  Patient in room 2.  Calcium level today was 9.5.    0912 Administered Prolia 60mg SQ to left upper arm per protocol.  Patient tolerated well.     0932 No adverse reactions noted at this time. Patient discharged to home ambulatory with appt to return in 6 months for next prolia injection.

## 2025-01-07 DIAGNOSIS — J44.9 CHRONIC OBSTRUCTIVE PULMONARY DISEASE, UNSPECIFIED COPD TYPE: ICD-10-CM

## 2025-01-07 DIAGNOSIS — E78.2 MIXED HYPERLIPIDEMIA: ICD-10-CM

## 2025-01-07 RX ORDER — ATORVASTATIN CALCIUM 80 MG/1
80 TABLET, FILM COATED ORAL DAILY
Qty: 90 TABLET | Refills: 1 | Status: SHIPPED | OUTPATIENT
Start: 2025-01-07

## 2025-01-07 RX ORDER — IPRATROPIUM BROMIDE AND ALBUTEROL SULFATE 2.5; .5 MG/3ML; MG/3ML
3 SOLUTION RESPIRATORY (INHALATION) EVERY 6 HOURS PRN
Qty: 100 ML | Refills: 4 | Status: SHIPPED | OUTPATIENT
Start: 2025-01-07

## 2025-02-07 DIAGNOSIS — M16.11 PRIMARY OSTEOARTHRITIS OF RIGHT HIP: ICD-10-CM

## 2025-02-07 DIAGNOSIS — H65.23 BILATERAL CHRONIC SEROUS OTITIS MEDIA: ICD-10-CM

## 2025-02-07 RX ORDER — IPRATROPIUM BROMIDE 42 UG/1
2 SPRAY, METERED NASAL 4 TIMES DAILY
Qty: 15 ML | Refills: 4 | Status: SHIPPED | OUTPATIENT
Start: 2025-02-07 | End: 2025-02-12 | Stop reason: SDUPTHER

## 2025-02-12 ENCOUNTER — OFFICE VISIT (OUTPATIENT)
Dept: FAMILY MEDICINE | Facility: CLINIC | Age: 80
End: 2025-02-12
Payer: MEDICARE

## 2025-02-12 DIAGNOSIS — M16.11 PRIMARY OSTEOARTHRITIS OF RIGHT HIP: ICD-10-CM

## 2025-02-12 DIAGNOSIS — M54.41 CHRONIC MIDLINE LOW BACK PAIN WITH RIGHT-SIDED SCIATICA: ICD-10-CM

## 2025-02-12 DIAGNOSIS — M16.0 PRIMARY OSTEOARTHRITIS OF BOTH HIPS: ICD-10-CM

## 2025-02-12 DIAGNOSIS — G89.29 CHRONIC MIDLINE LOW BACK PAIN WITH RIGHT-SIDED SCIATICA: ICD-10-CM

## 2025-02-12 DIAGNOSIS — I10 ESSENTIAL HYPERTENSION: ICD-10-CM

## 2025-02-12 DIAGNOSIS — H65.23 BILATERAL CHRONIC SEROUS OTITIS MEDIA: ICD-10-CM

## 2025-02-12 DIAGNOSIS — R60.9 EDEMA, UNSPECIFIED TYPE: ICD-10-CM

## 2025-02-12 DIAGNOSIS — K21.9 GERD WITHOUT ESOPHAGITIS: ICD-10-CM

## 2025-02-12 DIAGNOSIS — G25.81 RESTLESS LEGS: ICD-10-CM

## 2025-02-12 DIAGNOSIS — I82.592 CHRONIC DEEP VEIN THROMBOSIS (DVT) OF OTHER VEIN OF LEFT LOWER EXTREMITY: ICD-10-CM

## 2025-02-12 DIAGNOSIS — J44.9 CHRONIC OBSTRUCTIVE PULMONARY DISEASE, UNSPECIFIED COPD TYPE: ICD-10-CM

## 2025-02-12 DIAGNOSIS — I10 HYPERTENSION, UNSPECIFIED TYPE: Primary | ICD-10-CM

## 2025-02-12 DIAGNOSIS — F33.0 MILD EPISODE OF RECURRENT MAJOR DEPRESSIVE DISORDER: ICD-10-CM

## 2025-02-12 DIAGNOSIS — E78.2 MIXED HYPERLIPIDEMIA: ICD-10-CM

## 2025-02-12 DIAGNOSIS — I69.351 HEMIPLEGIA AND HEMIPARESIS FOLLOWING CEREBRAL INFARCTION AFFECTING RIGHT DOMINANT SIDE: ICD-10-CM

## 2025-02-12 LAB
ALBUMIN SERPL BCP-MCNC: 3.8 G/DL (ref 3.4–4.8)
ALBUMIN/GLOB SERPL: 1.3 {RATIO}
ALP SERPL-CCNC: 82 U/L (ref 40–150)
ALT SERPL W P-5'-P-CCNC: 52 U/L
ANION GAP SERPL CALCULATED.3IONS-SCNC: 14 MMOL/L (ref 7–16)
AST SERPL W P-5'-P-CCNC: 65 U/L (ref 5–34)
BASOPHILS # BLD AUTO: 0.09 K/UL (ref 0–0.2)
BASOPHILS NFR BLD AUTO: 1.5 % (ref 0–1)
BILIRUB SERPL-MCNC: 0.3 MG/DL
BUN SERPL-MCNC: 17 MG/DL (ref 10–20)
BUN/CREAT SERPL: 18 (ref 6–20)
CALCIUM SERPL-MCNC: 9.6 MG/DL (ref 8.4–10.2)
CHLORIDE SERPL-SCNC: 100 MMOL/L (ref 98–107)
CHOLEST SERPL-MCNC: 165 MG/DL
CHOLEST/HDLC SERPL: 2.3 {RATIO}
CO2 SERPL-SCNC: 30 MMOL/L (ref 23–31)
CREAT SERPL-MCNC: 0.94 MG/DL (ref 0.55–1.02)
DIFFERENTIAL METHOD BLD: ABNORMAL
EGFR (NO RACE VARIABLE) (RUSH/TITUS): 61 ML/MIN/1.73M2
EOSINOPHIL # BLD AUTO: 0.21 K/UL (ref 0–0.5)
EOSINOPHIL NFR BLD AUTO: 3.5 % (ref 1–4)
ERYTHROCYTE [DISTWIDTH] IN BLOOD BY AUTOMATED COUNT: 15.3 % (ref 11.5–14.5)
GLOBULIN SER-MCNC: 2.9 G/DL (ref 2–4)
GLUCOSE SERPL-MCNC: 91 MG/DL (ref 82–115)
HCT VFR BLD AUTO: 41 % (ref 38–47)
HDLC SERPL-MCNC: 71 MG/DL (ref 35–60)
HGB BLD-MCNC: 12.7 G/DL (ref 12–16)
IMM GRANULOCYTES # BLD AUTO: 0.01 K/UL (ref 0–0.04)
IMM GRANULOCYTES NFR BLD: 0.2 % (ref 0–0.4)
LDLC SERPL CALC-MCNC: 78 MG/DL
LDLC/HDLC SERPL: 1.1 {RATIO}
LYMPHOCYTES # BLD AUTO: 1.89 K/UL (ref 1–4.8)
LYMPHOCYTES NFR BLD AUTO: 31.4 % (ref 27–41)
MCH RBC QN AUTO: 27.9 PG (ref 27–31)
MCHC RBC AUTO-ENTMCNC: 31 G/DL (ref 32–36)
MCV RBC AUTO: 89.9 FL (ref 80–96)
MONOCYTES # BLD AUTO: 0.54 K/UL (ref 0–0.8)
MONOCYTES NFR BLD AUTO: 9 % (ref 2–6)
MPC BLD CALC-MCNC: 11.6 FL (ref 9.4–12.4)
NEUTROPHILS # BLD AUTO: 3.27 K/UL (ref 1.8–7.7)
NEUTROPHILS NFR BLD AUTO: 54.4 % (ref 53–65)
NONHDLC SERPL-MCNC: 94 MG/DL
NRBC # BLD AUTO: 0 X10E3/UL
NRBC, AUTO (.00): 0 %
PLATELET # BLD AUTO: 257 K/UL (ref 150–400)
POTASSIUM SERPL-SCNC: 4.9 MMOL/L (ref 3.5–5.1)
PROT SERPL-MCNC: 6.7 G/DL (ref 5.8–7.6)
RBC # BLD AUTO: 4.56 M/UL (ref 4.2–5.4)
SODIUM SERPL-SCNC: 139 MMOL/L (ref 136–145)
TRIGL SERPL-MCNC: 82 MG/DL (ref 37–140)
VLDLC SERPL-MCNC: 16 MG/DL
WBC # BLD AUTO: 6.01 K/UL (ref 4.5–11)

## 2025-02-12 PROCEDURE — 1159F MED LIST DOCD IN RCRD: CPT | Mod: ,,, | Performed by: NURSE PRACTITIONER

## 2025-02-12 PROCEDURE — 99214 OFFICE O/P EST MOD 30 MIN: CPT | Mod: 25,,, | Performed by: NURSE PRACTITIONER

## 2025-02-12 PROCEDURE — 80053 COMPREHEN METABOLIC PANEL: CPT | Mod: ,,, | Performed by: CLINICAL MEDICAL LABORATORY

## 2025-02-12 PROCEDURE — 1160F RVW MEDS BY RX/DR IN RCRD: CPT | Mod: ,,, | Performed by: NURSE PRACTITIONER

## 2025-02-12 PROCEDURE — 80061 LIPID PANEL: CPT | Mod: ,,, | Performed by: CLINICAL MEDICAL LABORATORY

## 2025-02-12 PROCEDURE — 96372 THER/PROPH/DIAG INJ SC/IM: CPT | Mod: ,,, | Performed by: NURSE PRACTITIONER

## 2025-02-12 PROCEDURE — 85025 COMPLETE CBC W/AUTO DIFF WBC: CPT | Mod: ,,, | Performed by: CLINICAL MEDICAL LABORATORY

## 2025-02-12 PROCEDURE — 1101F PT FALLS ASSESS-DOCD LE1/YR: CPT | Mod: ,,, | Performed by: NURSE PRACTITIONER

## 2025-02-12 PROCEDURE — 3288F FALL RISK ASSESSMENT DOCD: CPT | Mod: ,,, | Performed by: NURSE PRACTITIONER

## 2025-02-12 RX ORDER — FUROSEMIDE 20 MG/1
20 TABLET ORAL DAILY
Qty: 90 TABLET | Refills: 1 | Status: SHIPPED | OUTPATIENT
Start: 2025-02-12

## 2025-02-12 RX ORDER — ALBUTEROL SULFATE 90 UG/1
1-2 INHALANT RESPIRATORY (INHALATION) EVERY 4 HOURS PRN
Qty: 18 G | Refills: 3 | Status: SHIPPED | OUTPATIENT
Start: 2025-02-12

## 2025-02-12 RX ORDER — DEXAMETHASONE SODIUM PHOSPHATE 4 MG/ML
4 INJECTION, SOLUTION INTRA-ARTICULAR; INTRALESIONAL; INTRAMUSCULAR; INTRAVENOUS; SOFT TISSUE
Status: COMPLETED | OUTPATIENT
Start: 2025-02-12 | End: 2025-02-12

## 2025-02-12 RX ORDER — PROPRANOLOL HYDROCHLORIDE 40 MG/1
40 TABLET ORAL 2 TIMES DAILY
Qty: 180 TABLET | Refills: 1 | Status: SHIPPED | OUTPATIENT
Start: 2025-02-12

## 2025-02-12 RX ORDER — IPRATROPIUM BROMIDE 42 UG/1
2 SPRAY, METERED NASAL 4 TIMES DAILY
Qty: 15 ML | Refills: 4 | Status: SHIPPED | OUTPATIENT
Start: 2025-02-12

## 2025-02-12 RX ORDER — DICLOFENAC SODIUM 10 MG/G
2 GEL TOPICAL DAILY
Qty: 450 G | Refills: 3 | Status: SHIPPED | OUTPATIENT
Start: 2025-02-12

## 2025-02-12 RX ORDER — GABAPENTIN 300 MG/1
CAPSULE ORAL
Qty: 380 CAPSULE | Refills: 1 | Status: SHIPPED | OUTPATIENT
Start: 2025-02-12

## 2025-02-12 RX ORDER — OMEPRAZOLE 20 MG/1
20 CAPSULE, DELAYED RELEASE ORAL DAILY
Qty: 90 CAPSULE | Refills: 1 | Status: SHIPPED | OUTPATIENT
Start: 2025-02-12

## 2025-02-12 RX ORDER — METHYLPREDNISOLONE ACETATE 40 MG/ML
40 INJECTION, SUSPENSION INTRA-ARTICULAR; INTRALESIONAL; INTRAMUSCULAR; SOFT TISSUE
Status: COMPLETED | OUTPATIENT
Start: 2025-02-12 | End: 2025-02-12

## 2025-02-12 RX ORDER — IPRATROPIUM BROMIDE AND ALBUTEROL SULFATE 2.5; .5 MG/3ML; MG/3ML
3 SOLUTION RESPIRATORY (INHALATION) EVERY 6 HOURS PRN
Qty: 100 ML | Refills: 4 | Status: SHIPPED | OUTPATIENT
Start: 2025-02-12

## 2025-02-12 RX ORDER — DESVENLAFAXINE 100 MG/1
100 TABLET, EXTENDED RELEASE ORAL DAILY
Qty: 90 TABLET | Refills: 1 | Status: SHIPPED | OUTPATIENT
Start: 2025-02-12

## 2025-02-12 RX ORDER — PRAMIPEXOLE DIHYDROCHLORIDE 0.25 MG/1
0.25 TABLET ORAL 3 TIMES DAILY
Qty: 90 TABLET | Refills: 2 | Status: SHIPPED | OUTPATIENT
Start: 2025-02-12 | End: 2026-02-12

## 2025-02-12 RX ORDER — ATORVASTATIN CALCIUM 80 MG/1
80 TABLET, FILM COATED ORAL DAILY
Qty: 90 TABLET | Refills: 1 | Status: SHIPPED | OUTPATIENT
Start: 2025-02-12

## 2025-02-12 RX ORDER — LOSARTAN POTASSIUM 100 MG/1
100 TABLET ORAL DAILY
Qty: 90 TABLET | Refills: 1 | Status: SHIPPED | OUTPATIENT
Start: 2025-02-12

## 2025-02-12 RX ORDER — PRIMIDONE 50 MG/1
TABLET ORAL
Qty: 450 TABLET | Refills: 1 | Status: SHIPPED | OUTPATIENT
Start: 2025-02-12

## 2025-02-12 RX ORDER — AMLODIPINE BESYLATE 5 MG/1
5 TABLET ORAL 2 TIMES DAILY
Qty: 180 TABLET | Refills: 1 | Status: SHIPPED | OUTPATIENT
Start: 2025-02-12

## 2025-02-12 RX ORDER — TIZANIDINE 4 MG/1
4 TABLET ORAL EVERY 12 HOURS PRN
Qty: 30 TABLET | Refills: 1 | Status: SHIPPED | OUTPATIENT
Start: 2025-02-12

## 2025-02-12 RX ORDER — CELECOXIB 200 MG/1
200 CAPSULE ORAL DAILY
Qty: 90 CAPSULE | Refills: 1 | Status: SHIPPED | OUTPATIENT
Start: 2025-02-12

## 2025-02-12 RX ADMIN — METHYLPREDNISOLONE ACETATE 40 MG: 40 INJECTION, SUSPENSION INTRA-ARTICULAR; INTRALESIONAL; INTRAMUSCULAR; SOFT TISSUE at 10:02

## 2025-02-12 RX ADMIN — DEXAMETHASONE SODIUM PHOSPHATE 4 MG: 4 INJECTION, SOLUTION INTRA-ARTICULAR; INTRALESIONAL; INTRAMUSCULAR; INTRAVENOUS; SOFT TISSUE at 10:02

## 2025-02-12 NOTE — PROGRESS NOTES
"   KEDAR Diaz   [facility], [place]  [address]  [place] [address]     PATIENT NAME: Felicia Keita  : 1945  DATE: 25  MRN: [id number]      Billing Provider: KEDAR Daiz  Level of Service:   Patient PCP Information       Provider PCP Type    KEDAR Diaz General            Reason for Visit / Chief Complaint: Health Maintenance (Mrs.Juanita Keita is a 80 y.o. female who presents to the clinic today  for medicine refills and a check up.Wants a steroid shot for arthritis and back pain.)   Health Maintenance Due   Topic Date Due    TETANUS VACCINE  Never done    RSV Vaccine (Age 60+ and Pregnant patients) (1 - 1-dose 75+ series) Never done    COVID-19 Vaccine ( season) Never done          Update PCP  Update Chief Complaint         History of Present Illness / Problem Focused Workflow     History of Present Illness    CHIEF COMPLAINT:  Patient presents today for check up. She is also having difficulty with her "trigger finger" and back pain. She would like steroid injection today for pain. She states she had a trigger finger to her right hand in the past and was able to "massage" it out so she has been trying to do this for her left hand as well.     NEUROLOGICAL:  She reports experiencing memory issues. She has a history of mild stroke requiring 4-day hospitalization in the Emergency Department.    RESTLESS LEG SYNDROME:  She continues Mirapex for restless legs syndrome, which remains quite bothersome. She reports difficulty keeping her legs on the bed.    CURRENT MEDICATIONS:  She continues amlodipine, atorvastatin, Celebrex, Pristiq, and gabapentin. She uses Voltaren gel for feet, knees, and hands but reports insufficient supply, with one tube lasting two weeks compared to previous four tubes weekly. She previously discontinued Lyrica due to need to continue gabapentin.          Hemoglobin A1C   Date Value Ref Range Status   2023 5.5 4.5 - 6.6 % Final     Comment:     "   Normal:               <5.7%  Pre-Diabetic:       5.7% to 6.4%  Diabetic:             >6.4%  Diabetic Goal:     <7%   07/20/2022 5.6 4.5 - 6.6 % Final     Comment:       Normal:               <5.7%  Pre-Diabetic:       5.7% to 6.4%  Diabetic:             >6.4%  Diabetic Goal:     <7%        CMP  Sodium   Date Value Ref Range Status   07/26/2024 133 (L) 136 - 145 mmol/L Final     Potassium   Date Value Ref Range Status   07/26/2024 3.9 3.5 - 5.1 mmol/L Final     Chloride   Date Value Ref Range Status   07/26/2024 99 98 - 107 mmol/L Final     CO2   Date Value Ref Range Status   07/26/2024 28 21 - 32 mmol/L Final     Glucose   Date Value Ref Range Status   07/26/2024 92 74 - 106 mg/dL Final     BUN   Date Value Ref Range Status   07/26/2024 10 7 - 18 mg/dL Final     Creatinine   Date Value Ref Range Status   07/26/2024 1.39 (H) 0.55 - 1.02 mg/dL Final     Calcium   Date Value Ref Range Status   12/30/2024 9.5 8.4 - 10.2 mg/dL Final     Total Protein   Date Value Ref Range Status   07/26/2024 7.0 6.4 - 8.2 g/dL Final     Albumin   Date Value Ref Range Status   07/26/2024 3.9 3.5 - 5.0 g/dL Final     Bilirubin, Total   Date Value Ref Range Status   07/26/2024 0.2 >0.0 - 1.2 mg/dL Final     Alk Phos   Date Value Ref Range Status   07/26/2024 103 55 - 142 U/L Final     AST   Date Value Ref Range Status   07/26/2024 29 15 - 37 U/L Final     ALT   Date Value Ref Range Status   07/26/2024 36 13 - 56 U/L Final     Anion Gap   Date Value Ref Range Status   07/26/2024 10 7 - 16 mmol/L Final     eGFR   Date Value Ref Range Status   07/26/2024 38 (L) >=60 mL/min/1.73m2 Final        Lab Results   Component Value Date    WBC 5.47 07/26/2024    RBC 4.68 07/26/2024    HGB 12.8 07/26/2024    HCT 41.7 07/26/2024    MCV 89.1 07/26/2024    MCH 27.4 07/26/2024    MCHC 30.7 (L) 07/26/2024    RDW 14.8 (H) 07/26/2024     07/26/2024    MPV 11.4 07/26/2024    LYMPH 29.3 07/26/2024    LYMPH 1.60 07/26/2024    MONO 8.6 (H) 07/26/2024     EOS 0.25 07/26/2024    BASO 0.07 07/26/2024    EOSINOPHIL 4.6 (H) 07/26/2024    BASOPHIL 1.3 (H) 07/26/2024        Lab Results   Component Value Date    CHOL 157 07/26/2024    CHOL 168 04/25/2024    CHOL 171 01/25/2024     Lab Results   Component Value Date    HDL 96 (H) 07/26/2024    HDL 88 (H) 04/25/2024    HDL 88 (H) 01/25/2024     Lab Results   Component Value Date    LDLCALC 50 07/26/2024    LDLCALC 67 04/25/2024    LDLCALC 72 01/25/2024     Lab Results   Component Value Date    TRIG 54 07/26/2024    TRIG 63 04/25/2024    TRIG 57 01/25/2024     Lab Results   Component Value Date    CHOLHDL 1.6 07/26/2024    CHOLHDL 1.9 04/25/2024    CHOLHDL 1.9 01/25/2024        Wt Readings from Last 3 Encounters:   12/30/24 0905 88.9 kg (196 lb)   12/10/24 1323 86.8 kg (191 lb 6.4 oz)   10/17/24 1003 88.7 kg (195 lb 9.6 oz)        BP Readings from Last 3 Encounters:   12/30/24 135/78   12/10/24 139/65   10/17/24 128/75        Review of Systems     Review of Systems       Medical / Social / Family History     Past Medical History:   Diagnosis Date    Abnormal gait 05/22/2013    Actinic keratosis 08/12/2020    L forearm     Acute cough 03/30/2023    Allergic rhinitis 04/07/2020    Bilateral chronic serous otitis media 05/22/2024    Blepharophimosis 04/22/2014    senile    Cervical somatic dysfunction 08/15/2017    Cervicalgia 03/28/2019    Chronic peripheral venous hypertension 06/16/2015    Chronic serous otitis media, bilateral 09/05/2019    Chronic venous hypertension (idiopathic) without complications of unspecified lower extremity 08/15/2017    Conjunctival hemorrhage, right eye 11/02/2020    Deep venous thrombosis of lower extremity 09/17/2012    Degeneration of cervical intervertebral disc 10/21/2011    Degeneration of lumbar intervertebral disc 10/21/2011    Degenerative joint disease involving multiple joints 07/25/2011    Depressive disorder 07/25/2011    Essential hypertension 08/12/2020    Essential tremor 06/24/2014     GERD without esophagitis 08/15/2017    Headache 04/07/2020    Hyperlipidemia 05/08/2012    Insomnia 04/06/2016    Osteoarthritis 01/04/2017    Osteoporosis 11/07/2017    Otalgia, right ear 10/17/2016    Other cervical disc degeneration, unspecified cervical region 08/15/2017    Overflow incontinence 01/06/2020    Pain in right knee 01/06/2020    osteoarthritis    Pain in right leg 09/10/2020    Peripheral arterial occlusive disease 02/24/2015    Peripheral vascular disease, unspecified 08/06/2019    Peripheral venous insufficiency 09/29/2015    Personal history of PE (pulmonary embolism) 08/06/2019    Presence of vena cava filter 03/2015    Pulmonary embolus 03/10/2015    Pulmonary infarction 03/16/2012    Pure hypercholesterolemia 07/25/2011    Restless legs 05/22/2018    Right temporomandibular joint disorder, unspecified 08/18/2020    Rosacea 07/01/2015    Sciatica, right side 07/06/2016    Seborrheic keratosis 01/07/2021    Segmental and somatic dysfunction of thoracic region 03/28/2019    Senile osteoporosis 04/24/2017    Spasm of back muscles 07/25/2011    Stroke 09/25/2023    Trochanteric bursitis of right hip 01/06/2020    Unspecified urinary incontinence 02/07/2020    Vitamin D deficiency 10/27/2014       Past Surgical History:   Procedure Laterality Date    APPENDECTOMY      bone density  06/27/2019    Ajith/Abiel    BREAST SURGERY Left 2013    approximate    CARPAL TUNNEL RELEASE      BLI    LUMBAR LAMINECTOMY      prolia  03/12/2019    1mg    remove cataract Right     insert lens    TONSILLECTOMY      TOTAL ABDOMINAL HYSTERECTOMY      VAGINAL DELIVERY      vascular surgery procedure       Right SSV Laser Ablation performed by Dr. Carlo hernandez screen  05/24/2018       Social History  Ms.  reports that she has never smoked. She has never been exposed to tobacco smoke. She has never used smokeless tobacco. She reports that she does not drink alcohol and does not use drugs.    Family History  Ms.'s  family history includes COPD in her daughter and sister; Cancer in her brother; Emphysema in her father; Hearing loss in her father; Hypertension in her father and sister; Melanoma in her brother; Rheum arthritis in her daughter and sister; Skin cancer in her father; Stomach cancer in her sister.    Medications and Allergies     Medications  Outpatient Medications Marked as Taking for the 2/12/25 encounter (Office Visit) with Demi Harris FNP   Medication Sig Dispense Refill    ascorbic acid, vitamin C, (VITAMIN C) 500 MG tablet Take 500 mg by mouth once daily.      aspirin (ECOTRIN) 81 MG EC tablet Take 1 tablet (81 mg total) by mouth once daily. 30 tablet 0    calcium carbonate-vitamin D3 1,000 mg-20 mcg (800 unit) Tab Take 1 each by mouth Daily. 90 tablet 1    cyanocobalamin/folic acid (VITAMIN B12-FOLIC ACID) 500-400 mcg Tab take 1 tablet by oral route daily Oral 1      denosumab (PROLIA) 60 mg/mL Syrg as directed Subcutaneous      diaper,brief,adult,disposable Misc 1 each by Misc.(Non-Drug; Combo Route) route continuous prn (prn). 120 each 5    HYDROcodone-acetaminophen (NORCO)  mg per tablet 05/27/21 TAKE 1 TABLET BY MOUTH EVERY 8 HOURS AS NEEDED      senna-docusate 8.6-50 mg (PERICOLACE) 8.6-50 mg per tablet Take 1 tablet by mouth once daily.      zinc gluconate 50 mg tablet Take 50 mg by mouth once daily.      [DISCONTINUED] albuterol (PROVENTIL/VENTOLIN HFA) 90 mcg/actuation inhaler Inhale 1-2 puffs into the lungs every 4 (four) hours as needed for Wheezing. Rescue 18 g 3    [DISCONTINUED] albuterol-ipratropium (DUO-NEB) 2.5 mg-0.5 mg/3 mL nebulizer solution Take 3 mLs by nebulization every 6 (six) hours as needed for Wheezing. Rescue 100 mL 4    [DISCONTINUED] amLODIPine (NORVASC) 5 MG tablet Take 1 tablet (5 mg total) by mouth 2 (two) times daily. 180 tablet 1    [DISCONTINUED] atorvastatin (LIPITOR) 80 MG tablet Take 1 tablet (80 mg total) by mouth once daily. 90 tablet 1    [DISCONTINUED]  celecoxib (CELEBREX) 200 MG capsule Take 1 capsule (200 mg total) by mouth once daily. 90 capsule 1    [DISCONTINUED] desvenlafaxine succinate (PRISTIQ) 100 MG Tb24 Take 1 tablet (100 mg total) by mouth once daily. 90 tablet 1    [DISCONTINUED] diclofenac sodium (VOLTAREN) 1 % Gel Apply 2 g topically once daily. 2 g 3    [DISCONTINUED] furosemide (LASIX) 20 MG tablet Take 1 tablet (20 mg total) by mouth once daily. 90 tablet 1    [DISCONTINUED] gabapentin (NEURONTIN) 300 MG capsule TAKE 1 CAPSULE BY MOUTH IN THE MORNING, TAKE 1 CAPSULE at LUNCH, AND TAKE 2 CAPSULES AT BEDTIME 380 capsule 1    [DISCONTINUED] ipratropium (ATROVENT) 42 mcg (0.06 %) nasal spray 2 sprays by Each Nostril route 4 (four) times daily. 15 mL 4    [DISCONTINUED] losartan (COZAAR) 100 MG tablet Take 1 tablet (100 mg total) by mouth once daily. 90 tablet 1    [DISCONTINUED] omeprazole (PRILOSEC) 20 MG capsule Take 1 capsule (20 mg total) by mouth once daily. 90 capsule 1    [DISCONTINUED] pramipexole (MIRAPEX) 0.25 MG tablet Take 1 tablet (0.25 mg total) by mouth 3 (three) times daily. 90 tablet 2    [DISCONTINUED] primidone (MYSOLINE) 50 MG Tab TAKE 2 TABLETS BY MOUTH EVERY MORNING AND TAKE 3 TABLETS BY MOUTH EVERY EVENING 450 tablet 1    [DISCONTINUED] propranoloL (INDERAL) 40 MG tablet Take 1 tablet (40 mg total) by mouth 2 (two) times daily. 180 tablet 1    [DISCONTINUED] rivaroxaban (XARELTO) 20 mg Tab Take 1 tablet (20 mg total) by mouth once daily. 90 tablet 1    [DISCONTINUED] tiZANidine (ZANAFLEX) 4 MG tablet Take 4 mg by mouth every 12 (twelve) hours as needed.       Current Facility-Administered Medications for the 2/12/25 encounter (Office Visit) with Demi Harris FNP   Medication Dose Route Frequency Provider Last Rate Last Admin    [COMPLETED] dexAMETHasone injection 4 mg  4 mg Intramuscular 1 time in Clinic/HOD Demi Harris FNP   4 mg at 02/12/25 1003    [COMPLETED] methylPREDNISolone acetate injection 40 mg  40 mg Intramuscular  1 time in Clinic/HOD Demi Harris, FNP   40 mg at 02/12/25 1004       Allergies  Review of patient's allergies indicates:  No Known Allergies    Physical Examination   There were no vitals filed for this visit.   Physical Exam  Constitutional:       Appearance: Normal appearance.   HENT:      Head: Normocephalic.   Eyes:      Extraocular Movements: Extraocular movements intact.   Cardiovascular:      Rate and Rhythm: Normal rate and regular rhythm.      Pulses: Normal pulses.      Heart sounds: Normal heart sounds.   Pulmonary:      Effort: Pulmonary effort is normal.      Breath sounds: Normal breath sounds.   Musculoskeletal:      Comments: Trigger finger to left fourth finger   Skin:     General: Skin is warm and dry.      Capillary Refill: Capillary refill takes less than 2 seconds.   Neurological:      General: No focal deficit present.      Mental Status: She is alert and oriented to person, place, and time.   Psychiatric:         Mood and Affect: Mood normal.         Behavior: Behavior normal.          Assessment and Plan (including Health Maintenance)      Problem List  Smart Sets  Document Outside HM   :    Plan:     There are no Patient Instructions on file for this visit.       Health Maintenance Due   Topic Date Due    TETANUS VACCINE  Never done    RSV Vaccine (Age 60+ and Pregnant patients) (1 - 1-dose 75+ series) Never done    COVID-19 Vaccine (1 - 2024-25 season) Never done       Problem List Items Addressed This Visit       Back pain    Relevant Medications    celecoxib (CELEBREX) 200 MG capsule    tiZANidine (ZANAFLEX) 4 MG tablet    gabapentin (NEURONTIN) 300 MG capsule    dexAMETHasone injection 4 mg (Completed)    methylPREDNISolone acetate injection 40 mg (Completed)    Bilateral chronic serous otitis media    Relevant Medications    ipratropium (ATROVENT) 42 mcg (0.06 %) nasal spray    Chronic deep vein thrombosis (DVT) of other vein of left lower extremity    Relevant Medications     rivaroxaban (XARELTO) 20 mg Tab    Chronic obstructive pulmonary disease    Relevant Medications    albuterol (PROVENTIL/VENTOLIN HFA) 90 mcg/actuation inhaler    albuterol-ipratropium (DUO-NEB) 2.5 mg-0.5 mg/3 mL nebulizer solution    Depression    Relevant Medications    desvenlafaxine succinate (PRISTIQ) 100 MG Tb24    Edema    Relevant Medications    furosemide (LASIX) 20 MG tablet    Essential hypertension    Relevant Medications    losartan (COZAAR) 100 MG tablet    GERD without esophagitis    Relevant Medications    omeprazole (PRILOSEC) 20 MG capsule    Hemiplegia and hemiparesis following cerebral infarction affecting right dominant side    Hyperlipidemia    Relevant Medications    atorvastatin (LIPITOR) 80 MG tablet    Other Relevant Orders    Lipid Panel    Hypertension - Primary    Relevant Medications    amLODIPine (NORVASC) 5 MG tablet    propranoloL (INDERAL) 40 MG tablet    Other Relevant Orders    CBC Auto Differential    Comprehensive Metabolic Panel    Osteoarthritis    Relevant Medications    ipratropium (ATROVENT) 42 mcg (0.06 %) nasal spray    diclofenac sodium (VOLTAREN) 1 % Gel    Restless legs    Relevant Medications    primidone (MYSOLINE) 50 MG Tab    pramipexole (MIRAPEX) 0.25 MG tablet     Assessment & Plan    IMPRESSION:  - Will administer steroid injection for trigger finger, back pain  - Reviewed medication regimen and made adjustments  - Assessed need for lab work due to time elapsed since last tests    CHRONIC OBSTRUCTIVE PULMONARY DISEASE, UNSPECIFIED COPD TYPE:  - Patient received a steroid injection in December for upper respiratory issues, potentially related to COPD management.    TRIGGER FINGER:  - Administered a steroid injection for trigger finger treatment.  - Instructed patient to monitor and report any changes in finger condition.    PAIN MANAGEMENT:  - Reviewed patient's use of Voltaren gel on feet and knees.  - Increased prescription to larger tube size.  - Instructed  on proper application and frequency of use for the increased dosage.    RESTLESS LEG SYNDROME:  - Increased Mirapex dosage to 3 times daily as needed for better symptom control.  - Advised patient to monitor effectiveness and report any side effects.       Hypertension, unspecified type  -     amLODIPine (NORVASC) 5 MG tablet; Take 1 tablet (5 mg total) by mouth 2 (two) times daily.  Dispense: 180 tablet; Refill: 1  -     propranoloL (INDERAL) 40 MG tablet; Take 1 tablet (40 mg total) by mouth 2 (two) times daily.  Dispense: 180 tablet; Refill: 1  -     CBC Auto Differential; Future; Expected date: 02/12/2025  -     Comprehensive Metabolic Panel; Future; Expected date: 02/12/2025    Chronic obstructive pulmonary disease, unspecified COPD type  -     albuterol (PROVENTIL/VENTOLIN HFA) 90 mcg/actuation inhaler; Inhale 1-2 puffs into the lungs every 4 (four) hours as needed for Wheezing. Rescue  Dispense: 18 g; Refill: 3  -     albuterol-ipratropium (DUO-NEB) 2.5 mg-0.5 mg/3 mL nebulizer solution; Take 3 mLs by nebulization every 6 (six) hours as needed for Wheezing. Rescue  Dispense: 100 mL; Refill: 4    Mixed hyperlipidemia  -     atorvastatin (LIPITOR) 80 MG tablet; Take 1 tablet (80 mg total) by mouth once daily.  Dispense: 90 tablet; Refill: 1  -     Lipid Panel; Future; Expected date: 02/12/2025    Chronic midline low back pain with right-sided sciatica  -     celecoxib (CELEBREX) 200 MG capsule; Take 1 capsule (200 mg total) by mouth once daily.  Dispense: 90 capsule; Refill: 1  -     tiZANidine (ZANAFLEX) 4 MG tablet; Take 1 tablet (4 mg total) by mouth every 12 (twelve) hours as needed (for muscle spasms).  Dispense: 30 tablet; Refill: 1  -     gabapentin (NEURONTIN) 300 MG capsule; TAKE 1 CAPSULE BY MOUTH IN THE MORNING, TAKE 1 CAPSULE at LUNCH, AND TAKE 2 CAPSULES AT BEDTIME  Dispense: 380 capsule; Refill: 1  -     dexAMETHasone injection 4 mg  -     methylPREDNISolone acetate injection 40 mg    Mild episode  of recurrent major depressive disorder  -     desvenlafaxine succinate (PRISTIQ) 100 MG Tb24; Take 1 tablet (100 mg total) by mouth once daily.  Dispense: 90 tablet; Refill: 1    Chronic deep vein thrombosis (DVT) of other vein of left lower extremity  -     rivaroxaban (XARELTO) 20 mg Tab; Take 1 tablet (20 mg total) by mouth once daily.  Dispense: 90 tablet; Refill: 1    Restless legs  -     primidone (MYSOLINE) 50 MG Tab; TAKE 2 TABLETS BY MOUTH EVERY MORNING AND TAKE 3 TABLETS BY MOUTH EVERY EVENING  Dispense: 450 tablet; Refill: 1  -     pramipexole (MIRAPEX) 0.25 MG tablet; Take 1 tablet (0.25 mg total) by mouth 3 (three) times daily.  Dispense: 90 tablet; Refill: 2    GERD without esophagitis  -     omeprazole (PRILOSEC) 20 MG capsule; Take 1 capsule (20 mg total) by mouth once daily.  Dispense: 90 capsule; Refill: 1    Essential hypertension  -     losartan (COZAAR) 100 MG tablet; Take 1 tablet (100 mg total) by mouth once daily.  Dispense: 90 tablet; Refill: 1    Primary osteoarthritis of right hip  -     ipratropium (ATROVENT) 42 mcg (0.06 %) nasal spray; 2 sprays by Each Nostril route 4 (four) times daily.  Dispense: 15 mL; Refill: 4    Bilateral chronic serous otitis media  -     ipratropium (ATROVENT) 42 mcg (0.06 %) nasal spray; 2 sprays by Each Nostril route 4 (four) times daily.  Dispense: 15 mL; Refill: 4    Edema, unspecified type  -     furosemide (LASIX) 20 MG tablet; Take 1 tablet (20 mg total) by mouth once daily.  Dispense: 90 tablet; Refill: 1    Primary osteoarthritis of both hips  -     diclofenac sodium (VOLTAREN) 1 % Gel; Apply 2 g topically once daily.  Dispense: 450 g; Refill: 3    Hemiplegia and hemiparesis following cerebral infarction affecting right dominant side       Health Maintenance Topics with due status: Not Due       Topic Last Completion Date    DEXA Scan 07/27/2023    Lipid Panel 07/26/2024         Future Appointments   Date Time Provider Department Center   5/13/2025   8:20 AM Demi Harris FNP [facility] [place] [facility] [place]   6/30/2025 10:00 AM [facility]    8/11/2025  8:00 AM AWV NURSE, [facility] FAMILY MEDICINE [facility] [place]   9/15/2025  2:00 PM Ibis Devi [facility] A[place]        No follow-ups on file.    Health Maintenance Due   Topic Date Due    TETANUS VACCINE  Never done    RSV Vaccine (Age 60+ and Pregnant patients) (1 - 1-dose 75+ series) Never done    COVID-19 Vaccine (1 - 2024-25 season) Never done        Signature:  KEDAR Diaz    Date of encounter: 2/12/25  This note was generated with the assistance of ambient listening technology. Verbal consent was obtained by the patient and accompanying visitor(s) for the recording of patient appointment to facilitate this note. I attest to having reviewed and edited the generated note for accuracy, though some syntax or spelling errors may persist. Please contact the author of this note for any clarification.

## 2025-02-19 ENCOUNTER — TELEPHONE (OUTPATIENT)
Dept: FAMILY MEDICINE | Facility: CLINIC | Age: 80
End: 2025-02-19
Payer: MEDICARE

## 2025-02-19 DIAGNOSIS — J01.00 ACUTE NON-RECURRENT MAXILLARY SINUSITIS: Primary | ICD-10-CM

## 2025-02-19 RX ORDER — AZITHROMYCIN 250 MG/1
TABLET, FILM COATED ORAL
Qty: 6 TABLET | Refills: 0 | Status: SHIPPED | OUTPATIENT
Start: 2025-02-19 | End: 2025-02-24

## 2025-02-19 NOTE — TELEPHONE ENCOUNTER
Pt called and said that she needed something for her sinuses. I told the provider was gone for the day that I would have to see what she needed to do in the morning.

## 2025-04-04 DIAGNOSIS — M16.11 PRIMARY OSTEOARTHRITIS OF RIGHT HIP: ICD-10-CM

## 2025-04-04 DIAGNOSIS — H65.23 BILATERAL CHRONIC SEROUS OTITIS MEDIA: ICD-10-CM

## 2025-04-04 RX ORDER — IPRATROPIUM BROMIDE 42 UG/1
2 SPRAY, METERED NASAL 4 TIMES DAILY
Qty: 15 ML | Refills: 4 | Status: SHIPPED | OUTPATIENT
Start: 2025-04-04

## 2025-05-01 DIAGNOSIS — M79.641 BILATERAL HAND PAIN: Primary | ICD-10-CM

## 2025-05-01 DIAGNOSIS — M79.642 BILATERAL HAND PAIN: Primary | ICD-10-CM

## 2025-05-08 ENCOUNTER — OFFICE VISIT (OUTPATIENT)
Dept: ORTHOPEDICS | Facility: CLINIC | Age: 80
End: 2025-05-08
Payer: MEDICARE

## 2025-05-08 VITALS — BODY MASS INDEX: 30.76 KG/M2 | WEIGHT: 196 LBS | HEIGHT: 67 IN

## 2025-05-08 DIAGNOSIS — M19.042 PRIMARY OSTEOARTHRITIS OF BOTH HANDS: ICD-10-CM

## 2025-05-08 DIAGNOSIS — M65.342 TRIGGER RING FINGER OF LEFT HAND: Primary | ICD-10-CM

## 2025-05-08 DIAGNOSIS — M19.041 PRIMARY OSTEOARTHRITIS OF BOTH HANDS: ICD-10-CM

## 2025-05-08 PROCEDURE — 20550 NJX 1 TENDON SHEATH/LIGAMENT: CPT | Mod: PBBFAC,LT

## 2025-05-08 PROCEDURE — 99213 OFFICE O/P EST LOW 20 MIN: CPT | Mod: S$PBB,25,,

## 2025-05-08 PROCEDURE — 20600 DRAIN/INJ JOINT/BURSA W/O US: CPT | Mod: PBBFAC,LT

## 2025-05-08 PROCEDURE — 99999PBSHW PR PBB SHADOW TECHNICAL ONLY FILED TO HB: Mod: PBBFAC,,,

## 2025-05-08 PROCEDURE — 99999 PR PBB SHADOW E&M-EST. PATIENT-LVL III: CPT | Mod: PBBFAC,,,

## 2025-05-08 PROCEDURE — 99213 OFFICE O/P EST LOW 20 MIN: CPT | Mod: PBBFAC,25

## 2025-05-08 RX ADMIN — BUPIVACAINE HYDROCHLORIDE 0.5 ML: 2.5 INJECTION, SOLUTION EPIDURAL; INFILTRATION; INTRACAUDAL; PERINEURAL at 11:05

## 2025-05-08 RX ADMIN — TRIAMCINOLONE ACETONIDE 0.5 ML: 40 INJECTION, SUSPENSION INTRA-ARTICULAR; INTRAMUSCULAR at 11:05

## 2025-05-08 NOTE — PROCEDURES
Small Joint Aspiration/Injection: L thumb CMC    Date/Time: 5/8/2025 11:00 AM    Performed by: Frances Morrison PA  Authorized by: Frances Morrison PA    Consent Done?:  Yes (Verbal)  Indications:  Arthritis  Site marked: the procedure site was marked    Location:  Thumb  Site:  L thumb CMC  Needle size:  25 G  Medications:  0.5 mL BUPivacaine (PF) 0.25% (2.5 mg/ml) 0.25 % (2.5 mg/mL); 0.5 mL triamcinolone acetonide 40 mg/mL  Patient tolerance:  Patient tolerated the procedure well with no immediate complications  Tendon Sheath    Date/Time: 5/8/2025 11:00 AM    Performed by: Frances Morrison PA  Authorized by: Frances Morrison PA    Consent Done?:  Yes (Verbal)  Indications:  Pain  Location:  Ring finger  Site:  L ring flexor tendon sheath  Needle size:  25 G  Approach:  [place]olar  Medications:  0.5 mL BUPivacaine (PF) 0.25% (2.5 mg/ml) 0.25 % (2.5 mg/mL); 0.5 mL triamcinolone acetonide 40 mg/mL  Patient tolerance:  Patient tolerated the procedure well with no immediate complications

## 2025-05-13 ENCOUNTER — RESULTS FOLLOW-UP (OUTPATIENT)
Dept: FAMILY MEDICINE | Facility: CLINIC | Age: 80
End: 2025-05-13
Payer: MEDICARE

## 2025-05-13 ENCOUNTER — OFFICE VISIT (OUTPATIENT)
Dept: FAMILY MEDICINE | Facility: CLINIC | Age: 80
End: 2025-05-13
Payer: MEDICARE

## 2025-05-13 DIAGNOSIS — G25.81 RESTLESS LEGS: ICD-10-CM

## 2025-05-13 DIAGNOSIS — G89.29 CHRONIC BILATERAL LOW BACK PAIN WITH RIGHT-SIDED SCIATICA: Primary | ICD-10-CM

## 2025-05-13 DIAGNOSIS — R60.0 LOCALIZED EDEMA: ICD-10-CM

## 2025-05-13 DIAGNOSIS — M81.0 AGE-RELATED OSTEOPOROSIS WITHOUT CURRENT PATHOLOGICAL FRACTURE: ICD-10-CM

## 2025-05-13 DIAGNOSIS — M54.41 CHRONIC BILATERAL LOW BACK PAIN WITH RIGHT-SIDED SCIATICA: Primary | ICD-10-CM

## 2025-05-13 LAB
ANION GAP SERPL CALCULATED.3IONS-SCNC: 14 MMOL/L (ref 7–16)
BUN SERPL-MCNC: 11 MG/DL (ref 10–20)
BUN/CREAT SERPL: 14 (ref 6–20)
CALCIUM SERPL-MCNC: 9.2 MG/DL (ref 8.4–10.2)
CHLORIDE SERPL-SCNC: 94 MMOL/L (ref 98–107)
CO2 SERPL-SCNC: 26 MMOL/L (ref 23–31)
CREAT SERPL-MCNC: 0.78 MG/DL (ref 0.55–1.02)
EGFR (NO RACE VARIABLE) (RUSH/TITUS): 76 ML/MIN/1.73M2
GLUCOSE SERPL-MCNC: 86 MG/DL (ref 82–115)
NT-PROBNP SERPL-MCNC: 583 PG/ML (ref 1–450)
POTASSIUM SERPL-SCNC: 4.5 MMOL/L (ref 3.5–5.1)
SODIUM SERPL-SCNC: 129 MMOL/L (ref 136–145)

## 2025-05-13 PROCEDURE — 80048 BASIC METABOLIC PNL TOTAL CA: CPT | Mod: ,,, | Performed by: CLINICAL MEDICAL LABORATORY

## 2025-05-13 PROCEDURE — 96372 THER/PROPH/DIAG INJ SC/IM: CPT | Mod: ,,, | Performed by: NURSE PRACTITIONER

## 2025-05-13 PROCEDURE — 3288F FALL RISK ASSESSMENT DOCD: CPT | Mod: ,,, | Performed by: NURSE PRACTITIONER

## 2025-05-13 PROCEDURE — 1125F AMNT PAIN NOTED PAIN PRSNT: CPT | Mod: ,,, | Performed by: NURSE PRACTITIONER

## 2025-05-13 PROCEDURE — 1159F MED LIST DOCD IN RCRD: CPT | Mod: ,,, | Performed by: NURSE PRACTITIONER

## 2025-05-13 PROCEDURE — 3075F SYST BP GE 130 - 139MM HG: CPT | Mod: ,,, | Performed by: NURSE PRACTITIONER

## 2025-05-13 PROCEDURE — 83880 ASSAY OF NATRIURETIC PEPTIDE: CPT | Mod: ,,, | Performed by: CLINICAL MEDICAL LABORATORY

## 2025-05-13 PROCEDURE — 3078F DIAST BP <80 MM HG: CPT | Mod: ,,, | Performed by: NURSE PRACTITIONER

## 2025-05-13 PROCEDURE — 1160F RVW MEDS BY RX/DR IN RCRD: CPT | Mod: ,,, | Performed by: NURSE PRACTITIONER

## 2025-05-13 PROCEDURE — 99214 OFFICE O/P EST MOD 30 MIN: CPT | Mod: 25,,, | Performed by: NURSE PRACTITIONER

## 2025-05-13 PROCEDURE — 1101F PT FALLS ASSESS-DOCD LE1/YR: CPT | Mod: ,,, | Performed by: NURSE PRACTITIONER

## 2025-05-13 RX ORDER — DEXAMETHASONE SODIUM PHOSPHATE 4 MG/ML
4 INJECTION, SOLUTION INTRA-ARTICULAR; INTRALESIONAL; INTRAMUSCULAR; INTRAVENOUS; SOFT TISSUE
Status: COMPLETED | OUTPATIENT
Start: 2025-05-13 | End: 2025-05-13

## 2025-05-13 RX ORDER — PRAMIPEXOLE DIHYDROCHLORIDE 0.5 MG/1
0.5 TABLET ORAL 3 TIMES DAILY
Qty: 90 TABLET | Refills: 3 | Status: SHIPPED | OUTPATIENT
Start: 2025-05-13 | End: 2026-05-13

## 2025-05-13 RX ORDER — METHYLPREDNISOLONE ACETATE 40 MG/ML
40 INJECTION, SUSPENSION INTRA-ARTICULAR; INTRALESIONAL; INTRAMUSCULAR; SOFT TISSUE
Status: COMPLETED | OUTPATIENT
Start: 2025-05-13 | End: 2025-05-13

## 2025-05-13 RX ADMIN — DEXAMETHASONE SODIUM PHOSPHATE 4 MG: 4 INJECTION, SOLUTION INTRA-ARTICULAR; INTRALESIONAL; INTRAMUSCULAR; INTRAVENOUS; SOFT TISSUE at 09:05

## 2025-05-13 RX ADMIN — METHYLPREDNISOLONE ACETATE 40 MG: 40 INJECTION, SUSPENSION INTRA-ARTICULAR; INTRALESIONAL; INTRAMUSCULAR; SOFT TISSUE at 09:05

## 2025-05-13 NOTE — PROGRESS NOTES
"   KEDAR Diaz   [facility]  [address]  [place] [address]     PATIENT NAME: Felicia Keita  : 1945  DATE: 25  MRN: [id number]      Billing Provider: KEDAR Diaz  Level of Service:   Patient PCP Information       Provider PCP Type    KEDAR Diaz General            Reason for Visit / Chief Complaint: Follow-up (Mrs.Juanita Keita is a 80 y.o. female who presents to the clinic today for a 3 month f/u. ), Health Maintenance (TETANUS VACCINE Never done- declined/RSV Vaccine (Age 60+ and Pregnant patients)(1 - 1-dose 75+ series) Never done - declined/COVID-19 Vaccine( season) Never done- declined/DEXA Scan due on 2025 - declined), and Back Pain (Pt states she is having back pain that radiates down right hip and right leg. Wants something for this.)   Health Maintenance Due   Topic Date Due    TETANUS VACCINE  Never done    RSV Vaccine (Age 60+ and Pregnant patients) (1 - 1-dose 75+ series) Never done    COVID-19 Vaccine (1 - 2024-25 season) Never done    DEXA Scan  2025          Update PCP  Update Chief Complaint         History of Present Illness / Problem Focused Workflow     History of Present Illness    CHIEF COMPLAINT:  Patient presents today for f/u and with back, hip and leg pain    RESTLESS LEG SYNDROME:  She reports inadequate symptom control with current medication regimen of 0.25mg 3 times daily (morning, noon, and night). She describes her legs as "unsteady" with continued involuntary movements while in bed.    LOWER EXTREMITY EDEMA:  She reports worsening ankle swelling, particularly at night. Due to increased edema, she has increased Lasix from every other day to daily for the past month.    RESPIRATORY:  She experiences shortness of breath with any amount of walking. She uses an inhaler and nebulizer treatments, both providing significant symptom relief.    VASCULAR HISTORY:  She has a history of deep vein thrombosis that resulted in a " pulmonary embolism following trauma to the affected leg, requiring a 14-day hospitalization. She also has varicose veins in both legs, previously treated with laser surgery by Dr. Thibodeaux.      ROS:  General: -fever, -chills, -fatigue, -weight gain, -weight loss  Eyes: -vision changes, -redness, -discharge  ENT: -ear pain, -nasal congestion, -sore throat  Cardiovascular: -chest pain, -palpitations, +lower extremity edema  Respiratory: -cough, -shortness of breath, +exertional dyspnea  Gastrointestinal: -abdominal pain, -nausea, -vomiting, -diarrhea, -constipation, -blood in stool  Genitourinary: -dysuria, -hematuria, -frequency  Musculoskeletal: +joint pain, -muscle pain, +back pain, +limb pain  Skin: -rash, -lesion  Neurological: -headache, -dizziness, -numbness, -tingling, +restless legs  Psychiatric: -anxiety, -depression, -sleep difficulty          Hemoglobin A1C   Date Value Ref Range Status   09/26/2023 5.5 4.5 - 6.6 % Final     Comment:       Normal:               <5.7%  Pre-Diabetic:       5.7% to 6.4%  Diabetic:             >6.4%  Diabetic Goal:     <7%   07/20/2022 5.6 4.5 - 6.6 % Final     Comment:       Normal:               <5.7%  Pre-Diabetic:       5.7% to 6.4%  Diabetic:             >6.4%  Diabetic Goal:     <7%        CMP  Sodium   Date Value Ref Range Status   02/12/2025 139 136 - 145 mmol/L Final     Potassium   Date Value Ref Range Status   02/12/2025 4.9 3.5 - 5.1 mmol/L Final     Chloride   Date Value Ref Range Status   02/12/2025 100 98 - 107 mmol/L Final     CO2   Date Value Ref Range Status   02/12/2025 30 23 - 31 mmol/L Final     Glucose   Date Value Ref Range Status   02/12/2025 91 82 - 115 mg/dL Final     BUN   Date Value Ref Range Status   02/12/2025 17 10 - 20 mg/dL Final     Creatinine   Date Value Ref Range Status   02/12/2025 0.94 0.55 - 1.02 mg/dL Final     Calcium   Date Value Ref Range Status   02/12/2025 9.6 8.4 - 10.2 mg/dL Final     Total Protein   Date Value Ref Range Status    02/12/2025 6.7 5.8 - 7.6 g/dL Final     Albumin   Date Value Ref Range Status   02/12/2025 3.8 3.4 - 4.8 g/dL Final     Bilirubin, Total   Date Value Ref Range Status   02/12/2025 0.3 <=1.5 mg/dL Final     Alk Phos   Date Value Ref Range Status   02/12/2025 82 40 - 150 U/L Final     AST   Date Value Ref Range Status   02/12/2025 65 (H) 5 - 34 U/L Final     ALT   Date Value Ref Range Status   02/12/2025 52 <=55 U/L Final     Anion Gap   Date Value Ref Range Status   02/12/2025 14 7 - 16 mmol/L Final     eGFR   Date Value Ref Range Status   02/12/2025 61 >=60 mL/min/1.73m2 Final     Comment:     Estimated GFR calculated using the CKD-EPI creatinine (2021) equation.        Lab Results   Component Value Date    WBC 6.01 02/12/2025    RBC 4.56 02/12/2025    HGB 12.7 02/12/2025    HCT 41.0 02/12/2025    MCV 89.9 02/12/2025    MCH 27.9 02/12/2025    MCHC 31.0 (L) 02/12/2025    RDW 15.3 (H) 02/12/2025     02/12/2025    MPV 11.6 02/12/2025    LYMPH 31.4 02/12/2025    LYMPH 1.89 02/12/2025    MONO 9.0 (H) 02/12/2025    EOS 0.21 02/12/2025    BASO 0.09 02/12/2025    EOSINOPHIL 3.5 02/12/2025    BASOPHIL 1.5 (H) 02/12/2025        Lab Results   Component Value Date    CHOL 165 02/12/2025    CHOL 157 07/26/2024    CHOL 168 04/25/2024     Lab Results   Component Value Date    HDL 71 (H) 02/12/2025    HDL 96 (H) 07/26/2024    HDL 88 (H) 04/25/2024     Lab Results   Component Value Date    LDLCALC 78 02/12/2025    LDLCALC 50 07/26/2024    LDLCALC 67 04/25/2024     Lab Results   Component Value Date    TRIG 82 02/12/2025    TRIG 54 07/26/2024    TRIG 63 04/25/2024     Lab Results   Component Value Date    CHOLHDL 2.3 02/12/2025    CHOLHDL 1.6 07/26/2024    CHOLHDL 1.9 04/25/2024        Wt Readings from Last 3 Encounters:   05/13/25 0818 91.3 kg (201 lb 3.2 oz)   05/08/25 1103 88.9 kg (195 lb 15.8 oz)   12/30/24 0905 88.9 kg (196 lb)        BP Readings from Last 3 Encounters:   05/13/25 138/70   12/30/24 135/78   12/10/24  139/65        Review of Systems     Review of Systems       Medical / Social / Family History     Past Medical History:   Diagnosis Date    Abnormal gait 05/22/2013    Actinic keratosis 08/12/2020    L forearm     Acute cough 03/30/2023    Allergic rhinitis 04/07/2020    Bilateral chronic serous otitis media 05/22/2024    Blepharophimosis 04/22/2014    senile    Cervical somatic dysfunction 08/15/2017    Cervicalgia 03/28/2019    Chronic peripheral venous hypertension 06/16/2015    Chronic serous otitis media, bilateral 09/05/2019    Chronic venous hypertension (idiopathic) without complications of unspecified lower extremity 08/15/2017    Conjunctival hemorrhage, right eye 11/02/2020    Deep venous thrombosis of lower extremity 09/17/2012    Degeneration of cervical intervertebral disc 10/21/2011    Degeneration of lumbar intervertebral disc 10/21/2011    Degenerative joint disease involving multiple joints 07/25/2011    Depressive disorder 07/25/2011    Essential hypertension 08/12/2020    Essential tremor 06/24/2014    GERD without esophagitis 08/15/2017    Headache 04/07/2020    Hyperlipidemia 05/08/2012    Insomnia 04/06/2016    Osteoarthritis 01/04/2017    Osteoporosis 11/07/2017    Otalgia, right ear 10/17/2016    Other cervical disc degeneration, unspecified cervical region 08/15/2017    Overflow incontinence 01/06/2020    Pain in right knee 01/06/2020    osteoarthritis    Pain in right leg 09/10/2020    Peripheral arterial occlusive disease 02/24/2015    Peripheral vascular disease, unspecified 08/06/2019    Peripheral venous insufficiency 09/29/2015    Personal history of PE (pulmonary embolism) 08/06/2019    Presence of vena cava filter 03/2015    Pulmonary embolus 03/10/2015    Pulmonary infarction 03/16/2012    Pure hypercholesterolemia 07/25/2011    Restless legs 05/22/2018    Right temporomandibular joint disorder, unspecified 08/18/2020    Rosacea 07/01/2015    Sciatica, right side 07/06/2016     Seborrheic keratosis 01/07/2021    Segmental and somatic dysfunction of thoracic region 03/28/2019    Senile osteoporosis 04/24/2017    Spasm of back muscles 07/25/2011    Stroke 09/25/2023    Trochanteric bursitis of right hip 01/06/2020    Unspecified urinary incontinence 02/07/2020    Vitamin D deficiency 10/27/2014       Past Surgical History:   Procedure Laterality Date    APPENDECTOMY      bone density  06/27/2019    Ajith/Abiel    BREAST SURGERY Left 2013    approximate    CARPAL TUNNEL RELEASE      BLI    LUMBAR LAMINECTOMY      prolia  03/12/2019    1mg    remove cataract Right     insert lens    TONSILLECTOMY      TOTAL ABDOMINAL HYSTERECTOMY      VAGINAL DELIVERY      vascular surgery procedure       Right SSV Laser Ablation performed by Dr. Carlo hernandez screen  05/24/2018       Social History  Ms.  reports that she has never smoked. She has never been exposed to tobacco smoke. She has never used smokeless tobacco. She reports that she does not drink alcohol and does not use drugs.    Family History  Ms.'s family history includes COPD in her daughter and sister; Cancer in her brother; Emphysema in her father; Hearing loss in her father; Hypertension in her father and sister; Melanoma in her brother; Rheum arthritis in her daughter and sister; Skin cancer in her father; Stomach cancer in her sister.    Medications and Allergies     Medications  Outpatient Medications Marked as Taking for the 5/13/25 encounter (Office Visit) with Demi Harris FNP   Medication Sig Dispense Refill    albuterol (PROVENTIL/VENTOLIN HFA) 90 mcg/actuation inhaler Inhale 1-2 puffs into the lungs every 4 (four) hours as needed for Wheezing. Rescue 18 g 3    albuterol-ipratropium (DUO-NEB) 2.5 mg-0.5 mg/3 mL nebulizer solution Take 3 mLs by nebulization every 6 (six) hours as needed for Wheezing. Rescue 100 mL 4    amLODIPine (NORVASC) 5 MG tablet Take 1 tablet (5 mg total) by mouth 2 (two) times daily. 180 tablet 1     ascorbic acid, vitamin C, (VITAMIN C) 500 MG tablet Take 500 mg by mouth once daily.      aspirin (ECOTRIN) 81 MG EC tablet Take 1 tablet (81 mg total) by mouth once daily. 30 tablet 0    atorvastatin (LIPITOR) 80 MG tablet Take 1 tablet (80 mg total) by mouth once daily. 90 tablet 1    calcium carbonate-vitamin D3 1,000 mg-20 mcg (800 unit) Tab Take 1 each by mouth Daily. 90 tablet 1    celecoxib (CELEBREX) 200 MG capsule Take 1 capsule (200 mg total) by mouth once daily. 90 capsule 1    cetirizine (ZYRTEC) 10 MG tablet Take 1 tablet (10 mg total) by mouth once daily. 30 tablet 0    denosumab (PROLIA) 60 mg/mL Syrg as directed Subcutaneous      desvenlafaxine succinate (PRISTIQ) 100 MG Tb24 Take 1 tablet (100 mg total) by mouth once daily. 90 tablet 1    diaper,brief,adult,disposable Misc 1 each by Misc.(Non-Drug; Combo Route) route continuous prn (prn). 120 each 5    diclofenac sodium (VOLTAREN) 1 % Gel Apply 2 g topically once daily. 450 g 3    furosemide (LASIX) 20 MG tablet Take 1 tablet (20 mg total) by mouth once daily. 90 tablet 1    gabapentin (NEURONTIN) 300 MG capsule TAKE 1 CAPSULE BY MOUTH IN THE MORNING, TAKE 1 CAPSULE at LUNCH, AND TAKE 2 CAPSULES AT BEDTIME 380 capsule 1    HYDROcodone-acetaminophen (NORCO)  mg per tablet 05/27/21 TAKE 1 TABLET BY MOUTH EVERY 8 HOURS AS NEEDED      ipratropium (ATROVENT) 42 mcg (0.06 %) nasal spray 2 sprays by Each Nostril route 4 (four) times daily. 15 mL 4    losartan (COZAAR) 100 MG tablet Take 1 tablet (100 mg total) by mouth once daily. 90 tablet 1    omeprazole (PRILOSEC) 20 MG capsule Take 1 capsule (20 mg total) by mouth once daily. 90 capsule 1    primidone (MYSOLINE) 50 MG Tab TAKE 2 TABLETS BY MOUTH EVERY MORNING AND TAKE 3 TABLETS BY MOUTH EVERY EVENING 450 tablet 1    propranoloL (INDERAL) 40 MG tablet Take 1 tablet (40 mg total) by mouth 2 (two) times daily. 180 tablet 1    rivaroxaban (XARELTO) 20 mg Tab Take 1 tablet (20 mg total) by mouth once  "daily. 90 tablet 1    senna-docusate 8.6-50 mg (PERICOLACE) 8.6-50 mg per tablet Take 1 tablet by mouth once daily.      tiZANidine (ZANAFLEX) 4 MG tablet Take 1 tablet (4 mg total) by mouth every 12 (twelve) hours as needed (for muscle spasms). 30 tablet 1    zinc gluconate 50 mg tablet Take 50 mg by mouth once daily.      [DISCONTINUED] pramipexole (MIRAPEX) 0.25 MG tablet Take 1 tablet (0.25 mg total) by mouth 3 (three) times daily. 90 tablet 2     Current Facility-Administered Medications for the 5/13/25 encounter (Office Visit) with Demi Harris FNP   Medication Dose Route Frequency Provider Last Rate Last Admin    [COMPLETED] dexAMETHasone injection 4 mg  4 mg Intramuscular 1 time in Clinic/HOD Demi Harris FNP   4 mg at 05/13/25 0903    [COMPLETED] methylPREDNISolone acetate injection 40 mg  40 mg Intramuscular 1 time in Clinic/HOD Demi Harris FNP   40 mg at 05/13/25 0905       Allergies  Review of patient's allergies indicates:  No Known Allergies    Physical Examination     Vitals:    05/13/25 0818 05/13/25 0852   BP: (!) 147/78 138/70   Pulse: 64    Resp: 20    Temp: 98.3 °F (36.8 °C)    TempSrc: Oral    SpO2: 99%    Weight: 91.3 kg (201 lb 3.2 oz)    Height: 5' 7" (1.702 m)       Physical Exam  Constitutional:       Appearance: Normal appearance. She is obese.   HENT:      Head: Normocephalic.   Eyes:      Extraocular Movements: Extraocular movements intact.   Cardiovascular:      Rate and Rhythm: Normal rate and regular rhythm.      Pulses: Normal pulses.      Heart sounds: Normal heart sounds.      Comments: Trace edema to BLE  Pulmonary:      Effort: Pulmonary effort is normal.      Breath sounds: Normal breath sounds.   Musculoskeletal:      Right lower leg: Edema present.      Left lower leg: Edema present.   Skin:     General: Skin is warm and dry.      Capillary Refill: Capillary refill takes less than 2 seconds.   Neurological:      General: No focal deficit present.      Mental Status: She is " "alert and oriented to person, place, and time.   Psychiatric:         Mood and Affect: Mood normal.         Behavior: Behavior normal.          Assessment and Plan (including Health Maintenance)      Problem List  Smart Sets  Document Outside HM   :    Plan:     There are no Patient Instructions on file for this visit.       Health Maintenance Due   Topic Date Due    TETANUS VACCINE  Never done    RSV Vaccine (Age 60+ and Pregnant patients) (1 - 1-dose 75+ series) Never done    COVID-19 Vaccine (1 - 2024-25 season) Never done    DEXA Scan  07/27/2025       Problem List Items Addressed This Visit       Back pain - Primary    Relevant Medications    dexAMETHasone injection 4 mg (Completed)    methylPREDNISolone acetate injection 40 mg (Completed)    Edema    Relevant Orders    Basic Metabolic Panel    NT-Pro Natriuretic Peptide    Osteoporosis    Relevant Orders    DXA Bone Density Axial Skeleton 1 or more sites    Restless legs    Relevant Medications    pramipexole (MIRAPEX) 0.5 MG tablet     Assessment & Plan    G25.81 Restless legs syndrome  R60.9 Edema, unspecified  R06.02 Shortness of breath  I83.93 Asymptomatic varicose veins of bilateral lower extremities  Z86.711 Personal history of pulmonary embolism  Z79.51 Long term (current) use of inhaled steroids    IMPRESSION:  - Assessed back, hip, and leg pain; administered steroid injections (short and long-acting).  - Evaluated ankle swelling; no pitting edema.  - Evaluated shortness of breath; current inhaler regimen is sufficient and is not new.  - Ruled out need for chest XR based on response.  - Discussed weight management options but determined current interventions are not suitable.    ## RESTLESS LEGS SYNDROME:  - Increased Mirapex dosage from 0.25 mg to 0.5 mg 3 times daily due to patient reporting legs "jumping like crazy" despite current regimen.  - This dosage adjustment aims to provide better symptom control.    ## EDEMA:  - Continued Lasix daily for " ankle swelling that worsens at nightfall.  - Physical exam revealed no pitting edema.  - Patient has history of blood clot in the left leg which may contribute to swelling.  - Ordered potassium level test to monitor for Lasix-induced depletion and BNP test to evaluate for possible fluid retention around the heart.    ## SHORTNESS OF BREATH:  - Patient reports dyspnea with ambulation that is alleviated by using a puffer and nebulizer.  - Considered chest XR to rule out other causes of shortness of breath. Pt declines at this time.   - Advised to continue using puffer and nebulizer as needed for symptom relief.    ## EXERCISE PLAN:  - Provided instruction sheet for chair exercises appropriate for limited mobility.  - Recommend performing these exercises as tolerated, with option to incorporate small weights (2.5-5 lbs) for upper body strengthening while seated.        Chronic bilateral low back pain with right-sided sciatica  -     dexAMETHasone injection 4 mg  -     methylPREDNISolone acetate injection 40 mg    Restless legs  -     pramipexole (MIRAPEX) 0.5 MG tablet; Take 1 tablet (0.5 mg total) by mouth 3 (three) times daily.  Dispense: 90 tablet; Refill: 3    Localized edema  -     Basic Metabolic Panel; Future; Expected date: 05/13/2025  -     NT-Pro Natriuretic Peptide; Future; Expected date: 05/13/2025    Age-related osteoporosis without current pathological fracture  -     DXA Bone Density Axial Skeleton 1 or more sites; Future; Expected date: 05/13/2025       Health Maintenance Topics with due status: Not Due       Topic Last Completion Date    Lipid Panel 02/12/2025         Future Appointments   Date Time Provider Department Center   5/22/2025 10:40 AM Frances Morrison PA [facility] ORTHO [facility]   6/30/2025 10:00 AM INFUSION, [facility] INFUSN [place] [facility]    7/28/2025 10:30 AM [facility] X[facility]EX[facility]    8/11/2025  8:00 AM AWV NURSE, [facility]ILY MEDICINE [facility] [place]    9/15/2025  2:00 PM Ibis Devi [facility] AUDIO [facility]   11/13/2025  8:20 AM Demi Harris FNP [facility]        No follow-ups on file.    Health Maintenance Due   Topic Date Due    TETANUS VACCINE  Never done    RSV Vaccine (Age 60+ and Pregnant patients) (1 - 1-dose 75+ series) Never done    COVID-19 Vaccine (1 - 2024-25 season) Never done    DEXA Scan  07/27/2025        Signature:  KEDAR Diaz    Date of encounter: 5/13/25  This note was generated with the assistance of ambient listening technology. Verbal consent was obtained by the patient and accompanying visitor(s) for the recording of patient appointment to facilitate this note. I attest to having reviewed and edited the generated note for accuracy, though some syntax or spelling errors may persist. Please contact the author of this note for any clarification.

## 2025-05-14 NOTE — PROGRESS NOTES
"Please let pt know labs are good other than her sodium is low. She said she had to increase her lasix to daily and this could be the reason. I would just tell her to take this as needed and increase her water intake. This should help. The lab I checked for her "heart" was just slightly elevated which as has been higher in the past. Would she be okay with home health coming out to check on her weekly? If so we can get this started and I can have them recheck her levels in a week. If not, I'll just get her to come by the clinic one day to have them rechecked. Thanks!  "

## 2025-05-16 VITALS
TEMPERATURE: 98 F | RESPIRATION RATE: 20 BRPM | DIASTOLIC BLOOD PRESSURE: 55 MMHG | HEIGHT: 67 IN | SYSTOLIC BLOOD PRESSURE: 136 MMHG | BODY MASS INDEX: 31.58 KG/M2 | WEIGHT: 201.19 LBS | HEART RATE: 64 BPM | OXYGEN SATURATION: 99 %

## 2025-05-19 RX ORDER — BUPIVACAINE HYDROCHLORIDE 2.5 MG/ML
0.5 INJECTION, SOLUTION EPIDURAL; INFILTRATION; INTRACAUDAL; PERINEURAL
Status: DISCONTINUED | OUTPATIENT
Start: 2025-05-08 | End: 2025-05-19 | Stop reason: HOSPADM

## 2025-05-19 RX ORDER — TRIAMCINOLONE ACETONIDE 40 MG/ML
0.5 INJECTION, SUSPENSION INTRA-ARTICULAR; INTRAMUSCULAR
Status: DISCONTINUED | OUTPATIENT
Start: 2025-05-08 | End: 2025-05-19 | Stop reason: HOSPADM

## 2025-05-19 NOTE — PROGRESS NOTES
CC:   Chief Complaint   Patient presents with    Right Hand - Pain    Left Hand - Pain          Felicia Keita is a 80 y.o. female seen today for Pain of the Right Hand and Pain of the Left Hand  Patient seen by me last year for bilateral hand pain and trigger fingers. She presents today with complaints of return of trigger finger as well as thumb pain. She has degenerative changes bilateral hands on previous x-rays.  She received injections for trigger finger last visit which she reports helped for almost a year. She has return of sticking and triggering of fingers on both hands, mostly her ring fingers. Left worse than right. No new fall or injury. No other complaints today.       PAST MEDICAL HISTORY:   Past Medical History:   Diagnosis Date    Abnormal gait 05/22/2013    Actinic keratosis 08/12/2020    L forearm     Acute cough 03/30/2023    Allergic rhinitis 04/07/2020    Bilateral chronic serous otitis media 05/22/2024    Blepharophimosis 04/22/2014    senile    Cervical somatic dysfunction 08/15/2017    Cervicalgia 03/28/2019    Chronic peripheral venous hypertension 06/16/2015    Chronic serous otitis media, bilateral 09/05/2019    Chronic venous hypertension (idiopathic) without complications of unspecified lower extremity 08/15/2017    Conjunctival hemorrhage, right eye 11/02/2020    Deep venous thrombosis of lower extremity 09/17/2012    Degeneration of cervical intervertebral disc 10/21/2011    Degeneration of lumbar intervertebral disc 10/21/2011    Degenerative joint disease involving multiple joints 07/25/2011    Depressive disorder 07/25/2011    Essential hypertension 08/12/2020    Essential tremor 06/24/2014    GERD without esophagitis 08/15/2017    Headache 04/07/2020    Hyperlipidemia 05/08/2012    Insomnia 04/06/2016    Osteoarthritis 01/04/2017    Osteoporosis 11/07/2017    Otalgia, right ear 10/17/2016    Other cervical disc degeneration, unspecified cervical region 08/15/2017     Overflow incontinence 01/06/2020    Pain in right knee 01/06/2020    osteoarthritis    Pain in right leg 09/10/2020    Peripheral arterial occlusive disease 02/24/2015    Peripheral vascular disease, unspecified 08/06/2019    Peripheral venous insufficiency 09/29/2015    Personal history of PE (pulmonary embolism) 08/06/2019    Presence of vena cava filter 03/2015    Pulmonary embolus 03/10/2015    Pulmonary infarction 03/16/2012    Pure hypercholesterolemia 07/25/2011    Restless legs 05/22/2018    Right temporomandibular joint disorder, unspecified 08/18/2020    Rosacea 07/01/2015    Sciatica, right side 07/06/2016    Seborrheic keratosis 01/07/2021    Segmental and somatic dysfunction of thoracic region 03/28/2019    Senile osteoporosis 04/24/2017    Spasm of back muscles 07/25/2011    Stroke 09/25/2023    Trochanteric bursitis of right hip 01/06/2020    Unspecified urinary incontinence 02/07/2020    Vitamin D deficiency 10/27/2014          PAST SURGICAL HISTORY:   Past Surgical History:   Procedure Laterality Date    APPENDECTOMY      bone density  06/27/2019    Ajith/Abiel    BREAST SURGERY Left 2013    approximate    CARPAL TUNNEL RELEASE      BLI    LUMBAR LAMINECTOMY      prolia  03/12/2019    1mg    remove cataract Right     insert lens    TONSILLECTOMY      TOTAL ABDOMINAL HYSTERECTOMY      VAGINAL DELIVERY      vascular surgery procedure       Right SSV Laser Ablation performed by Dr. Carlo hernandez screen  05/24/2018          ALLERGIES: Review of patient's allergies indicates:  No Known Allergies     MEDICATIONS :  Current Medications[1]     SOCIAL HISTORY: Social History[2]     FAMILY HISTORY:   Family History   Problem Relation Name Age of Onset    Hearing loss Father      Hypertension Father      Emphysema Father      Skin cancer Father      Stomach cancer Sister      Hypertension Sister      COPD Sister      Rheum arthritis Sister      Cancer Brother      Melanoma Brother      COPD Daughter       Rheum arthritis Daughter            PHYSICAL EXAM:      There were no vitals filed for this visit.  Body mass index is 30.7 kg/m².    GENERAL: Well-developed, well-nourished female . The patient is alert, oriented and cooperative.    HEENT:  Normocephalic, atraumatic.  Extraocular movements are intact bilaterally.     NECK:  Nontender with good range of motion.    LUNGS:  Clear to auscultation bilaterally.    HEART:  Regular rate and rhythm.     ABDOMEN:  Soft, non-tender, non-distended.      EXTREMITIES: hands with skin c/d/I, triggering of bilateral ring and long fingers, positive apply grind testing bilateral thumbs, NVI      RADIOGRAPHIC FINDINGS:   No results found.     Patient Active Problem List    Diagnosis Date Noted    Edema 02/12/2025    Bilateral chronic serous otitis media 02/12/2025    Essential hypertension 02/12/2025    Acute laryngitis 10/17/2024    Cough 10/17/2024    Sorethroat 10/17/2024    Urinary tract infection without hematuria 08/07/2024    Dysuria 08/07/2024    Urinary incontinence without sensory awareness 08/06/2024    Right ear pain 08/06/2024    BMI 30.0-30.9,adult 08/06/2024    Abnormality of gait and mobility 08/06/2024    Encounter for subsequent annual wellness visit (AWV) in Medicare patient 08/06/2024    Skin lesion of back 07/31/2024    Neuropathy 07/31/2024    Hemiplegia and hemiparesis following cerebral infarction affecting right dominant side 05/22/2024    Chronic deep vein thrombosis (DVT) of other vein of left lower extremity 05/22/2024    Bilateral hand pain 05/22/2024    Hypertension 05/22/2024    Urine WBC increased 01/26/2024    Dizziness 09/27/2023    Essential tremor 09/25/2023    Recurrent deep vein thrombosis (DVT) 09/25/2023    Mass of right parotid gland 09/25/2023    Chronic pain syndrome 09/25/2023    Chronic obstructive pulmonary disease 07/24/2022    Back pain 07/14/2021    Osteoporosis 07/14/2021    Trochanteric bursitis of right hip 01/06/2020    Restless  legs 05/22/2018    GERD without esophagitis 08/15/2017    Osteoarthritis 01/04/2017    Hyperlipidemia 05/08/2012    Depression 07/25/2011     IMPRESSION AND PLAN:   Trigger finger bilateral ring fingers. Discussed injection today, see procedural note for details. She can return in 1-2 weeks for injection of other trigger finger. Otherwise injections can be repeated every 3-4 months if needed.   Osteoarthritis bilateral hands including 1st CMC joint. Injection of left thumb today, see procedural note for details. Discussed injections can be repeated every 3-4 months as needed. Follow up PRN.     No follow-ups on file.       Frances Morrison PA-C      (Subject to voice recognition error, transcription service not allowed)           [1]   Current Outpatient Medications:     albuterol (PROVENTIL/VENTOLIN HFA) 90 mcg/actuation inhaler, Inhale 1-2 puffs into the lungs every 4 (four) hours as needed for Wheezing. Rescue, Disp: 18 g, Rfl: 3    albuterol-ipratropium (DUO-NEB) 2.5 mg-0.5 mg/3 mL nebulizer solution, Take 3 mLs by nebulization every 6 (six) hours as needed for Wheezing. Rescue, Disp: 100 mL, Rfl: 4    amLODIPine (NORVASC) 5 MG tablet, Take 1 tablet (5 mg total) by mouth 2 (two) times daily., Disp: 180 tablet, Rfl: 1    ascorbic acid, vitamin C, (VITAMIN C) 500 MG tablet, Take 500 mg by mouth once daily., Disp: , Rfl:     aspirin (ECOTRIN) 81 MG EC tablet, Take 1 tablet (81 mg total) by mouth once daily., Disp: 30 tablet, Rfl: 0    atorvastatin (LIPITOR) 80 MG tablet, Take 1 tablet (80 mg total) by mouth once daily., Disp: 90 tablet, Rfl: 1    calcium carbonate-vitamin D3 1,000 mg-20 mcg (800 unit) Tab, Take 1 each by mouth Daily., Disp: 90 tablet, Rfl: 1    celecoxib (CELEBREX) 200 MG capsule, Take 1 capsule (200 mg total) by mouth once daily., Disp: 90 capsule, Rfl: 1    cetirizine (ZYRTEC) 10 MG tablet, Take 1 tablet (10 mg total) by mouth once daily., Disp: 30 tablet, Rfl: 0    denosumab (PROLIA) 60 mg/mL  Syrg, as directed Subcutaneous, Disp: , Rfl:     desvenlafaxine succinate (PRISTIQ) 100 MG Tb24, Take 1 tablet (100 mg total) by mouth once daily., Disp: 90 tablet, Rfl: 1    diaper,brief,adult,disposable Misc, 1 each by Misc.(Non-Drug; Combo Route) route continuous prn (prn)., Disp: 120 each, Rfl: 5    diclofenac sodium (VOLTAREN) 1 % Gel, Apply 2 g topically once daily., Disp: 450 g, Rfl: 3    furosemide (LASIX) 20 MG tablet, Take 1 tablet (20 mg total) by mouth once daily., Disp: 90 tablet, Rfl: 1    gabapentin (NEURONTIN) 300 MG capsule, TAKE 1 CAPSULE BY MOUTH IN THE MORNING, TAKE 1 CAPSULE at LUNCH, AND TAKE 2 CAPSULES AT BEDTIME, Disp: 380 capsule, Rfl: 1    HYDROcodone-acetaminophen (NORCO)  mg per tablet, 05/27/21 TAKE 1 TABLET BY MOUTH EVERY 8 HOURS AS NEEDED, Disp: , Rfl:     ipratropium (ATROVENT) 42 mcg (0.06 %) nasal spray, 2 sprays by Each Nostril route 4 (four) times daily., Disp: 15 mL, Rfl: 4    losartan (COZAAR) 100 MG tablet, Take 1 tablet (100 mg total) by mouth once daily., Disp: 90 tablet, Rfl: 1    omeprazole (PRILOSEC) 20 MG capsule, Take 1 capsule (20 mg total) by mouth once daily., Disp: 90 capsule, Rfl: 1    pramipexole (MIRAPEX) 0.5 MG tablet, Take 1 tablet (0.5 mg total) by mouth 3 (three) times daily., Disp: 90 tablet, Rfl: 3    primidone (MYSOLINE) 50 MG Tab, TAKE 2 TABLETS BY MOUTH EVERY MORNING AND TAKE 3 TABLETS BY MOUTH EVERY EVENING, Disp: 450 tablet, Rfl: 1    propranoloL (INDERAL) 40 MG tablet, Take 1 tablet (40 mg total) by mouth 2 (two) times daily., Disp: 180 tablet, Rfl: 1    rivaroxaban (XARELTO) 20 mg Tab, Take 1 tablet (20 mg total) by mouth once daily., Disp: 90 tablet, Rfl: 1    senna-docusate 8.6-50 mg (PERICOLACE) 8.6-50 mg per tablet, Take 1 tablet by mouth once daily., Disp: , Rfl:     tiZANidine (ZANAFLEX) 4 MG tablet, Take 1 tablet (4 mg total) by mouth every 12 (twelve) hours as needed (for muscle spasms)., Disp: 30 tablet, Rfl: 1    zinc gluconate 50 mg  tablet, Take 50 mg by mouth once daily., Disp: , Rfl:   [2]   Social History  Socioeconomic History    Marital status:    Tobacco Use    Smoking status: Never     Passive exposure: Never    Smokeless tobacco: Never   Substance and Sexual Activity    Alcohol use: Never    Drug use: Never    Sexual activity: Not Currently     Social Drivers of Health     Financial Resource Strain: Low Risk  (8/6/2024)    Overall Financial Resource Strain (CARDIA)     Difficulty of Paying Living Expenses: Not hard at all   Food Insecurity: No Food Insecurity (8/6/2024)    Hunger Vital Sign     Worried About Running Out of Food in the Last Year: Never true     Ran Out of Food in the Last Year: Never true   Transportation Needs: No Transportation Needs (8/6/2024)    PRAPARE - Transportation     Lack of Transportation (Medical): No     Lack of Transportation (Non-Medical): No   Physical Activity: Inactive (8/6/2024)    Exercise Vital Sign     Days of Exercise per Week: 0 days     Minutes of Exercise per Session: 0 min   Stress: Stress Concern Present (8/6/2024)    [employer] [place] [employer] - Occupational Stress Questionnaire     Feeling of Stress : Very much   Housing Stability: Unknown (8/6/2024)    Housing Stability Vital Sign     Unable to Pay for Housing in the Last Year: No     Homeless in the Last Year: No

## 2025-05-22 ENCOUNTER — OFFICE VISIT (OUTPATIENT)
Dept: ORTHOPEDICS | Facility: CLINIC | Age: 80
End: 2025-05-22
Payer: MEDICARE

## 2025-05-22 DIAGNOSIS — M65.342 TRIGGER RING FINGER OF LEFT HAND: Primary | ICD-10-CM

## 2025-05-22 DIAGNOSIS — M65.341 TRIGGER RING FINGER OF RIGHT HAND: ICD-10-CM

## 2025-05-22 PROCEDURE — 99214 OFFICE O/P EST MOD 30 MIN: CPT | Mod: PBBFAC

## 2025-05-22 PROCEDURE — 99999PBSHW PR PBB SHADOW TECHNICAL ONLY FILED TO HB: Mod: PBBFAC,,,

## 2025-05-22 PROCEDURE — 99999 PR PBB SHADOW E&M-EST. PATIENT-LVL IV: CPT | Mod: PBBFAC,,,

## 2025-05-22 PROCEDURE — 20550 NJX 1 TENDON SHEATH/LIGAMENT: CPT | Mod: PBBFAC,RT

## 2025-05-22 RX ADMIN — TRIAMCINOLONE ACETONIDE 0.5 ML: 40 INJECTION, SUSPENSION INTRA-ARTICULAR; INTRAMUSCULAR at 10:05

## 2025-05-22 RX ADMIN — BUPIVACAINE HYDROCHLORIDE 0.5 ML: 2.5 INJECTION, SOLUTION EPIDURAL; INFILTRATION; INTRACAUDAL; PERINEURAL at 10:05

## 2025-05-22 NOTE — PROCEDURES
Tendon Sheath    Date/Time: 5/22/2025 10:40 AM    Performed by: Frances Morrison PA  Authorized by: Frances Morrison PA    Consent Done?:  Yes (Verbal)  Indications:  Pain  Location:  Long finger  Site:  L long flexor tendon sheath  Needle size:  25 G  Approach:  Volar  Medications:  0.5 mL BUPivacaine (PF) 0.25% (2.5 mg/ml) 0.25 % (2.5 mg/mL); 0.5 mL triamcinolone acetonide 40 mg/mL  Patient tolerance:  Patient tolerated the procedure well with no immediate complications  Tendon Sheath    Date/Time: 5/22/2025 10:40 AM    Performed by: Frances Morrison PA  Authorized by: Frances Morrison PA    Consent Done?:  Yes (Verbal)  Indications:  Pain  Location:  Long finger  Site:  R long flexor tendon sheath  Needle size:  25 G  Approach:  Volar  Medications:  0.5 mL BUPivacaine (PF) 0.25% (2.5 mg/ml) 0.25 % (2.5 mg/mL); 0.5 mL triamcinolone acetonide 40 mg/mL  Patient tolerance:  Patient tolerated the procedure well with no immediate complications

## 2025-05-28 RX ORDER — BUPIVACAINE HYDROCHLORIDE 2.5 MG/ML
0.5 INJECTION, SOLUTION EPIDURAL; INFILTRATION; INTRACAUDAL; PERINEURAL
Status: DISCONTINUED | OUTPATIENT
Start: 2025-05-22 | End: 2025-05-28 | Stop reason: HOSPADM

## 2025-05-28 RX ORDER — TRIAMCINOLONE ACETONIDE 40 MG/ML
0.5 INJECTION, SUSPENSION INTRA-ARTICULAR; INTRAMUSCULAR
Status: DISCONTINUED | OUTPATIENT
Start: 2025-05-22 | End: 2025-05-28 | Stop reason: HOSPADM

## 2025-05-28 NOTE — PROGRESS NOTES
CC:   Chief Complaint   Patient presents with    Right Hand - Pain     Bilateral steroid injection today     Left Hand - Pain        Felicia Keita is a 80 y.o. female seen today for follow up of Pain of the Right Hand (Bilateral steroid injection today ) and Pain of the Left Hand  Patient was here few weeks ago for left hand trigger finger of the thumb and ring fingers.  Today she presents for injections of trigger finger in her right hand.  No new complaints today.      PAST MEDICAL HISTORY:   Past Medical History:   Diagnosis Date    Abnormal gait 05/22/2013    Actinic keratosis 08/12/2020    L forearm     Acute cough 03/30/2023    Allergic rhinitis 04/07/2020    Bilateral chronic serous otitis media 05/22/2024    Blepharophimosis 04/22/2014    senile    Cervical somatic dysfunction 08/15/2017    Cervicalgia 03/28/2019    Chronic peripheral venous hypertension 06/16/2015    Chronic serous otitis media, bilateral 09/05/2019    Chronic venous hypertension (idiopathic) without complications of unspecified lower extremity 08/15/2017    Conjunctival hemorrhage, right eye 11/02/2020    Deep venous thrombosis of lower extremity 09/17/2012    Degeneration of cervical intervertebral disc 10/21/2011    Degeneration of lumbar intervertebral disc 10/21/2011    Degenerative joint disease involving multiple joints 07/25/2011    Depressive disorder 07/25/2011    Essential hypertension 08/12/2020    Essential tremor 06/24/2014    GERD without esophagitis 08/15/2017    Headache 04/07/2020    Hyperlipidemia 05/08/2012    Insomnia 04/06/2016    Osteoarthritis 01/04/2017    Osteoporosis 11/07/2017    Otalgia, right ear 10/17/2016    Other cervical disc degeneration, unspecified cervical region 08/15/2017    Overflow incontinence 01/06/2020    Pain in right knee 01/06/2020    osteoarthritis    Pain in right leg 09/10/2020    Peripheral arterial occlusive disease 02/24/2015    Peripheral vascular disease, unspecified  08/06/2019    Peripheral venous insufficiency 09/29/2015    Personal history of PE (pulmonary embolism) 08/06/2019    Presence of vena cava filter 03/2015    Pulmonary embolus 03/10/2015    Pulmonary infarction 03/16/2012    Pure hypercholesterolemia 07/25/2011    Restless legs 05/22/2018    Right temporomandibular joint disorder, unspecified 08/18/2020    Rosacea 07/01/2015    Sciatica, right side 07/06/2016    Seborrheic keratosis 01/07/2021    Segmental and somatic dysfunction of thoracic region 03/28/2019    Senile osteoporosis 04/24/2017    Spasm of back muscles 07/25/2011    Stroke 09/25/2023    Trochanteric bursitis of right hip 01/06/2020    Unspecified urinary incontinence 02/07/2020    Vitamin D deficiency 10/27/2014        PAST SURGICAL HISTORY:   Past Surgical History:   Procedure Laterality Date    APPENDECTOMY      bone density  06/27/2019    Ajith/Abiel    BREAST SURGERY Left 2013    approximate    CARPAL TUNNEL RELEASE      BLI    LUMBAR LAMINECTOMY      prolia  03/12/2019    1mg    remove cataract Right     insert lens    TONSILLECTOMY      TOTAL ABDOMINAL HYSTERECTOMY      VAGINAL DELIVERY      vascular surgery procedure       Right SSV Laser Ablation performed by Dr. Carlo hernandez screen  05/24/2018        ALLERGIES: Review of patient's allergies indicates:  No Known Allergies     MEDICATIONS :  Current Medications[1]     SOCIAL HISTORY: Social History[2]     FAMILY HISTORY:   Family History   Problem Relation Name Age of Onset    Hearing loss Father      Hypertension Father      Emphysema Father      Skin cancer Father      Stomach cancer Sister      Hypertension Sister      COPD Sister      Rheum arthritis Sister      Cancer Brother      Melanoma Brother      COPD Daughter      Rheum arthritis Daughter            PHYSICAL EXAM:      There were no vitals filed for this visit.  There is no height or weight on file to calculate BMI.    GENERAL: Well-developed, well-nourished female . The  patient is alert, oriented and cooperative.    EXTREMITIES:  Triggering of bilateral long fingers      RADIOGRAPHIC FINDINGS:   No results found.     Patient Active Problem List    Diagnosis Date Noted    Edema 02/12/2025    Bilateral chronic serous otitis media 02/12/2025    Essential hypertension 02/12/2025    Acute laryngitis 10/17/2024    Cough 10/17/2024    Sorethroat 10/17/2024    Urinary tract infection without hematuria 08/07/2024    Dysuria 08/07/2024    Urinary incontinence without sensory awareness 08/06/2024    Right ear pain 08/06/2024    BMI 30.0-30.9,adult 08/06/2024    Abnormality of gait and mobility 08/06/2024    Encounter for subsequent annual wellness visit (AWV) in Medicare patient 08/06/2024    Skin lesion of back 07/31/2024    Neuropathy 07/31/2024    Hemiplegia and hemiparesis following cerebral infarction affecting right dominant side 05/22/2024    Chronic deep vein thrombosis (DVT) of other vein of left lower extremity 05/22/2024    Bilateral hand pain 05/22/2024    Hypertension 05/22/2024    Urine WBC increased 01/26/2024    Dizziness 09/27/2023    Essential tremor 09/25/2023    Recurrent deep vein thrombosis (DVT) 09/25/2023    Mass of right parotid gland 09/25/2023    Chronic pain syndrome 09/25/2023    Chronic obstructive pulmonary disease 07/24/2022    Back pain 07/14/2021    Osteoporosis 07/14/2021    Trochanteric bursitis of right hip 01/06/2020    Restless legs 05/22/2018    GERD without esophagitis 08/15/2017    Osteoarthritis 01/04/2017    Hyperlipidemia 05/08/2012    Depression 07/25/2011       IMPRESSION AND PLAN:  Trigger finger multiple fingers bilateral hands.  Injections given last time in her left ring and thumb, left hand.  Patient presents today for additional trigger finger injections of both long fingers.  Discussed that if she has minimal relief from these trigger finger injections recommend she follow up with 1 of the orthopedic surgeons to discuss possible trigger  finger release.      No follow-ups on file.       Frances Morrison PA-C      (Subject to voice recognition error, transcription service not allowed)         [1]   Current Outpatient Medications:     albuterol (PROVENTIL/VENTOLIN HFA) 90 mcg/actuation inhaler, Inhale 1-2 puffs into the lungs every 4 (four) hours as needed for Wheezing. Rescue, Disp: 18 g, Rfl: 3    albuterol-ipratropium (DUO-NEB) 2.5 mg-0.5 mg/3 mL nebulizer solution, Take 3 mLs by nebulization every 6 (six) hours as needed for Wheezing. Rescue, Disp: 100 mL, Rfl: 4    amLODIPine (NORVASC) 5 MG tablet, Take 1 tablet (5 mg total) by mouth 2 (two) times daily., Disp: 180 tablet, Rfl: 1    ascorbic acid, vitamin C, (VITAMIN C) 500 MG tablet, Take 500 mg by mouth once daily., Disp: , Rfl:     aspirin (ECOTRIN) 81 MG EC tablet, Take 1 tablet (81 mg total) by mouth once daily., Disp: 30 tablet, Rfl: 0    atorvastatin (LIPITOR) 80 MG tablet, Take 1 tablet (80 mg total) by mouth once daily., Disp: 90 tablet, Rfl: 1    calcium carbonate-vitamin D3 1,000 mg-20 mcg (800 unit) Tab, Take 1 each by mouth Daily., Disp: 90 tablet, Rfl: 1    celecoxib (CELEBREX) 200 MG capsule, Take 1 capsule (200 mg total) by mouth once daily., Disp: 90 capsule, Rfl: 1    cetirizine (ZYRTEC) 10 MG tablet, Take 1 tablet (10 mg total) by mouth once daily., Disp: 30 tablet, Rfl: 0    denosumab (PROLIA) 60 mg/mL Syrg, as directed Subcutaneous, Disp: , Rfl:     desvenlafaxine succinate (PRISTIQ) 100 MG Tb24, Take 1 tablet (100 mg total) by mouth once daily., Disp: 90 tablet, Rfl: 1    diaper,brief,adult,disposable Misc, 1 each by Misc.(Non-Drug; Combo Route) route continuous prn (prn)., Disp: 120 each, Rfl: 5    diclofenac sodium (VOLTAREN) 1 % Gel, Apply 2 g topically once daily., Disp: 450 g, Rfl: 3    furosemide (LASIX) 20 MG tablet, Take 1 tablet (20 mg total) by mouth once daily., Disp: 90 tablet, Rfl: 1    gabapentin (NEURONTIN) 300 MG capsule, TAKE 1 CAPSULE BY MOUTH IN THE  MORNING, TAKE 1 CAPSULE at LUNCH, AND TAKE 2 CAPSULES AT BEDTIME, Disp: 380 capsule, Rfl: 1    HYDROcodone-acetaminophen (NORCO)  mg per tablet, 05/27/21 TAKE 1 TABLET BY MOUTH EVERY 8 HOURS AS NEEDED, Disp: , Rfl:     ipratropium (ATROVENT) 42 mcg (0.06 %) nasal spray, 2 sprays by Each Nostril route 4 (four) times daily., Disp: 15 mL, Rfl: 4    losartan (COZAAR) 100 MG tablet, Take 1 tablet (100 mg total) by mouth once daily., Disp: 90 tablet, Rfl: 1    omeprazole (PRILOSEC) 20 MG capsule, Take 1 capsule (20 mg total) by mouth once daily., Disp: 90 capsule, Rfl: 1    pramipexole (MIRAPEX) 0.5 MG tablet, Take 1 tablet (0.5 mg total) by mouth 3 (three) times daily., Disp: 90 tablet, Rfl: 3    primidone (MYSOLINE) 50 MG Tab, TAKE 2 TABLETS BY MOUTH EVERY MORNING AND TAKE 3 TABLETS BY MOUTH EVERY EVENING, Disp: 450 tablet, Rfl: 1    propranoloL (INDERAL) 40 MG tablet, Take 1 tablet (40 mg total) by mouth 2 (two) times daily., Disp: 180 tablet, Rfl: 1    rivaroxaban (XARELTO) 20 mg Tab, Take 1 tablet (20 mg total) by mouth once daily., Disp: 90 tablet, Rfl: 1    senna-docusate 8.6-50 mg (PERICOLACE) 8.6-50 mg per tablet, Take 1 tablet by mouth once daily., Disp: , Rfl:     tiZANidine (ZANAFLEX) 4 MG tablet, Take 1 tablet (4 mg total) by mouth every 12 (twelve) hours as needed (for muscle spasms)., Disp: 30 tablet, Rfl: 1    zinc gluconate 50 mg tablet, Take 50 mg by mouth once daily., Disp: , Rfl:   [2]   Social History  Socioeconomic History    Marital status:    Tobacco Use    Smoking status: Never     Passive exposure: Never    Smokeless tobacco: Never   Substance and Sexual Activity    Alcohol use: Never    Drug use: Never    Sexual activity: Not Currently     Social Drivers of Health     Financial Resource Strain: Low Risk  (8/6/2024)    Overall Financial Resource Strain (CARDIA)     Difficulty of Paying Living Expenses: Not hard at all   Food Insecurity: No Food Insecurity (8/6/2024)    Hunger Vital  Sign     Worried About Running Out of Food in the Last Year: Never true     Ran Out of Food in the Last Year: Never true   Transportation Needs: No Transportation Needs (8/6/2024)    PRAPARE - Transportation     Lack of Transportation (Medical): No     Lack of Transportation (Non-Medical): No   Physical Activity: Inactive (8/6/2024)    Exercise Vital Sign     Days of Exercise per Week: 0 days     Minutes of Exercise per Session: 0 min   Stress: Stress Concern Present (8/6/2024)    [employer] [place] [employer] - Occupational Stress Questionnaire     Feeling of Stress : Very much   Housing Stability: Unknown (8/6/2024)    Housing Stability Vital Sign     Unable to Pay for Housing in the Last Year: No     Homeless in the Last Year: No

## 2025-05-30 DIAGNOSIS — J44.9 CHRONIC OBSTRUCTIVE PULMONARY DISEASE, UNSPECIFIED COPD TYPE: ICD-10-CM

## 2025-05-30 DIAGNOSIS — I10 HYPERTENSION, UNSPECIFIED TYPE: ICD-10-CM

## 2025-06-18 DIAGNOSIS — M16.11 PRIMARY OSTEOARTHRITIS OF RIGHT HIP: ICD-10-CM

## 2025-06-18 DIAGNOSIS — H65.23 BILATERAL CHRONIC SEROUS OTITIS MEDIA: ICD-10-CM

## 2025-06-18 RX ORDER — IPRATROPIUM BROMIDE 42 UG/1
2 SPRAY, METERED NASAL 4 TIMES DAILY
Qty: 15 ML | Refills: 4 | Status: SHIPPED | OUTPATIENT
Start: 2025-06-18

## 2025-06-30 ENCOUNTER — APPOINTMENT (OUTPATIENT)
Dept: LAB | Facility: HOSPITAL | Age: 80
End: 2025-06-30
Attending: NURSE PRACTITIONER
Payer: MEDICARE

## 2025-06-30 ENCOUNTER — INFUSION (OUTPATIENT)
Dept: INFUSION THERAPY | Facility: HOSPITAL | Age: 80
End: 2025-06-30
Attending: NURSE PRACTITIONER
Payer: MEDICARE

## 2025-06-30 DIAGNOSIS — M81.0 AGE-RELATED OSTEOPOROSIS WITHOUT CURRENT PATHOLOGICAL FRACTURE: Primary | ICD-10-CM

## 2025-06-30 DIAGNOSIS — M81.0 AGE-RELATED OSTEOPOROSIS WITHOUT CURRENT PATHOLOGICAL FRACTURE: ICD-10-CM

## 2025-06-30 DIAGNOSIS — M81.0 OSTEOPOROSIS, UNSPECIFIED OSTEOPOROSIS TYPE, UNSPECIFIED PATHOLOGICAL FRACTURE PRESENCE: Primary | ICD-10-CM

## 2025-06-30 LAB — CALCIUM SERPL-MCNC: 9.2 MG/DL (ref 8.4–10.2)

## 2025-06-30 PROCEDURE — 96372 THER/PROPH/DIAG INJ SC/IM: CPT

## 2025-06-30 PROCEDURE — 63600175 PHARM REV CODE 636 W HCPCS: Mod: JZ,TB | Performed by: NURSE PRACTITIONER

## 2025-06-30 PROCEDURE — 36415 COLL VENOUS BLD VENIPUNCTURE: CPT | Performed by: NURSE PRACTITIONER

## 2025-06-30 PROCEDURE — 82310 ASSAY OF CALCIUM: CPT | Performed by: NURSE PRACTITIONER

## 2025-06-30 RX ADMIN — DENOSUMAB 60 MG: 60 INJECTION SUBCUTANEOUS at 10:06

## 2025-06-30 NOTE — PROGRESS NOTES
1000 Pt in room 2.  Calcium level checked today was 9.2.  Pt stated that she is taking her Calcium with Vitamin D.     1020 Administered Prolia SQ to left upper arm per protocol.  Pt tolerated well.    1040 No adverse reaction noted.  D/C home, ambulatory with appointment in hand to return in 6 months for next Prolia injection.

## 2025-07-01 ENCOUNTER — TELEPHONE (OUTPATIENT)
Dept: FAMILY MEDICINE | Facility: CLINIC | Age: 80
End: 2025-07-01
Payer: MEDICARE

## 2025-07-01 DIAGNOSIS — J44.9 CHRONIC OBSTRUCTIVE PULMONARY DISEASE, UNSPECIFIED COPD TYPE: ICD-10-CM

## 2025-07-01 RX ORDER — ALBUTEROL SULFATE 90 UG/1
1-2 INHALANT RESPIRATORY (INHALATION) EVERY 4 HOURS PRN
Qty: 18 G | Refills: 3 | Status: SHIPPED | OUTPATIENT
Start: 2025-07-01

## 2025-07-01 RX ORDER — IPRATROPIUM BROMIDE AND ALBUTEROL SULFATE 2.5; .5 MG/3ML; MG/3ML
3 SOLUTION RESPIRATORY (INHALATION) EVERY 6 HOURS PRN
Qty: 100 ML | Refills: 4 | Status: SHIPPED | OUTPATIENT
Start: 2025-07-01

## 2025-07-01 NOTE — TELEPHONE ENCOUNTER
Copied from CRM #[id number]. Topic: Medications - Medication Refill  >> Jul 1, 2025  2:37 PM Helen wrote:  Who Called: Felicia Keita    Refill or New Rx:Refill  RX Name and Strength: ipratropium (ATROVENT) 42 mcg (0.06 %) nasal spray 15 mL 4 6/18/2025 - No  Sig - Route: 2 sprays by Each Nostril route 4 (four) times daily. - Each Nostril    RX Name and Strength: atorvastatin (LIPITOR) 80 MG tablet 90 tablet 1 2/12/2025 - No  Sig - Route: Take 1 tablet (80 mg total) by mouth once daily. - Oral        Local or Mail Order: local  List of preferred pharmacies on file: [facility] [address] [address]  Phone: [phone] Fax: [phone]  Hours: Not open 24 hours      Ordering Provider: Demi Harris FNP      Preferred Method of Contact: Phone Call  Patient's Preferred Phone Number on File: [phone]   Best Call Back Number, if different:  Additional Information: I informed pt. That the Rx ipratropium (ATROVENT)  should be at the pharmacy. I asked her to check but told her I would still send it to the office just in case.    Spoke with pharmacy and they have refills on both prescriptions  she got the Atorvastatin filled on 4/11/25 and still has another 90 day supply on file.

## 2025-07-01 NOTE — TELEPHONE ENCOUNTER
Copied from CRM #[id number]. Topic: Medications - Medication Refill  >> Jul 1, 2025  3:33 PM Marnie wrote:  Who Called: Felicia Keita    Refill or New Rx:Refill  RX Name and Strength: albuterol (PROVENTIL/VENTOLIN HFA) 90 mcg/actuation inhaler 18 g 3 2/12/2025 - No  Sig - Route: Inhale 1-2 puffs into the lungs every 4 (four) hours as needed for Wheezing. Rescue - Inhalation  Sent to pharmacy as: albuterol (PROVENTIL/VENTOLIN HFA) 90 mcg/actuation inhaler    albuterol-ipratropium (DUO-NEB) 2.5 mg-0.5 mg/3 mL nebulizer solution 100 mL 4 2/12/2025 - No  Sig - Route: Take 3 mLs by nebulization every 6 (six) hours as needed for Wheezing. Rescue - Nebulization  Sent to pharmacy as: albuterol-ipratropium (DUO-NEB) 2.5 mg-0.5 mg/3 mL nebulizer solution      Local or Mail Order: Local     List of preferred pharmacies on file (remove unneeded): [unfilled]  If different Pharmacy is requested, enter Pharmacy information here including location and phone number: [facility] [address] [address]  Phone: [phone] Fax: [phone]      Ordering Provider: Demi Harris      Preferred Method of Contact: Phone Call  Patient's Preferred Phone Number on File: [phone]   Best Call Back Number, if different:  Additional Information:      Called [facility],spoke with Bernadette pt did not have any refills on either of these medicines. Sent to PCP for refills.

## 2025-07-28 ENCOUNTER — HOSPITAL ENCOUNTER (OUTPATIENT)
Dept: RADIOLOGY | Facility: HOSPITAL | Age: 80
Discharge: HOME | End: 2025-07-28
Attending: NURSE PRACTITIONER
Payer: MEDICARE

## 2025-07-28 DIAGNOSIS — M81.0 AGE-RELATED OSTEOPOROSIS WITHOUT CURRENT PATHOLOGICAL FRACTURE: ICD-10-CM

## 2025-07-28 PROCEDURE — 77080 DXA BONE DENSITY AXIAL: CPT | Mod: 26,,, | Performed by: NUCLEAR MEDICINE

## 2025-07-28 PROCEDURE — 77080 DXA BONE DENSITY AXIAL: CPT | Mod: TC

## 2025-07-31 ENCOUNTER — TELEPHONE (OUTPATIENT)
Dept: FAMILY MEDICINE | Facility: CLINIC | Age: 80
End: 2025-07-31
Payer: MEDICARE

## 2025-07-31 NOTE — TELEPHONE ENCOUNTER
PCP had left a message for pt Felicia Bossman. Ms Keita was having increased edema to feet. New order for pt to increase Lasix to BID for 3 days, then resume daily. I called pt and notified her of this. She said the swelling seemed to be a little better. I told her if this didn't help to let us know. Pt DAYSI

## 2025-08-11 ENCOUNTER — OFFICE VISIT (OUTPATIENT)
Dept: FAMILY MEDICINE | Facility: CLINIC | Age: 80
End: 2025-08-11
Payer: MEDICARE

## 2025-08-11 ENCOUNTER — CLINICAL SUPPORT (OUTPATIENT)
Dept: FAMILY MEDICINE | Facility: CLINIC | Age: 80
End: 2025-08-11
Payer: MEDICARE

## 2025-08-11 VITALS
WEIGHT: 208.06 LBS | TEMPERATURE: 98 F | BODY MASS INDEX: 32.65 KG/M2 | SYSTOLIC BLOOD PRESSURE: 138 MMHG | OXYGEN SATURATION: 99 % | DIASTOLIC BLOOD PRESSURE: 80 MMHG | RESPIRATION RATE: 20 BRPM | HEIGHT: 67 IN | HEART RATE: 63 BPM

## 2025-08-11 DIAGNOSIS — Z00.00 ENCOUNTER FOR SUBSEQUENT ANNUAL WELLNESS VISIT (AWV) IN MEDICARE PATIENT: Primary | ICD-10-CM

## 2025-08-11 DIAGNOSIS — I10 ESSENTIAL HYPERTENSION: ICD-10-CM

## 2025-08-11 DIAGNOSIS — F33.0 MILD EPISODE OF RECURRENT MAJOR DEPRESSIVE DISORDER: ICD-10-CM

## 2025-08-11 DIAGNOSIS — G25.0 ESSENTIAL TREMOR: ICD-10-CM

## 2025-08-11 DIAGNOSIS — I69.351 HEMIPLEGIA AND HEMIPARESIS FOLLOWING CEREBRAL INFARCTION AFFECTING RIGHT DOMINANT SIDE: ICD-10-CM

## 2025-08-11 DIAGNOSIS — E78.2 MIXED HYPERLIPIDEMIA: ICD-10-CM

## 2025-08-11 DIAGNOSIS — K21.9 GERD WITHOUT ESOPHAGITIS: ICD-10-CM

## 2025-08-11 DIAGNOSIS — M81.0 AGE-RELATED OSTEOPOROSIS WITHOUT CURRENT PATHOLOGICAL FRACTURE: ICD-10-CM

## 2025-08-11 DIAGNOSIS — G89.4 CHRONIC PAIN SYNDROME: ICD-10-CM

## 2025-08-11 DIAGNOSIS — M15.0 PRIMARY OSTEOARTHRITIS INVOLVING MULTIPLE JOINTS: Primary | ICD-10-CM

## 2025-08-11 DIAGNOSIS — H65.93 FLUID LEVEL BEHIND TYMPANIC MEMBRANE OF BOTH EARS: ICD-10-CM

## 2025-08-11 DIAGNOSIS — M16.11 PRIMARY OSTEOARTHRITIS OF RIGHT HIP: ICD-10-CM

## 2025-08-11 DIAGNOSIS — E66.811 CLASS 1 OBESITY DUE TO EXCESS CALORIES WITH SERIOUS COMORBIDITY AND BODY MASS INDEX (BMI) OF 32.0 TO 32.9 IN ADULT: ICD-10-CM

## 2025-08-11 DIAGNOSIS — E66.09 CLASS 1 OBESITY DUE TO EXCESS CALORIES WITH SERIOUS COMORBIDITY AND BODY MASS INDEX (BMI) OF 32.0 TO 32.9 IN ADULT: ICD-10-CM

## 2025-08-11 DIAGNOSIS — R26.9 ABNORMALITY OF GAIT AND MOBILITY: ICD-10-CM

## 2025-08-11 DIAGNOSIS — J44.9 CHRONIC OBSTRUCTIVE PULMONARY DISEASE, UNSPECIFIED COPD TYPE: ICD-10-CM

## 2025-08-11 PROBLEM — R05.9 COUGH: Status: RESOLVED | Noted: 2024-10-17 | Resolved: 2025-08-11

## 2025-08-11 PROBLEM — J04.0 ACUTE LARYNGITIS: Status: RESOLVED | Noted: 2024-10-17 | Resolved: 2025-08-11

## 2025-08-11 PROBLEM — H92.01 RIGHT EAR PAIN: Status: RESOLVED | Noted: 2024-08-06 | Resolved: 2025-08-11

## 2025-08-11 PROBLEM — J02.9 SORETHROAT: Status: RESOLVED | Noted: 2024-10-17 | Resolved: 2025-08-11

## 2025-08-11 PROCEDURE — 1125F AMNT PAIN NOTED PAIN PRSNT: CPT | Mod: ,,, | Performed by: NURSE PRACTITIONER

## 2025-08-11 PROCEDURE — G0439 PPPS, SUBSEQ VISIT: HCPCS | Mod: ,,, | Performed by: NURSE PRACTITIONER

## 2025-08-11 PROCEDURE — 3288F FALL RISK ASSESSMENT DOCD: CPT | Mod: ,,, | Performed by: NURSE PRACTITIONER

## 2025-08-11 PROCEDURE — 1170F FXNL STATUS ASSESSED: CPT | Mod: ,,, | Performed by: NURSE PRACTITIONER

## 2025-08-11 PROCEDURE — 3075F SYST BP GE 130 - 139MM HG: CPT | Mod: ,,, | Performed by: NURSE PRACTITIONER

## 2025-08-11 PROCEDURE — 1159F MED LIST DOCD IN RCRD: CPT | Mod: ,,, | Performed by: NURSE PRACTITIONER

## 2025-08-11 PROCEDURE — 96372 THER/PROPH/DIAG INJ SC/IM: CPT | Mod: ,,, | Performed by: NURSE PRACTITIONER

## 2025-08-11 PROCEDURE — 1101F PT FALLS ASSESS-DOCD LE1/YR: CPT | Mod: ,,, | Performed by: NURSE PRACTITIONER

## 2025-08-11 PROCEDURE — 1158F ADVNC CARE PLAN TLK DOCD: CPT | Mod: ,,, | Performed by: NURSE PRACTITIONER

## 2025-08-11 PROCEDURE — 3079F DIAST BP 80-89 MM HG: CPT | Mod: ,,, | Performed by: NURSE PRACTITIONER

## 2025-08-11 RX ORDER — METHYLPREDNISOLONE ACETATE 40 MG/ML
40 INJECTION, SUSPENSION INTRA-ARTICULAR; INTRALESIONAL; INTRAMUSCULAR; SOFT TISSUE
Status: COMPLETED | OUTPATIENT
Start: 2025-08-11 | End: 2025-08-11

## 2025-08-11 RX ORDER — CETIRIZINE HYDROCHLORIDE 10 MG/1
10 TABLET ORAL DAILY
Qty: 30 TABLET | Refills: 5 | Status: SHIPPED | OUTPATIENT
Start: 2025-08-11 | End: 2026-08-11

## 2025-08-11 RX ORDER — DEXAMETHASONE SODIUM PHOSPHATE 4 MG/ML
4 INJECTION, SOLUTION INTRA-ARTICULAR; INTRALESIONAL; INTRAMUSCULAR; INTRAVENOUS; SOFT TISSUE
Status: COMPLETED | OUTPATIENT
Start: 2025-08-11 | End: 2025-08-11

## 2025-08-11 RX ORDER — OMEPRAZOLE 40 MG/1
40 CAPSULE, DELAYED RELEASE ORAL DAILY
Qty: 90 CAPSULE | Refills: 3 | Status: SHIPPED | OUTPATIENT
Start: 2025-08-11 | End: 2026-08-11

## 2025-08-11 RX ADMIN — DEXAMETHASONE SODIUM PHOSPHATE 4 MG: 4 INJECTION, SOLUTION INTRA-ARTICULAR; INTRALESIONAL; INTRAMUSCULAR; INTRAVENOUS; SOFT TISSUE at 08:08

## 2025-08-11 RX ADMIN — METHYLPREDNISOLONE ACETATE 40 MG: 40 INJECTION, SUSPENSION INTRA-ARTICULAR; INTRALESIONAL; INTRAMUSCULAR; SOFT TISSUE at 08:08

## 2025-08-26 ENCOUNTER — TELEPHONE (OUTPATIENT)
Dept: FAMILY MEDICINE | Facility: CLINIC | Age: 80
End: 2025-08-26
Payer: MEDICARE

## 2025-08-26 DIAGNOSIS — J01.00 ACUTE NON-RECURRENT MAXILLARY SINUSITIS: Primary | ICD-10-CM

## 2025-08-26 RX ORDER — AZITHROMYCIN 250 MG/1
TABLET, FILM COATED ORAL
Qty: 6 TABLET | Refills: 0 | Status: SHIPPED | OUTPATIENT
Start: 2025-08-26 | End: 2025-08-31

## 2025-09-02 DIAGNOSIS — H65.23 BILATERAL CHRONIC SEROUS OTITIS MEDIA: ICD-10-CM

## 2025-09-02 DIAGNOSIS — M16.11 PRIMARY OSTEOARTHRITIS OF RIGHT HIP: ICD-10-CM

## 2025-09-02 DIAGNOSIS — J44.9 CHRONIC OBSTRUCTIVE PULMONARY DISEASE, UNSPECIFIED COPD TYPE: ICD-10-CM

## 2025-09-02 RX ORDER — IPRATROPIUM BROMIDE 42 UG/1
2 SPRAY, METERED NASAL 4 TIMES DAILY
Qty: 15 ML | Refills: 4 | Status: SHIPPED | OUTPATIENT
Start: 2025-09-02

## 2025-09-02 RX ORDER — ALBUTEROL SULFATE 90 UG/1
1-2 INHALANT RESPIRATORY (INHALATION) EVERY 4 HOURS PRN
Qty: 18 G | Refills: 3 | Status: SHIPPED | OUTPATIENT
Start: 2025-09-02